# Patient Record
Sex: FEMALE | Race: WHITE | NOT HISPANIC OR LATINO | URBAN - METROPOLITAN AREA
[De-identification: names, ages, dates, MRNs, and addresses within clinical notes are randomized per-mention and may not be internally consistent; named-entity substitution may affect disease eponyms.]

---

## 2021-10-09 ENCOUNTER — INPATIENT (INPATIENT)
Facility: HOSPITAL | Age: 86
LOS: 11 days | Discharge: HOME | DRG: 871 | End: 2021-10-21
Attending: STUDENT IN AN ORGANIZED HEALTH CARE EDUCATION/TRAINING PROGRAM | Admitting: HOSPITALIST
Payer: MEDICARE

## 2021-10-09 VITALS
RESPIRATION RATE: 22 BRPM | HEART RATE: 140 BPM | TEMPERATURE: 97 F | WEIGHT: 169.98 LBS | HEIGHT: 64 IN | OXYGEN SATURATION: 80 % | SYSTOLIC BLOOD PRESSURE: 105 MMHG | DIASTOLIC BLOOD PRESSURE: 65 MMHG

## 2021-10-09 LAB
ALBUMIN SERPL ELPH-MCNC: 3.4 G/DL — SIGNIFICANT CHANGE UP (ref 3.3–5)
ALBUMIN SERPL ELPH-MCNC: 3.6 G/DL — SIGNIFICANT CHANGE UP (ref 3.3–5)
ALP SERPL-CCNC: 41 U/L — SIGNIFICANT CHANGE UP (ref 40–120)
ALP SERPL-CCNC: 42 U/L — SIGNIFICANT CHANGE UP (ref 40–120)
ALT FLD-CCNC: 12 U/L — SIGNIFICANT CHANGE UP (ref 10–45)
ALT FLD-CCNC: SIGNIFICANT CHANGE UP U/L (ref 10–45)
ANION GAP SERPL CALC-SCNC: 16 MMOL/L — SIGNIFICANT CHANGE UP (ref 5–17)
ANION GAP SERPL CALC-SCNC: 17 MMOL/L — SIGNIFICANT CHANGE UP (ref 5–17)
APTT BLD: 30.8 SEC — SIGNIFICANT CHANGE UP (ref 27.5–35.5)
AST SERPL-CCNC: 22 U/L — SIGNIFICANT CHANGE UP (ref 10–40)
AST SERPL-CCNC: SIGNIFICANT CHANGE UP U/L (ref 10–40)
BASOPHILS # BLD AUTO: 0.01 K/UL — SIGNIFICANT CHANGE UP (ref 0–0.2)
BASOPHILS NFR BLD AUTO: 0.1 % — SIGNIFICANT CHANGE UP (ref 0–2)
BILIRUB SERPL-MCNC: 0.2 MG/DL — SIGNIFICANT CHANGE UP (ref 0.2–1.2)
BILIRUB SERPL-MCNC: 0.2 MG/DL — SIGNIFICANT CHANGE UP (ref 0.2–1.2)
BUN SERPL-MCNC: 51 MG/DL — HIGH (ref 7–23)
BUN SERPL-MCNC: 52 MG/DL — HIGH (ref 7–23)
CALCIUM SERPL-MCNC: 8.2 MG/DL — LOW (ref 8.4–10.5)
CALCIUM SERPL-MCNC: 8.3 MG/DL — LOW (ref 8.4–10.5)
CHLORIDE SERPL-SCNC: 98 MMOL/L — SIGNIFICANT CHANGE UP (ref 96–108)
CHLORIDE SERPL-SCNC: 98 MMOL/L — SIGNIFICANT CHANGE UP (ref 96–108)
CO2 SERPL-SCNC: 18 MMOL/L — LOW (ref 22–31)
CO2 SERPL-SCNC: 20 MMOL/L — LOW (ref 22–31)
CREAT SERPL-MCNC: 4.58 MG/DL — HIGH (ref 0.5–1.3)
CREAT SERPL-MCNC: 4.63 MG/DL — HIGH (ref 0.5–1.3)
CRP SERPL-MCNC: 117.9 MG/L — HIGH (ref 0–4)
D DIMER BLD IA.RAPID-MCNC: 345 NG/ML DDU — HIGH
EOSINOPHIL # BLD AUTO: 0 K/UL — SIGNIFICANT CHANGE UP (ref 0–0.5)
EOSINOPHIL NFR BLD AUTO: 0 % — SIGNIFICANT CHANGE UP (ref 0–6)
FERRITIN SERPL-MCNC: 283 NG/ML — HIGH (ref 15–150)
GAS PNL BLDV: SIGNIFICANT CHANGE UP
GLUCOSE SERPL-MCNC: 194 MG/DL — HIGH (ref 70–99)
GLUCOSE SERPL-MCNC: 196 MG/DL — HIGH (ref 70–99)
HCT VFR BLD CALC: 42.3 % — SIGNIFICANT CHANGE UP (ref 34.5–45)
HGB BLD-MCNC: 14.1 G/DL — SIGNIFICANT CHANGE UP (ref 11.5–15.5)
IMM GRANULOCYTES NFR BLD AUTO: 0.6 % — SIGNIFICANT CHANGE UP (ref 0–1.5)
INR BLD: 1.03 — SIGNIFICANT CHANGE UP (ref 0.88–1.16)
LACTATE SERPL-SCNC: 1.8 MMOL/L — SIGNIFICANT CHANGE UP (ref 0.5–2)
LYMPHOCYTES # BLD AUTO: 1.26 K/UL — SIGNIFICANT CHANGE UP (ref 1–3.3)
LYMPHOCYTES # BLD AUTO: 11.7 % — LOW (ref 13–44)
MCHC RBC-ENTMCNC: 29.9 PG — SIGNIFICANT CHANGE UP (ref 27–34)
MCHC RBC-ENTMCNC: 33.3 GM/DL — SIGNIFICANT CHANGE UP (ref 32–36)
MCV RBC AUTO: 89.8 FL — SIGNIFICANT CHANGE UP (ref 80–100)
MONOCYTES # BLD AUTO: 0.89 K/UL — SIGNIFICANT CHANGE UP (ref 0–0.9)
MONOCYTES NFR BLD AUTO: 8.3 % — SIGNIFICANT CHANGE UP (ref 2–14)
NEUTROPHILS # BLD AUTO: 8.54 K/UL — HIGH (ref 1.8–7.4)
NEUTROPHILS NFR BLD AUTO: 79.3 % — HIGH (ref 43–77)
NRBC # BLD: 0 /100 WBCS — SIGNIFICANT CHANGE UP (ref 0–0)
NT-PROBNP SERPL-SCNC: 502 PG/ML — HIGH (ref 0–300)
PLATELET # BLD AUTO: 210 K/UL — SIGNIFICANT CHANGE UP (ref 150–400)
POTASSIUM SERPL-MCNC: 4.2 MMOL/L — SIGNIFICANT CHANGE UP (ref 3.5–5.3)
POTASSIUM SERPL-MCNC: SIGNIFICANT CHANGE UP MMOL/L (ref 3.5–5.3)
POTASSIUM SERPL-SCNC: 4.2 MMOL/L — SIGNIFICANT CHANGE UP (ref 3.5–5.3)
POTASSIUM SERPL-SCNC: SIGNIFICANT CHANGE UP MMOL/L (ref 3.5–5.3)
PROCALCITONIN SERPL-MCNC: 1.32 NG/ML — HIGH (ref 0.02–0.1)
PROT SERPL-MCNC: 7.2 G/DL — SIGNIFICANT CHANGE UP (ref 6–8.3)
PROT SERPL-MCNC: 7.8 G/DL — SIGNIFICANT CHANGE UP (ref 6–8.3)
PROTHROM AB SERPL-ACNC: 12.3 SEC — SIGNIFICANT CHANGE UP (ref 10.6–13.6)
RAPID RVP RESULT: DETECTED
RBC # BLD: 4.71 M/UL — SIGNIFICANT CHANGE UP (ref 3.8–5.2)
RBC # FLD: 13.7 % — SIGNIFICANT CHANGE UP (ref 10.3–14.5)
SARS-COV-2 RNA SPEC QL NAA+PROBE: DETECTED
SODIUM SERPL-SCNC: 133 MMOL/L — LOW (ref 135–145)
SODIUM SERPL-SCNC: 134 MMOL/L — LOW (ref 135–145)
TROPONIN T SERPL-MCNC: 0.02 NG/ML — HIGH (ref 0–0.01)
WBC # BLD: 10.77 K/UL — HIGH (ref 3.8–10.5)
WBC # FLD AUTO: 10.77 K/UL — HIGH (ref 3.8–10.5)

## 2021-10-09 PROCEDURE — 99291 CRITICAL CARE FIRST HOUR: CPT

## 2021-10-09 PROCEDURE — 99291 CRITICAL CARE FIRST HOUR: CPT | Mod: CS

## 2021-10-09 PROCEDURE — 93010 ELECTROCARDIOGRAM REPORT: CPT

## 2021-10-09 PROCEDURE — 71045 X-RAY EXAM CHEST 1 VIEW: CPT | Mod: 26

## 2021-10-09 RX ORDER — IPRATROPIUM/ALBUTEROL SULFATE 18-103MCG
3 AEROSOL WITH ADAPTER (GRAM) INHALATION ONCE
Refills: 0 | Status: COMPLETED | OUTPATIENT
Start: 2021-10-09 | End: 2021-10-09

## 2021-10-09 RX ORDER — SODIUM CHLORIDE 9 MG/ML
1000 INJECTION INTRAMUSCULAR; INTRAVENOUS; SUBCUTANEOUS ONCE
Refills: 0 | Status: COMPLETED | OUTPATIENT
Start: 2021-10-09 | End: 2021-10-09

## 2021-10-09 RX ORDER — ACETAMINOPHEN 500 MG
650 TABLET ORAL EVERY 6 HOURS
Refills: 0 | Status: DISCONTINUED | OUTPATIENT
Start: 2021-10-09 | End: 2021-10-21

## 2021-10-09 RX ORDER — DEXAMETHASONE 0.5 MG/5ML
6 ELIXIR ORAL ONCE
Refills: 0 | Status: COMPLETED | OUTPATIENT
Start: 2021-10-09 | End: 2021-10-09

## 2021-10-09 RX ORDER — REMDESIVIR 5 MG/ML
100 INJECTION INTRAVENOUS EVERY 24 HOURS
Refills: 0 | Status: DISCONTINUED | OUTPATIENT
Start: 2021-10-09 | End: 2021-10-09

## 2021-10-09 RX ORDER — ENOXAPARIN SODIUM 100 MG/ML
30 INJECTION SUBCUTANEOUS ONCE
Refills: 0 | Status: COMPLETED | OUTPATIENT
Start: 2021-10-09 | End: 2021-10-09

## 2021-10-09 RX ORDER — DEXAMETHASONE 0.5 MG/5ML
6 ELIXIR ORAL EVERY 12 HOURS
Refills: 0 | Status: DISCONTINUED | OUTPATIENT
Start: 2021-10-10 | End: 2021-10-11

## 2021-10-09 RX ADMIN — Medication 6 MILLIGRAM(S): at 17:39

## 2021-10-09 RX ADMIN — SODIUM CHLORIDE 1000 MILLILITER(S): 9 INJECTION INTRAMUSCULAR; INTRAVENOUS; SUBCUTANEOUS at 17:55

## 2021-10-09 RX ADMIN — Medication 3 MILLILITER(S): at 17:03

## 2021-10-09 RX ADMIN — Medication 3 MILLILITER(S): at 17:32

## 2021-10-09 RX ADMIN — ENOXAPARIN SODIUM 30 MILLIGRAM(S): 100 INJECTION SUBCUTANEOUS at 23:26

## 2021-10-09 NOTE — ED PROVIDER NOTE - OBJECTIVE STATEMENT
history of dm, htn, here with + covid test outpatient and generalized weakness/fatigue/sob/cough. Reports symptoms started after seeing son about a week ago who also tested positive. Denies pain, fever, chills. Saw her pmd Dr. Knight [phone] who reportedly told her to take mucinex and prescribed erythromycin for symptoms 3 days ago. Notified today that covid test came back positive, told to come to ED. Has been vaccinated with [employer] in February. Non smoker. history of dm, htn, here with + covid test outpatient and generalized weakness/fatigue/sob/cough. Reports symptoms started after seeing son about a week ago who also tested positive. Denies pain, fever, chills. Saw her pmd Dr. Knight [phone] who reportedly told her to take mucinex and prescribed erythromycin for symptoms 3 days ago. Notified today that covid test came back positive, told to come to ED. Has been vaccinated with [employer] in February. Non smoker.  Additional history above provided by daughter Ela Belle [phone]

## 2021-10-09 NOTE — ED PROVIDER NOTE - CARDIAC, MLM
Detail Level: Detailed Detail Level: Zone Normal rate, regular rhythm.  Heart sounds S1, S2.  No murmurs, rubs or gallops.

## 2021-10-09 NOTE — ED PROVIDER NOTE - CLINICAL SUMMARY MEDICAL DECISION MAKING FREE TEXT BOX
patient with reported + covid test outpatient, vaccinated, sick x 1 week with sob/ fatigue.  noted to be hypoxic on room air to 80%.  improved with supplemental oxygen to 91% on 6L.  confirmatory covid testing sent along with labs to eval other viral infections, acs, chf, pneumonia, inflammatory markers, sepsis.  cxr done showing bilateral patchy infiltrate.  started decadron.  tylenol prn for fever.  labs notable for elevated bun/ creatinine, unclear baseline. ivf bolus given as suspect component of dehydration. anticipate remdisivir by inpatient team. due to oxygen requirement, will admit for further management of acute hypoxic respiratory failure.

## 2021-10-09 NOTE — H&P ADULT - NSHPLABSRESULTS_GEN_ALL_CORE
14.1   10.77 )-----------( 210      ( 09 Oct 2021 16:52 )             42.3       10-09    134<L>  |  98  |  52<H>  ----------------------------<  194<H>  4.2   |  20<L>  |  4.63<H>    Ca    8.2<L>      09 Oct 2021 18:05    TPro  7.2  /  Alb  3.6  /  TBili  0.2  /  DBili  x   /  AST  22  /  ALT  12  /  AlkPhos  41  10-09                  PT/INR - ( 09 Oct 2021 18:05 )   PT: 12.3 sec;   INR: 1.03          PTT - ( 09 Oct 2021 18:05 )  PTT:30.8 sec    CARDIAC MARKERS ( 09 Oct 2021 16:52 )  x     / 0.02 ng/mL / x     / x     / x            CAPILLARY BLOOD GLUCOSE

## 2021-10-09 NOTE — H&P ADULT - ASSESSMENT
84 yo F with PMHx T2DM, HLD, HTN presented to [facility] ED on 10/9 with 1w Hx of generalized weakness, fatigue, SOB, and cough in the setting of positive COVID PCR as outpatient. Patient admitted to [facility] for tele monitoring i/s/o sepsis 2/2 COVID PNA.      NEURO  #    CARDIOVASCULAR  #HTN      #HLD      PULMONARY  #Sepsis 2/2 COVID PNA    ENDO  #T2DM    GI  #    ID  #    HEME/ONC  #      PROPHYLACTIC MEASURES  F:  E: Replete PRN  D:  DVT PPx:  GI PPx:  CODE:   Dispo: [facility]       86 yo F with PMHx T2DM, HLD, HTN presented to [facility] ED on 10/9 with 1w Hx of generalized weakness, fatigue, SOB, and cough in the setting of positive COVID PCR as outpatient. Patient admitted to [facility] for tele monitoring for AHRF i/s/o sepsis 2/2 COVID PNA.      NEURO  No active issues.     CARDIOVASCULAR  #HTN  Pt home meds include amlodipine 10 mg qd, metoprolol ER succinate 100mg qd and nifedipine 60mg ED qd.     #HLD  Pt home meds include simvastatin 20 mg qhs.    PULMONARY  #Acute hypoxemic respiratory failure i/s/o Sepsis 2/2 COVID PNA  - Place on HFNC to maintain spo2>90  - Remdesevir 200 mg On day 1 followed by 100 mg daily fro 4 days  - decadron 6 mg qd for 10 days      RENAL  #Acute kidney injury  BUN/Cr  51/4.58    ENDO  #T2DM  Pt home meds include glimepiride 2mg BID and metformin 1g qd.    GI  No active issues    ID  #Sepsis 2/2 COVID PNA    HEME/ONC  No active issues      PROPHYLACTIC MEASURES  F:  E: Replete PRN  D:  DVT PPx:  GI PPx:  CODE:   Dispo: [facility]       84 yo F with PMHx T2DM, HLD, HTN presented to [facility] ED on 10/9 with 1w Hx of generalized weakness, fatigue, SOB, and cough in the setting of positive COVID PCR as outpatient. Patient admitted to [facility] for tele monitoring for AHRF i/s/o sepsis 2/2 COVID PNA.      NEURO  No active issues.     CARDIOVASCULAR  #HTN  Pt home meds include amlodipine 10 mg qd, metoprolol ER succinate 100mg qd and nifedipine 60mg ED qd.   - Holding home meds for now    #HLD  Pt home meds include simvastatin 20 mg qhs.  - Holding home meds for now    #Troponemia  trop 0.02 on arrival, no past cardiac medical history except for HTN/HLD. EKG was NSR, 77 bpm, TWI in III.  -f/u trops AM 10/10  -Get collateral if pt has any cardiac medical history    PULMONARY  #Acute hypoxemic respiratory failure i/s/o Sepsis 2/2 COVID PNA  At ED, WBC and Hgb wnl, d-dimer 345, .9, ferritin 283, procal 1.32, lactate 1.8, trop 0.02, .   VBG: pH 7.27, pO2 41, pCO2 48, bicarb 22  CXR - No pneumothorax, bilateral patchy opacities, No free air. heart normal. bones normal.  Pt given duonebs x2, Dexamethasone 6mg IVP  - Place on HFNC to maintain spo2>90  - Remdesevir 200 mg On day 1 followed by 100 mg daily fro 4 days  - decadron 6 mg qd for 10 days      RENAL  #Acute kidney injury, possibly in the setting of decrease PO intake/dehydration vs COVID-associated JUDITH  BUN/Cr  51/4.58  - Send urine lytes and obtain renal sono  - Avoid nephrotoxic meds  - Trend BUN/Cr  - Get collateral from PMD regarding baseline    #Hyponatremia i/s/o decreased PO intake/dehydration  Na 133 on arrival  - c/w IV hydration with LR @ 75cc/hour  - f/u Na on 10/10 AM    ENDO  #T2DM  Pt home meds include glimepiride 2mg BID and metformin 1g qd.  - ISS  - Holding home meds for now    GI  No active issues    ID  #Sepsis 2/2 COVID PNA  Treatment as above (AHRF i/s/o sepsis 2/2 COVID PNA)    HEME/ONC  No active issues      PROPHYLACTIC MEASURES  F: IV hydration with NS @ 70cc/hour  E: Replete PRN  D: Regular diet  DVT PPx: Lovenox   GI PPx: protonix  CODE: FULL  Dispo: [facility]       86 yo F with PMHx T2DM, HLD, HTN presented to [facility] ED on 10/9 with 1w Hx of generalized weakness, fatigue, SOB, and cough in the setting of positive COVID PCR as outpatient. Patient admitted to [facility] for tele monitoring for AHRF i/s/o sepsis 2/2 COVID PNA.      NEURO  No active issues.     CARDIOVASCULAR  #HTN  Pt home meds include amlodipine 10 mg qd, metoprolol ER succinate 100mg qd and nifedipine 60mg ED qd.   - Holding home meds for now    #HLD  Pt home meds include simvastatin 20 mg qhs.  - Holding home meds for now    #Troponemia  trop 0.02 on arrival, no past cardiac medical history except for HTN/HLD. EKG was NSR, 77 bpm, TWI in III.  -f/u trops AM 10/10  -Get collateral if pt has any cardiac medical history    PULMONARY  #Acute hypoxemic respiratory failure i/s/o Sepsis 2/2 COVID PNA  At ED, WBC and Hgb wnl, d-dimer 345, .9, ferritin 283, procal 1.32, lactate 1.8, trop 0.02, .   VBG: pH 7.27, pO2 41, pCO2 48, bicarb 22  CXR - No pneumothorax, bilateral patchy opacities, No free air. heart normal. bones normal.  Pt given duonebs x2, Dexamethasone 6mg IVP  - Place on HFNC to maintain spo2>90  - Remdesevir 200 mg On day 1 followed by 100 mg daily fro 4 days  - decadron 6 mg qd for 10 days      RENAL  #Acute kidney injury, possibly in the setting of decrease PO intake/dehydration vs COVID-associated JUDITH  BUN/Cr  51/4.58  - Send urine lytes and obtain renal sono  - Avoid nephrotoxic meds  - Trend BUN/Cr  - Get collateral from PMD regarding baseline    #Hyponatremia i/s/o decreased PO intake/dehydration  Na 133 on arrival  - c/w IV hydration with LR @ 75cc/hour  - f/u Na on 10/10 AM    ENDO  #T2DM  Pt home meds include glimepiride 2mg BID and metformin 1g qd.  - ISS  - Holding home meds for now    GI  No active issues    ID  #Sepsis 2/2 COVID PNA  Treatment as above (AHRF i/s/o sepsis 2/2 COVID PNA)    HEME/ONC  No active issues      PROPHYLACTIC MEASURES  F: IV hydration with NS @ 70cc/hour  E: Replete PRN  D: DASH/TLC  DVT PPx: Lovenox   GI PPx: protonix  CODE: FULL  Dispo: [facility]       86 yo F with PMHx T2DM, HLD, HTN presented to [facility] ED on 10/9 with 1w Hx of generalized weakness, fatigue, SOB, and cough in the setting of positive COVID PCR as outpatient. Patient admitted to [facility] for tele monitoring for AHRF i/s/o sepsis 2/2 COVID PNA.      NEURO  No active issues.     CARDIOVASCULAR  #HTN  Pt home meds include amlodipine 10 mg qd, metoprolol ER succinate 100mg qd and nifedipine 60mg ED qd.   - Holding home meds for now    #HLD  Pt home meds include simvastatin 20 mg qhs.  - Holding home meds for now    #Troponemia  trop 0.02 on arrival, no past cardiac medical history except for HTN/HLD. EKG was NSR, 77 bpm, TWI in III.  -f/u trops AM 10/10  -Get collateral if pt has any cardiac medical history    PULMONARY  #Acute hypoxemic respiratory failure i/s/o Sepsis 2/2 COVID PNA  At ED, WBC and Hgb wnl, d-dimer 345, .9, ferritin 283, procal 1.32, lactate 1.8, trop 0.02, .   VBG: pH 7.27, pO2 41, pCO2 48, bicarb 22  CXR - No pneumothorax, bilateral patchy opacities, No free air. heart normal. bones normal.  Pt given duonebs x2, Dexamethasone 6mg IVP  - Place on HFNC to maintain spo2>90  - Remdesevir 200 mg On day 1 followed by 100 mg daily from day 4  - decadron 6 mg qd for 10 days      RENAL  #Acute kidney injury, possibly in the setting of decrease PO intake/dehydration vs COVID-associated JUDITH  BUN/Cr  51/4.58  - Send urine lytes and obtain renal sono  - Avoid nephrotoxic meds  - Trend BUN/Cr  - Get collateral from PMD regarding baseline    #Hyponatremia i/s/o decreased PO intake/dehydration  Na 133 on arrival  - c/w IV hydration with LR @ 75cc/hour  - f/u Na on 10/10 AM    ENDO  #T2DM  Pt home meds include glimepiride 2mg BID and metformin 1g qd.  - ISS  - Holding home meds for now    GI  No active issues    ID  #Sepsis 2/2 COVID PNA  Treatment as above (AHRF i/s/o sepsis 2/2 COVID PNA)    HEME/ONC  No active issues      PROPHYLACTIC MEASURES  F: IV hydration with NS @ 70cc/hour  E: Replete PRN  D: DASH/TLC  DVT PPx: Lovenox   GI PPx: protonix  CODE: FULL  Dispo: [facility]       86 yo F with PMHx T2DM, HLD, HTN presented to [facility] ED on 10/9 with 1w Hx of generalized weakness, fatigue, SOB, and cough in the setting of positive COVID PCR as outpatient. Patient admitted to [facility] for tele monitoring for AHRF i/s/o sepsis 2/2 COVID PNA.      NEURO  No active issues.     CARDIOVASCULAR  #HTN  Pt home meds include amlodipine 10 mg qd, metoprolol ER succinate 100mg qd and nifedipine 60mg ED qd.   - Holding home meds for now    #HLD  Pt home meds include simvastatin 20 mg qhs.  - Holding home meds for now    #Troponemia  trop 0.02 on arrival, no past cardiac medical history except for HTN/HLD. EKG was NSR, 77 bpm, TWI in III.  -f/u trops AM 10/10  -Get collateral if pt has any cardiac medical history    PULMONARY  #Acute hypoxemic respiratory failure i/s/o Sepsis 2/2 COVID PNA  At ED, WBC and Hgb wnl, d-dimer 345, .9, ferritin 283, procal 1.32, lactate 1.8, trop 0.02, .   VBG: pH 7.27, pO2 41, pCO2 48, bicarb 22  CXR - No pneumothorax, bilateral patchy opacities, No free air. heart normal. bones normal.  Pt given duonebs x2, Dexamethasone 6mg IVP  - Place on HFNC to maintain spo2>90  - Remdesevir 200 mg On day 1 followed by 100 mg daily from day 4  - decadron 6 mg qd for 10 days      RENAL  #Acute kidney injury, possibly in the setting of decrease PO intake/dehydration vs COVID-associated JUDITH  BUN/Cr  51/4.58  - Send urine lytes and obtain renal sono  - Avoid nephrotoxic meds  - Trend BUN/Cr  - Get collateral from PMD regarding baseline    #Hyponatremia i/s/o decreased PO intake/dehydration  Na 133 on arrival  - c/w IV hydration with LR @ 75cc/hour  - f/u Na on 10/10 AM    ENDO  #T2DM  Pt home meds include glimepiride 2mg BID and metformin 1g qd.  - ISS  - Holding home meds for now    GI  No active issues    ID  #Sepsis 2/2 COVID PNA  Treatment as above (AHRF i/s/o sepsis 2/2 COVID PNA)    HEME/ONC  No active issues      PROPHYLACTIC MEASURES  F: IV hydration with NS @ 75cc/hour  E: Replete PRN  D: DASH/TLC  DVT PPx: Lovenox   GI PPx: protonix  CODE: FULL  Dispo: [facility]       84 yo F with PMHx T2DM, HLD, HTN presented to [facility] ED on 10/9 with 1w Hx of generalized weakness, fatigue, SOB, and cough in the setting of positive COVID PCR as outpatient. Patient admitted to [facility] for tele monitoring for AHRF i/s/o sepsis 2/2 COVID PNA.      NEURO  No active issues.     CARDIOVASCULAR  #HTN  Pt home meds include amlodipine 10 mg qd, metoprolol ER succinate 100mg qd and nifedipine 60mg ED qd.   - Holding home meds for now    #HLD  Pt home meds include simvastatin 20 mg qhs.  - Holding home meds for now    #Troponemia  trop 0.02 on arrival, no past cardiac medical history except for HTN/HLD. EKG was NSR, 77 bpm, TWI in III.  -f/u trops AM 10/10  -Get collateral if pt has any cardiac medical history    PULMONARY  #Acute hypoxemic respiratory failure i/s/o Sepsis 2/2 COVID PNA  At ED, WBC and Hgb wnl, d-dimer 345, .9, ferritin 283, procal 1.32, lactate 1.8, trop 0.02, .   VBG: pH 7.27, pO2 41, pCO2 48, bicarb 22  CXR - No pneumothorax, bilateral patchy opacities, No free air. heart normal. bones normal.  Pt given duonebs x2, Dexamethasone 6mg IVP  - Place on HFNC to maintain spo2>90  - Initially planned to treat pt with remdesevir, but pt with GFR 8 (can't use Remdesevir if GFR <30 per pharm)  - Decadron 6 mg qd for 10 days      RENAL  #Acute kidney injury, possibly in the setting of decrease PO intake/dehydration vs COVID-associated JUDITH  BUN/Cr  51/4.58  - Send urine lytes and obtain renal sono  - Avoid nephrotoxic meds  - Trend BUN/Cr  - Get collateral from PMD regarding baseline    #Hyponatremia i/s/o decreased PO intake/dehydration  Na 133 on arrival  - c/w IV hydration with LR @ 75cc/hour  - f/u Na on 10/10 AM    ENDO  #T2DM  Pt home meds include glimepiride 2mg BID and metformin 1g qd.  - ISS  - Holding home meds for now    GI  No active issues    ID  #Sepsis 2/2 COVID PNA  Treatment as above (AHRF i/s/o sepsis 2/2 COVID PNA)    HEME/ONC  No active issues      PROPHYLACTIC MEASURES  F: IV hydration with NS @ 75cc/hour  E: Replete PRN  D: DASH/TLC  DVT PPx: Lovenox   GI PPx: protonix  CODE: FULL  Dispo: [facility]

## 2021-10-09 NOTE — ED ADULT NURSE REASSESSMENT NOTE - NS ED NURSE REASSESS COMMENT FT1
Patient a/oX3, sttes feeling better s/p neb TX, Dexamethasone 6mg IVP, NSS bolus ongoing.  NSR on cardiac monitor, vital signs stable.  Primafit in place for comfort.  Results and disposition pending.  Isolation protocol observed.

## 2021-10-09 NOTE — ED ADULT TRIAGE NOTE - TEMPERATURE IN FAHRENHEIT (DEGREES F)
Problem: Patient Care Overview  Goal: Plan of Care Review  Outcome: Ongoing (interventions implemented as appropriate)  POC reviewed with pt, understanding verbalized. AAOx4. VSS:afebrile. Spouse at bedside. Pt tolerating clear liquid diet. No complaints of N/V. Pain controlled with pca pump and po meds once for breakthrough. ANDRÉS's and SCD's on over night. Sharma intact, urine output adequate. Accordian drains x 2 with one at suction. Bed in lowest position. Call bell within reach. WCTM.        97.3

## 2021-10-09 NOTE — H&P ADULT - NSHPSOCIALHISTORY_GEN_ALL_CORE
Pt lives alone, does not smoke, denies alcohol or illicit drugs use. Pt received Pfizer doses in Jan/Feb. Pt received flu shot in Sep.

## 2021-10-09 NOTE — CONSULT NOTE ADULT - SUBJECTIVE AND OBJECTIVE BOX
Patient is a 85y old  Female who presents with a chief complaint of COVID PNA (09 Oct 2021 20:13)          HPI:  84 YO F with PMHx T2DM, HTN presented to [facility] ED on 10/9 with 1w Hx of generalized weakness, fatigue, SOB, and cough in the setting of positive COVID PCR as outpatient. Patient reports no recent travel however has been in contact with her son who recently tested COVID positive 1 week prior. Pt saw her PMD (Dr. Knight [phone]) who reportedly told her to take mucinex and prescribed erythromycin for symptoms 3d ago. Pt was notified today that COVID test came back positive, told to come to ED.   Of note, patient received both doses of the Pfizer COVID vaccine in Feb/March. Pt reports loss of taste and smell. Also, pt currently reports continued dyspnea on exertion, weakness and fatigue. Pt denies F/C, cough, CP, palpitations, abd pain, N/V/D, HA, lightheadedness/dizziness. Additional history above provided by daughter Ela Belle [phone].    ED COURSE  VS: T 97.3F (oral), , /65, RR 22, SpO2 80% (RA) --> 92% (6L NC)  Labs: WBC 10.77, Hgb 14.1, d-dimer 345, coags wnl, Na 133, bicarb 18, BUN/Cr 51/4.58, Glc 196, Ca 8.3, .9, ferritin 283, procal 1.32, lactate 1.8, trop 0.02, .   VBG: pH 7.27,   Urine:  EKG: NSR, 77 bpm, TWI in III  CXR - No pneumothorax, bilateral patchy opacities, No free air. heart normal. bones normal.  Interventions: Duonebs x2, Dexamethasone 6mg IVP, 1L NS bolus    Patient is being admitted to [facility] for further management of sepsis 2/2 COVID PNA.  (09 Oct 2021 20:13)      Allergies    Allergy Status Unknown    Intolerances      Home Medications:      SOCIAL HX:     Smoking          ETOH/Illicit drugs          Occupation    PAST MEDICAL & SURGICAL HISTORY:  Diabetes    HTN (hypertension)    No significant past surgical history        FAMILY HISTORY:  :    No known cardiovascular or pulmonary family history     ROS:  See HPI     PHYSICAL EXAM    ICU Vital Signs Last 24 Hrs  T(C): 37 (09 Oct 2021 21:26), Max: 37.1 (09 Oct 2021 18:33)  T(F): 98.6 (09 Oct 2021 21:26), Max: 98.8 (09 Oct 2021 18:33)  HR: 86 (09 Oct 2021 21:26) (78 - 140)  BP: 114/57 (09 Oct 2021 21:26) (105/65 - 115/69)  BP(mean): --  ABP: --  ABP(mean): --  RR: 20 (09 Oct 2021 21:26) (20 - 22)  SpO2: 94% (09 Oct 2021 21:26) (80% - 94%)      General: Not in distress  Lungs: B/L crackles, no wheezing   Cardiovascular: RRR no m/r/g    Gastrointestinal: Soft, Positive BS  Musculoskeletal: No clubbing.  Moves all extremities.    Skin: Warm.  Intact  Neurological: No motor or sensory deficit         LABS:                          14.1   10.77 )-----------( 210      ( 09 Oct 2021 16:52 )             42.3                                               10-09    134<L>  |  98  |  52<H>  ----------------------------<  194<H>  4.2   |  20<L>  |  4.63<H>    Ca    8.2<L>      09 Oct 2021 18:05    TPro  7.2  /  Alb  3.6  /  TBili  0.2  /  DBili  x   /  AST  22  /  ALT  12  /  AlkPhos  41  10-09      PT/INR - ( 09 Oct 2021 18:05 )   PT: 12.3 sec;   INR: 1.03          PTT - ( 09 Oct 2021 18:05 )  PTT:30.8 sec                                           CARDIAC MARKERS ( 09 Oct 2021 16:52 )  x     / 0.02 ng/mL / x     / x     / x                                                LIVER FUNCTIONS - ( 09 Oct 2021 18:05 )  Alb: 3.6 g/dL / Pro: 7.2 g/dL / ALK PHOS: 41 U/L / ALT: 12 U/L / AST: 22 U/L / GGT: x                                                                                                                                           CXR:    ECHO:    MEDICATIONS  (STANDING):  enoxaparin Injectable 30 milliGRAM(s) SubCutaneous once    MEDICATIONS  (PRN):  acetaminophen   Tablet .. 650 milliGRAM(s) Oral every 6 hours PRN Temp greater or equal to 38C (100.4F), Mild Pain (1 - 3)         Patient is a 85y old  Female who presents with a chief complaint of COVID PNA (09 Oct 2021 20:13)    Consult reason: AHRF 2/2 covid       HPI:  84 YO F with PMHx T2DM, HTN presented to [facility] ED on 10/9 with 1w Hx of generalized weakness, fatigue, SOB, and cough in the setting of positive COVID PCR as outpatient. Patient reports no recent travel however has been in contact with her son who recently tested COVID positive 1 week prior. Pt saw her PMD (Dr. Knight [phone]) who reportedly told her to take mucinex and prescribed erythromycin for symptoms 3d ago. Pt was notified today that COVID test came back positive, told to come to ED.   Of note, patient received both doses of the Pfizer COVID vaccine in Feb/March. Pt reports loss of taste and smell. Also, pt currently reports continued dyspnea on exertion, weakness and fatigue. Pt denies F/C, cough, CP, palpitations, abd pain, N/V/D, HA, lightheadedness/dizziness. Additional history above provided by daughter Ela Belle [phone].    ED COURSE  VS: T 97.3F (oral), , /65, RR 22, SpO2 80% (RA) --> 92% (6L NC)  Labs: WBC 10.77, Hgb 14.1, d-dimer 345, coags wnl, Na 133, bicarb 18, BUN/Cr 51/4.58, Glc 196, Ca 8.3, .9, ferritin 283, procal 1.32, lactate 1.8, trop 0.02, .   VBG: pH 7.27,   Urine:  EKG: NSR, 77 bpm, TWI in III  CXR - No pneumothorax, bilateral patchy opacities, No free air. heart normal. bones normal.  Interventions: Duonebs x2, Dexamethasone 6mg IVP, 1L NS bolus    Patient is being admitted to [facility] for further management of sepsis 2/2 COVID PNA.  (09 Oct 2021 20:13)      Allergies    Allergy Status Unknown    Intolerances      Home Medications:      SOCIAL HX:     Smoking          ETOH/Illicit drugs          Occupation    PAST MEDICAL & SURGICAL HISTORY:  Diabetes    HTN (hypertension)    No significant past surgical history        FAMILY HISTORY:  :    No known cardiovascular or pulmonary family history     ROS:  See HPI     PHYSICAL EXAM    ICU Vital Signs Last 24 Hrs  T(C): 37 (09 Oct 2021 21:26), Max: 37.1 (09 Oct 2021 18:33)  T(F): 98.6 (09 Oct 2021 21:26), Max: 98.8 (09 Oct 2021 18:33)  HR: 86 (09 Oct 2021 21:26) (78 - 140)  BP: 114/57 (09 Oct 2021 21:26) (105/65 - 115/69)  BP(mean): --  ABP: --  ABP(mean): --  RR: 20 (09 Oct 2021 21:26) (20 - 22)  SpO2: 94% (09 Oct 2021 21:26) (80% - 94%)      General: Not in distress. able to talk in full sentences.   Neck: No JVD   Lungs: B/L crackles, no wheezing   Cardiovascular: RRR no m/r/g    Gastrointestinal: Soft, Positive BS  Musculoskeletal: No clubbing.  Moves all extremities.  No peripheral edema  Skin: Warm.  Intact  Neurological: No motor or sensory deficit         LABS:                          14.1   10.77 )-----------( 210      ( 09 Oct 2021 16:52 )             42.3                                               10-09    134<L>  |  98  |  52<H>  ----------------------------<  194<H>  4.2   |  20<L>  |  4.63<H>    Ca    8.2<L>      09 Oct 2021 18:05    TPro  7.2  /  Alb  3.6  /  TBili  0.2  /  DBili  x   /  AST  22  /  ALT  12  /  AlkPhos  41  10-09      PT/INR - ( 09 Oct 2021 18:05 )   PT: 12.3 sec;   INR: 1.03          PTT - ( 09 Oct 2021 18:05 )  PTT:30.8 sec                                           CARDIAC MARKERS ( 09 Oct 2021 16:52 )  x     / 0.02 ng/mL / x     / x     / x                                                LIVER FUNCTIONS - ( 09 Oct 2021 18:05 )  Alb: 3.6 g/dL / Pro: 7.2 g/dL / ALK PHOS: 41 U/L / ALT: 12 U/L / AST: 22 U/L / GGT: x                                                                                                                                           CXR:    ECHO:    MEDICATIONS  (STANDING):  enoxaparin Injectable 30 milliGRAM(s) SubCutaneous once    MEDICATIONS  (PRN):  acetaminophen   Tablet .. 650 milliGRAM(s) Oral every 6 hours PRN Temp greater or equal to 38C (100.4F), Mild Pain (1 - 3)

## 2021-10-09 NOTE — ED PROVIDER NOTE - CARE PLAN
1 Principal Discharge DX:	Acute hypoxemic respiratory failure due to COVID-19  Secondary Diagnosis:	Acute hypoxemic respiratory failure due to COVID-19  Secondary Diagnosis:	JUDITH (acute kidney injury)

## 2021-10-09 NOTE — ED ADULT NURSE REASSESSMENT NOTE - NS ED NURSE REASSESS COMMENT FT1
Patient a/oX 3, anxious, c/o of SOB, weakness and fatigue, no chest pain or SOB, no fever or chills, denies cough, states has loss of taste and smell.  Wheezing noted on bilateral lung fields.  NSR on EKG and cardiac monitor, O2 sat improved 92% on O2 6 L NC, other vitals stable.  Right hand PIV #18 in place, all labs, blood cultures sent.  Xray done.  Duoneb neb TX ongoing.  Will continue to monitor.

## 2021-10-09 NOTE — CONSULT NOTE ADULT - ATTENDING COMMENTS
84 YO F with PMHx T2DM, HTN presented to [facility] ED on 10/9 with 1w Hx of generalized weakness, fatigue, SOB, and cough in the setting of positive COVID PCR as outpatient.  Patient is being admitted to step down for acute hypoxemic resp failure 2.2 covid pna.      # Acute hypoxemic res failure  # covid pna  # acute renal failure  # diabetes    Admit to step down  - Place on HFNC to maintain spo2>90  - Hold home BP meds  - Remdesevir 200 mg On day 1 followed by 100 mg daily fro 4 days  - decadron 6 mg qd for 10 days  - Hold home BP meds and diabetes meds  - Send urine lytes and obtain renal sono  - IV hydration with LR @ 75cc/hour  - ISS  - heparin for dvt ppx

## 2021-10-09 NOTE — H&P ADULT - HISTORY OF PRESENT ILLNESS
86 YO F with PMHx T2DM, HTN px to [facility] ED on 10/9 with hx of generalized weakness, fatigue, SOB, and cough x1wk in the setting of positive COVID PCR as outpatient. Patient reports no recent travel however has been in contact with her son who recently tested COVID positive 1 week prior. Of note, patient received both doses of the Pfizer COVID vaccine.     ED COURSE  VS: T 97.3F (oral), , /65, RR 22, SpO2 80% (RA) --> 92% (6L NC)  Labs:  Urine:  EKG:  CXR:  Orders:   Consults:    Patient is being admitted to [facility] for further management of sepsis 2/2 COVID PNA.  84 YO F with PMHx T2DM, HTN px to [facility] ED on 10/9 with hx of generalized weakness, fatigue, SOB, and cough x1wk in the setting of positive COVID PCR as outpatient. Patient reports no recent travel however has been in contact with her son who recently tested COVID positive 1 week prior. Of note, patient received both doses of the Pfizer COVID vaccine.     Patient     ED COURSE  VS: T 97.3F (oral), , /65, RR 22, SpO2 80% (RA) --> 92% (6L NC)  Labs:  Urine:  EKG:  CXR:  Orders:   Consults:    Patient is being admitted to [facility] for further management of sepsis 2/2 COVID PNA.  84 YO F with PMHx T2DM, HTN presented to [facility] ED on 10/9 with 1w Hx of generalized weakness, fatigue, SOB, and cough in the setting of positive COVID PCR as outpatient. Patient reports no recent travel however has been in contact with her son who recently tested COVID positive 1 week prior. Pt saw her PMD (Dr. Knight [phone]) who reportedly told her to take mucinex and prescribed erythromycin for symptoms 3d ago. Pt was notified today that COVID test came back positive, told to come to ED.   Of note, patient received both doses of the Pfizer COVID vaccine in Feb/March. Pt reports loss of taste and smell. Also, pt currently reports continued dyspnea on exertion, weakness and fatigue. Pt denies F/C, cough, CP, palpitations, abd pain, N/V/D, HA, lightheadedness/dizziness. Additional history above provided by daughter Ela Belle [phone].    ED COURSE  VS: T 97.3F (oral), , /65, RR 22, SpO2 80% (RA) --> 92% (6L NC)  Labs: WBC 10.77, Hgb 14.1, d-dimer 345, coags wnl, Na 133, bicarb 18, BUN/Cr 51/4.58, Glc 196, Ca 8.3, .9, ferritin 283, procal 1.32, lactate 1.8, trop 0.02, .   VBG: pH 7.27,   Urine:  EKG: NSR, 77 bpm, TWI in III  CXR - No pneumothorax, bilateral patchy opacities, No free air. heart normal. bones normal.  Interventions: Duonebs x2, Dexamethasone 6mg IVP, 1L NS bolus    Patient is being admitted to [facility] for further management of sepsis 2/2 COVID PNA.  86 YO F with PMHx T2DM, HLD, HTN presented to [facility] ED on 10/9 with 1w Hx of generalized weakness, fatigue, SOB, and cough in the setting of positive COVID PCR as outpatient. Patient reports no recent travel however has been in contact with her son who recently tested COVID positive 1 week prior. Pt saw her PMD (Dr. Knight [phone]) who reportedly told her to take mucinex and prescribed erythromycin for symptoms 3d ago. Pt was notified today that COVID test came back positive, told to come to ED.   Of note, patient received both doses of the Pfizer COVID vaccine in Feb/March. Pt reports loss of taste and smell. Also, pt currently reports continued dyspnea on exertion, weakness and fatigue. Pt denies F/C, cough, CP, palpitations, abd pain, N/V/D, HA, lightheadedness/dizziness. Additional history above provided by daughter Ela Belle [phone].    ED COURSE  VS: T 97.3F (oral), , /65, RR 22, SpO2 80% (RA) --> 92% (6L NC)  Labs: WBC 10.77, Hgb 14.1, d-dimer 345, coags wnl, Na 133, bicarb 18, BUN/Cr 51/4.58, Glc 196, Ca 8.3, .9, ferritin 283, procal 1.32, lactate 1.8, trop 0.02, .   VBG: pH 7.27, pO2 41, pCO2 48, bicarb 22  EKG: NSR, 77 bpm, TWI in III  CXR - No pneumothorax, bilateral patchy opacities, No free air. heart normal. bones normal.  Interventions: Duonebs x2, Dexamethasone 6mg IVP, 1L NS bolus    Patient was seen by critical team and subsequently admitted to [facility] for further management of sepsis 2/2 COVID PNA.  86 YO F with PMHx T2DM, HLD, HTN presented to [facility] ED on 10/9 with 1w Hx of generalized weakness, fatigue, SOB, and cough in the setting of positive COVID PCR as outpatient. Patient reports no recent travel however has been in contact with her son who recently tested COVID positive 1 week prior. Pt saw her PMD (Dr. Knight [phone]) who reportedly told her to take mucinex and prescribed erythromycin for symptoms 3d ago. Pt was notified today that COVID test came back positive, told to come to ED.   Of note, patient received both doses of the Pfizer COVID vaccine in Jan/Feb. Also, pt currently reports continued cough, weakness and fatigue. Pt denies F/C, SOB, CP, palpitations, abd pain, N/V/D, HA, lightheadedness/dizziness. Additional history above provided by daughter Ela Belle [phone] to ED team.     ED COURSE  VS: T 97.3F (oral), , /65, RR 22, SpO2 80% (RA) --> 92% (6L NC)  Labs: WBC 10.77, Hgb 14.1, d-dimer 345, coags wnl, Na 133, bicarb 18, BUN/Cr 51/4.58, Glc 196, Ca 8.3, .9, ferritin 283, procal 1.32, lactate 1.8, trop 0.02, .   VBG: pH 7.27, pO2 41, pCO2 48, bicarb 22  EKG: NSR, 77 bpm, TWI in III  CXR - No pneumothorax, bilateral patchy opacities, No free air. heart normal. bones normal.  Interventions: Duonebs x2, Dexamethasone 6mg IVP, 1L NS bolus    Patient was seen by critical team and subsequently admitted to [facility] for further management of sepsis 2/2 COVID PNA.  84 YO F with PMHx T2DM, HLD, HTN presented to [facility] ED on 10/9 with 1w Hx of generalized weakness, fatigue, SOB, decreased appetite, and cough in the setting of positive COVID PCR as outpatient. Patient reports no recent travel however has been in contact with her son who recently tested COVID positive 1 week prior. Pt saw her PMD (Dr. Knight [phone]) who reportedly told her to take mucinex and prescribed erythromycin for symptoms 3d ago. Pt was notified today that COVID test came back positive, told to come to ED.   Of note, patient received both doses of the Pfizer COVID vaccine in Jan/Feb. Pt received flu shot in September. Also, pt currently reports continued cough, decreased appetite, weakness and fatigue. Pt does not smoke. Pt denies F/C, SOB, CP, palpitations, abd pain, N/V/D, HA, lightheadedness/dizziness. Additional history above provided by daughter Ela Belle [phone] to ED team.     ED COURSE  VS: T 97.3F (oral), , /65, RR 22, SpO2 80% (RA) --> 92% (6L NC)  Labs: WBC 10.77, Hgb 14.1, d-dimer 345, coags wnl, Na 133, bicarb 18, BUN/Cr 51/4.58, Glc 196, Ca 8.3, .9, ferritin 283, procal 1.32, lactate 1.8, trop 0.02, .   VBG: pH 7.27, pO2 41, pCO2 48, bicarb 22  EKG: NSR, 77 bpm, TWI in III  CXR - No pneumothorax, bilateral patchy opacities, No free air. heart normal. bones normal.  Interventions: Duonebs x2, Dexamethasone 6mg IVP, 1L NS bolus    Patient was seen by critical team and subsequently admitted to [facility] for further management of sepsis 2/2 COVID PNA.

## 2021-10-09 NOTE — ED ADULT TRIAGE NOTE - CHIEF COMPLAINT QUOTE
Pt presents to the ED c/o SOB associated w/ generalized weakness and cough that began a few days ago. Pt tested positive for covid yesterday. Denies fever, chills, CP, palpitations, sore throat.

## 2021-10-09 NOTE — H&P ADULT - NSHPPHYSICALEXAM_GEN_ALL_CORE
VITAL SIGNS:  T(C): 37 (10-09-21 @ 21:26), Max: 37.1 (10-09-21 @ 18:33)  T(F): 98.6 (10-09-21 @ 21:26), Max: 98.8 (10-09-21 @ 18:33)  HR: 86 (10-09-21 @ 21:26) (78 - 140)  BP: 114/57 (10-09-21 @ 21:26) (105/65 - 115/69)  BP(mean): --  RR: 20 (10-09-21 @ 21:26) (20 - 22)  SpO2: 94% (10-09-21 @ 21:26) (80% - 94%)  Wt(kg): --    PHYSICAL EXAM:    Constitutional: Resting comfortably in bed; NAD  Head: NC/AT  Eyes: PERRL, EOMI, anicteric sclera  ENT: no nasal discharge; uvula midline, no oropharyngeal erythema or exudates; MMM  Neck: supple; no JVD or thyromegaly  Respiratory: Breath sounds equal bilaterally. decreased air entry with bilateral wheezing. scattered crackles  Cardiac: +S1/S2; RRR; no M/R/G; PMI non-displaced  Gastrointestinal: abdomen soft, NT/ND; no rebound or guarding; +BSx4  Back: spine midline, no bony tenderness or step-offs; no CVAT B/L  Extremities: WWP, no clubbing or cyanosis; no peripheral edema  Musculoskeletal: NROM x4; no joint swelling, tenderness or erythema  Vascular: 2+ radial, femoral, DP/PT pulses B/L  Dermatologic: skin warm, dry and intact; no rashes, wounds, or scars  Lymphatic: no submandibular or cervical LAD  Neurologic: AAOx3; CNII-XII grossly intact; no focal deficits  Psychiatric: affect and characteristics of appearance, verbalizations, behaviors are appropriate VITAL SIGNS:  T(C): 37 (10-09-21 @ 21:26), Max: 37.1 (10-09-21 @ 18:33)  T(F): 98.6 (10-09-21 @ 21:26), Max: 98.8 (10-09-21 @ 18:33)  HR: 86 (10-09-21 @ 21:26) (78 - 140)  BP: 114/57 (10-09-21 @ 21:26) (105/65 - 115/69)  BP(mean): --  RR: 20 (10-09-21 @ 21:26) (20 - 22)  SpO2: 94% (10-09-21 @ 21:26) (80% - 94%)  Wt(kg): --    PHYSICAL EXAM:    Constitutional: Resting comfortably in bed; NAD  Head: NC/AT  Eyes: PERRL, EOMI, anicteric sclera  ENT: no nasal discharge; uvula midline, no oropharyngeal erythema or exudates; MMM  Neck: supple; no JVD or thyromegaly  Respiratory: Breath sounds equal bilaterally. decreased air entry with scattered crackles, no wheezing.   Cardiac: +S1/S2; RRR; no M/R/G; PMI non-displaced  Gastrointestinal: abdomen soft, NT/ND; no rebound or guarding; +BSx4  Back: spine midline, no bony tenderness or step-offs; no CVAT B/L  Extremities: WWP, no clubbing or cyanosis; no peripheral edema  Musculoskeletal: NROM x4; no joint swelling, tenderness or erythema  Vascular: 2+ radial, femoral, DP/PT pulses B/L  Dermatologic: skin warm, dry and intact; no rashes, wounds, or scars  Lymphatic: no submandibular or cervical LAD  Neurologic: AAOx3; CNII-XII grossly intact; no focal deficits  Psychiatric: affect and characteristics of appearance, verbalizations, behaviors are appropriate

## 2021-10-09 NOTE — ED ADULT NURSE NOTE - NS ED NURSE REPORT GIVEN TO FT
received report from Greg DEL ROSARIO received report from Greg DEL ROSARIO/ report given to Berna DEL ROSARIO

## 2021-10-09 NOTE — ED ADULT NURSE NOTE - OBJECTIVE STATEMENT
Patient c/o of increasing SOB X 1 week w/ fatigue and weakness, denies chest pain, cough, fever or chills.  States received the Covid vaccine, was exposed to son who tested Covid + last week.  Patient states tested Covid + a few days ago.  No difficulty speaking in long sentences, O2 sat 80s on room air, denies lightheadedness/dizziness.  Immediate ugprade.  Isolation protocol observed.  PMHx DM, HTN.

## 2021-10-09 NOTE — ED ADULT NURSE NOTE - CHPI ED NUR TIMING2
Is This A New Presentation, Or A Follow-Up?: Skin Lesions Have Your Skin Lesions Been Treated?: not been treated worsening

## 2021-10-09 NOTE — ED PROVIDER NOTE - PROGRESS NOTE DETAILS
discussed with icu consulted. report if pt consistently above 88% on nasal cannula, ok to go to regional bed

## 2021-10-09 NOTE — CONSULT NOTE ADULT - ASSESSMENT
86 YO F with PMHx T2DM, HTN presented to [facility] ED on 10/9 with 1w Hx of generalized weakness, fatigue, SOB, and cough in the setting of positive COVID PCR as outpatient, stepped up to 7L for AHRF 2/2 COVID 86 YO F with PMHx T2DM, HTN presented to [facility] ED on 10/9 with 1w Hx of generalized weakness, fatigue, SOB, and cough in the setting of positive COVID PCR as outpatient, stepped up to 7L for AHRF 2/2 COVID    NEURO:  CORTES    PULM:  #AHRF 2/2 COVID PNA  B/L opacities on CXR. Pt with positive covid pcr. Vaccinated in early 2021. Desaturation to 85 on 6L NC, 93 on 15L NRB. VBG with pH 7.27 and CO2 48. Low s/f for PE given negative age adjusted d-dimer   - Change to HFNC 40/40%, spo2 goal >90   - No remdesivir given JUDITH  - Decadron 6mg QD    CV:  #Elevated troponin  .02 trop T on admission with no CP and no ischemic changes on EKG. Likely elevated given significant JUDITH  - Trend in AM   - AM EKG    GI:  #GI ppx  - PPI while on decadron     RENAL:  #JUDITH   Unk if underlying CKD, pt unaware of any previously know kidney dysfunction. Likely pre-renal given decreased PO intake x1 week. Pt endorses continued urine production.   - Ulytes  - Bladder scan to r/o obstructive  - 75cc/hr LR for maintenance fluids   - Trend   - Avoid nephrotoxic agents (pt taking irbesartan and metformin at home)    ENDO:  #T2DM  On metformin and glimepride at home. No insulin.  - mISS  - CC diet   - Monitor BG on steroids     Dispo 7L    Discussed with Dr. Flores          86 YO F with PMHx T2DM, HTN presented to [facility] ED on 10/9 with 1w Hx of generalized weakness, fatigue, SOB, and cough in the setting of positive COVID PCR as outpatient, stepped up to 7L for AHRF 2/2 COVID    NEURO:  CORTES    PULM:  #AHRF 2/2 COVID PNA  B/L opacities on CXR. Pt with positive covid pcr. Vaccinated in early 2021. Desaturation to 85 on 6L NC, 93 on 15L NRB. VBG with pH 7.27 and CO2 48. Low s/f for PE given negative age adjusted d-dimer   - Change to HFNC 40/40%, spo2 goal >90   - No remdesivir given JUDITH  - Decadron 6mg QD    CV:  #Elevated troponin  .02 trop T on admission with no CP and no ischemic changes on EKG. Likely elevated given significant JUDITH  - Trend in AM   - AM EKG    GI:  #GI ppx  - PPI while on decadron     RENAL:  #JUDITH   Unk if underlying CKD, pt unaware of any previously know kidney dysfunction. Likely pre-renal given decreased PO intake x1 week. Pt endorses continued urine production.   - Ulytes  - Bladder scan to r/o obstructive  - 75cc/hr LR for maintenance fluids   - Trend   - Avoid nephrotoxic agents (pt taking metformin at home)    ENDO:  #T2DM  On metformin and glimepride at home. No insulin.  - mISS  - CC diet   - Monitor BG on steroids     Dispo 7L    Discussed with Dr. Flores

## 2021-10-10 LAB
A1C WITH ESTIMATED AVERAGE GLUCOSE RESULT: 7.1 % — HIGH (ref 4–5.6)
ANION GAP SERPL CALC-SCNC: 16 MMOL/L — SIGNIFICANT CHANGE UP (ref 5–17)
BUN SERPL-MCNC: 56 MG/DL — HIGH (ref 7–23)
CALCIUM SERPL-MCNC: 7.8 MG/DL — LOW (ref 8.4–10.5)
CHLORIDE SERPL-SCNC: 101 MMOL/L — SIGNIFICANT CHANGE UP (ref 96–108)
CO2 SERPL-SCNC: 17 MMOL/L — LOW (ref 22–31)
COVID-19 SPIKE DOMAIN AB INTERP: POSITIVE
COVID-19 SPIKE DOMAIN ANTIBODY RESULT: >250 U/ML — HIGH
CREAT ?TM UR-MCNC: 104 MG/DL — SIGNIFICANT CHANGE UP
CREAT SERPL-MCNC: 4.5 MG/DL — HIGH (ref 0.5–1.3)
CRP SERPL-MCNC: 111.4 MG/L — HIGH (ref 0–4)
ESTIMATED AVERAGE GLUCOSE: 157 MG/DL — HIGH (ref 68–114)
GLUCOSE BLDC GLUCOMTR-MCNC: 163 MG/DL — HIGH (ref 70–99)
GLUCOSE BLDC GLUCOMTR-MCNC: 238 MG/DL — HIGH (ref 70–99)
GLUCOSE SERPL-MCNC: 192 MG/DL — HIGH (ref 70–99)
GRAM STN FLD: SIGNIFICANT CHANGE UP
HCT VFR BLD CALC: 40.6 % — SIGNIFICANT CHANGE UP (ref 34.5–45)
HGB BLD-MCNC: 13.4 G/DL — SIGNIFICANT CHANGE UP (ref 11.5–15.5)
MAGNESIUM SERPL-MCNC: 2.3 MG/DL — SIGNIFICANT CHANGE UP (ref 1.6–2.6)
MCHC RBC-ENTMCNC: 29.6 PG — SIGNIFICANT CHANGE UP (ref 27–34)
MCHC RBC-ENTMCNC: 33 GM/DL — SIGNIFICANT CHANGE UP (ref 32–36)
MCV RBC AUTO: 89.6 FL — SIGNIFICANT CHANGE UP (ref 80–100)
NRBC # BLD: 0 /100 WBCS — SIGNIFICANT CHANGE UP (ref 0–0)
OSMOLALITY UR: 355 MOSM/KG — SIGNIFICANT CHANGE UP (ref 300–900)
PHOSPHATE SERPL-MCNC: 5.2 MG/DL — HIGH (ref 2.5–4.5)
PLATELET # BLD AUTO: 220 K/UL — SIGNIFICANT CHANGE UP (ref 150–400)
POTASSIUM SERPL-MCNC: 4.2 MMOL/L — SIGNIFICANT CHANGE UP (ref 3.5–5.3)
POTASSIUM SERPL-SCNC: 4.2 MMOL/L — SIGNIFICANT CHANGE UP (ref 3.5–5.3)
RBC # BLD: 4.53 M/UL — SIGNIFICANT CHANGE UP (ref 3.8–5.2)
RBC # FLD: 13.6 % — SIGNIFICANT CHANGE UP (ref 10.3–14.5)
SARS-COV-2 IGG+IGM SERPL QL IA: >250 U/ML — HIGH
SARS-COV-2 IGG+IGM SERPL QL IA: POSITIVE
SODIUM SERPL-SCNC: 134 MMOL/L — LOW (ref 135–145)
SODIUM UR-SCNC: 36 MMOL/L — SIGNIFICANT CHANGE UP
TROPONIN T SERPL-MCNC: 0.01 NG/ML — SIGNIFICANT CHANGE UP (ref 0–0.01)
UUN UR-MCNC: 473 MG/DL — SIGNIFICANT CHANGE UP
WBC # BLD: 8.28 K/UL — SIGNIFICANT CHANGE UP (ref 3.8–10.5)
WBC # FLD AUTO: 8.28 K/UL — SIGNIFICANT CHANGE UP (ref 3.8–10.5)

## 2021-10-10 PROCEDURE — 99233 SBSQ HOSP IP/OBS HIGH 50: CPT | Mod: GC

## 2021-10-10 PROCEDURE — 76775 US EXAM ABDO BACK WALL LIM: CPT | Mod: 26

## 2021-10-10 RX ORDER — NIFEDIPINE 30 MG
1 TABLET, EXTENDED RELEASE 24 HR ORAL
Qty: 0 | Refills: 0 | DISCHARGE

## 2021-10-10 RX ORDER — INSULIN LISPRO 100/ML
VIAL (ML) SUBCUTANEOUS AT BEDTIME
Refills: 0 | Status: DISCONTINUED | OUTPATIENT
Start: 2021-10-10 | End: 2021-10-11

## 2021-10-10 RX ORDER — SODIUM CHLORIDE 9 MG/ML
1000 INJECTION, SOLUTION INTRAVENOUS
Refills: 0 | Status: DISCONTINUED | OUTPATIENT
Start: 2021-10-10 | End: 2021-10-21

## 2021-10-10 RX ORDER — SIMVASTATIN 20 MG/1
20 TABLET, FILM COATED ORAL AT BEDTIME
Refills: 0 | Status: DISCONTINUED | OUTPATIENT
Start: 2021-10-10 | End: 2021-10-21

## 2021-10-10 RX ORDER — DEXTROSE 50 % IN WATER 50 %
25 SYRINGE (ML) INTRAVENOUS ONCE
Refills: 0 | Status: DISCONTINUED | OUTPATIENT
Start: 2021-10-10 | End: 2021-10-21

## 2021-10-10 RX ORDER — DEXTROSE 50 % IN WATER 50 %
15 SYRINGE (ML) INTRAVENOUS ONCE
Refills: 0 | Status: DISCONTINUED | OUTPATIENT
Start: 2021-10-10 | End: 2021-10-21

## 2021-10-10 RX ORDER — GLUCAGON INJECTION, SOLUTION 0.5 MG/.1ML
1 INJECTION, SOLUTION SUBCUTANEOUS ONCE
Refills: 0 | Status: DISCONTINUED | OUTPATIENT
Start: 2021-10-10 | End: 2021-10-21

## 2021-10-10 RX ORDER — DEXTROSE 50 % IN WATER 50 %
12.5 SYRINGE (ML) INTRAVENOUS ONCE
Refills: 0 | Status: DISCONTINUED | OUTPATIENT
Start: 2021-10-10 | End: 2021-10-21

## 2021-10-10 RX ORDER — BENZOCAINE AND MENTHOL 5; 1 G/100ML; G/100ML
1 LIQUID ORAL EVERY 4 HOURS
Refills: 0 | Status: DISCONTINUED | OUTPATIENT
Start: 2021-10-10 | End: 2021-10-10

## 2021-10-10 RX ORDER — METOPROLOL TARTRATE 50 MG
1 TABLET ORAL
Qty: 0 | Refills: 0 | DISCHARGE

## 2021-10-10 RX ORDER — PANTOPRAZOLE SODIUM 20 MG/1
40 TABLET, DELAYED RELEASE ORAL
Refills: 0 | Status: COMPLETED | OUTPATIENT
Start: 2021-10-10 | End: 2021-10-19

## 2021-10-10 RX ORDER — ENOXAPARIN SODIUM 100 MG/ML
30 INJECTION SUBCUTANEOUS ONCE
Refills: 0 | Status: DISCONTINUED | OUTPATIENT
Start: 2021-10-10 | End: 2021-10-10

## 2021-10-10 RX ORDER — HEPARIN SODIUM 5000 [USP'U]/ML
5000 INJECTION INTRAVENOUS; SUBCUTANEOUS EVERY 8 HOURS
Refills: 0 | Status: DISCONTINUED | OUTPATIENT
Start: 2021-10-10 | End: 2021-10-21

## 2021-10-10 RX ORDER — BENZOCAINE AND MENTHOL 5; 1 G/100ML; G/100ML
1 LIQUID ORAL EVERY 4 HOURS
Refills: 0 | Status: DISCONTINUED | OUTPATIENT
Start: 2021-10-10 | End: 2021-10-21

## 2021-10-10 RX ORDER — GLIMEPIRIDE 1 MG
2 TABLET ORAL
Qty: 0 | Refills: 0 | DISCHARGE

## 2021-10-10 RX ORDER — IPRATROPIUM/ALBUTEROL SULFATE 18-103MCG
3 AEROSOL WITH ADAPTER (GRAM) INHALATION ONCE
Refills: 0 | Status: COMPLETED | OUTPATIENT
Start: 2021-10-10 | End: 2021-10-10

## 2021-10-10 RX ORDER — METFORMIN HYDROCHLORIDE 850 MG/1
1000 TABLET ORAL
Qty: 0 | Refills: 0 | DISCHARGE

## 2021-10-10 RX ORDER — AMLODIPINE BESYLATE 2.5 MG/1
1 TABLET ORAL
Qty: 0 | Refills: 0 | DISCHARGE

## 2021-10-10 RX ORDER — SODIUM CHLORIDE 9 MG/ML
1000 INJECTION INTRAMUSCULAR; INTRAVENOUS; SUBCUTANEOUS
Refills: 0 | Status: DISCONTINUED | OUTPATIENT
Start: 2021-10-10 | End: 2021-10-10

## 2021-10-10 RX ORDER — SIMVASTATIN 20 MG/1
1 TABLET, FILM COATED ORAL
Qty: 0 | Refills: 0 | DISCHARGE

## 2021-10-10 RX ADMIN — Medication 650 MILLIGRAM(S): at 14:28

## 2021-10-10 RX ADMIN — Medication 3 MILLILITER(S): at 23:32

## 2021-10-10 RX ADMIN — HEPARIN SODIUM 5000 UNIT(S): 5000 INJECTION INTRAVENOUS; SUBCUTANEOUS at 22:23

## 2021-10-10 RX ADMIN — HEPARIN SODIUM 5000 UNIT(S): 5000 INJECTION INTRAVENOUS; SUBCUTANEOUS at 06:40

## 2021-10-10 RX ADMIN — HEPARIN SODIUM 5000 UNIT(S): 5000 INJECTION INTRAVENOUS; SUBCUTANEOUS at 14:57

## 2021-10-10 RX ADMIN — SIMVASTATIN 20 MILLIGRAM(S): 20 TABLET, FILM COATED ORAL at 22:27

## 2021-10-10 RX ADMIN — Medication 6 MILLIGRAM(S): at 22:22

## 2021-10-10 RX ADMIN — SODIUM CHLORIDE 75 MILLILITER(S): 9 INJECTION INTRAMUSCULAR; INTRAVENOUS; SUBCUTANEOUS at 03:08

## 2021-10-10 RX ADMIN — PANTOPRAZOLE SODIUM 40 MILLIGRAM(S): 20 TABLET, DELAYED RELEASE ORAL at 06:41

## 2021-10-10 RX ADMIN — BENZOCAINE AND MENTHOL 1 LOZENGE: 5; 1 LIQUID ORAL at 22:22

## 2021-10-10 RX ADMIN — Medication 650 MILLIGRAM(S): at 15:28

## 2021-10-10 NOTE — PROGRESS NOTE ADULT - ASSESSMENT
84 yo F with PMHx T2DM, HLD, HTN presented to [facility] ED on 10/9 with 1w Hx of generalized weakness, fatigue, SOB, and cough in the setting of positive COVID PCR as outpatient. Patient admitted to [facility] for tele monitoring for AHRF i/s/o sepsis 2/2 COVID PNA.      NEURO  No active issues.     CARDIOVASCULAR  #HTN    Pt home meds include amlodipine 10 mg qd, metoprolol ER succinate 100mg qd and nifedipine 60mg ED qd.   - Holding home meds for now    #HLD  Pt home meds include simvastatin 20 mg qhs.  - Holding home meds for now, pt currently normotensive     #Troponemia  trop 0.02 on arrival, no past cardiac medical history except for HTN/HLD. EKG was NSR, 77 bpm, TWI in III.  -Trop 0.01 repeat labs (10/1)   -Get collateral if pt has any cardiac medical history    PULMONARY    #Acute hypoxemic respiratory failure i/s/o Sepsis 2/2 COVID PNA  - started Dexamethasone 6 mg 10 day course   - Remdesivir held due to impaired renal function   - Saturating 85-90% on HFNC 50/50     Plan:   - c/w Dexamethasone 6 mg 10 day course (10/10 - 10/19)  - wean O2 as tolerated       RENAL    #Acute kidney injury, possibly in the setting of decrease PO intake/dehydration  - BUN/Cr  51/4.58    Plan:   - f/u renal US   - f/u urine Na, Cr, urea   - q/4 bladder scan to monitor for urinary retention   - Avoid nephrotoxic meds, renally dose medications   - continue to trend BUN/Cr      #Hyponatremia i/s/o decreased PO intake/dehydration  - Na 133 on arrival > 134     Plan:   - encourage PO intake   - c/w NS 75ml/hr     ENDO  #T2DM  - Pt home meds include glimepiride 2mg BID and metformin 1g qd.  - A1c 7.1     Plan:  - ISS  - consistent carb diet     GI  No active issues    ID    #Sepsis 2/2 COVID PNA  - Treatment as above (AHRF i/s/o sepsis 2/2 COVID PNA)    HEME/ONC  No active issues      PROPHYLACTIC MEASURES  F: IV hydration with NS @ 75cc/hour  E: Replete PRN  D: DASH/TLC, Consistent Carb   DVT PPx: Heparin SubQ  GI PPx: protonix 40mg PO qd   CODE: FULL  Dispo: [facility]

## 2021-10-10 NOTE — PROGRESS NOTE ADULT - ATTENDING COMMENTS
Patient seen and examined with house-staff during bedside rounds.  Resident note read, including vitals, physical findings, laboratory data, and radiological reports.   Revisions included below.  Direct personal management at bed side and extensive interpretation of the data.  Plan was outlined and discussed in details with the housestaff.  Decision making of high complexity  Action taken for acute disease activity to reflect the level of care provided:  - medication reconciliation  - review laboratory data  Admitted with acute hypoxic patient was admitted with acute hypoxic respiratory failure secondary Covid pneumonia and end-stage renal on Decadron.  No evidence of start weaning off the high flow nasal cannula.

## 2021-10-10 NOTE — PROGRESS NOTE ADULT - SUBJECTIVE AND OBJECTIVE BOX
*INCOMPLETE* INTERVAL HPI/OVERNIGHT EVENTS:    OVERNIGHT: No overnight events. Tolerated HFNC. Pt remained afebrile and hemodynamically stable.     SUBJECTIVE: Patient seen and examined at bedside. Endorses improvement in appetite and PO intake. Continues to endorse cough.         ROS: 12pt ROS otherwise negative unless stated above.     OBJECTIVE:    VITAL SIGNS:  ICU Vital Signs Last 24 Hrs  T(C): 37.7 (10 Oct 2021 09:44), Max: 37.7 (10 Oct 2021 09:44)  T(F): 99.8 (10 Oct 2021 09:44), Max: 99.8 (10 Oct 2021 09:44)  HR: 74 (10 Oct 2021 09:44) (62 - 140)  BP: 130/67 (10 Oct 2021 05:38) (105/65 - 135/60)  BP(mean): 92 (10 Oct 2021 05:38) (92 - 92)  ABP: --  ABP(mean): --  RR: 22 (10 Oct 2021 05:52) (20 - 22)  SpO2: 90% (10 Oct 2021 09:44) (80% - 96%)        10-09 @ 07:01  -  10-10 @ 07:00  --------------------------------------------------------  IN: 225 mL / OUT: 450 mL / NET: -225 mL      CAPILLARY BLOOD GLUCOSE          PHYSICAL EXAM:  General: NAD, comfortable  HEENT: PERRL, clear conjunctiva, no scleral icterus,MMM  Neck: supple, no JVD  Respiratory: Bilateral inspiratory crackles, mild wheezing   Cardiovascular: RRR, normal S1S2, no M/R/G  Vascular: 2+ radial and DP pulses  Abdomen: soft, NT/ND, bowel sounds in all four quadrants, no palpable masses  Extremities: WWP, no clubbing, cyanosis, or edema  Skin: Dry skin in bilateral lower extremities   Neuro: A&O x 3, no focal deficits     MEDICATIONS:  MEDICATIONS  (STANDING):  dexAMETHasone     Tablet 6 milliGRAM(s) Oral every 12 hours  dextrose 40% Gel 15 Gram(s) Oral once  dextrose 5%. 1000 milliLiter(s) (50 mL/Hr) IV Continuous <Continuous>  dextrose 5%. 1000 milliLiter(s) (100 mL/Hr) IV Continuous <Continuous>  dextrose 50% Injectable 25 Gram(s) IV Push once  dextrose 50% Injectable 12.5 Gram(s) IV Push once  dextrose 50% Injectable 25 Gram(s) IV Push once  glucagon  Injectable 1 milliGRAM(s) IntraMuscular once  heparin   Injectable 5000 Unit(s) SubCutaneous every 8 hours  insulin lispro (ADMELOG) corrective regimen sliding scale   SubCutaneous at bedtime  pantoprazole    Tablet 40 milliGRAM(s) Oral before breakfast  simvastatin 20 milliGRAM(s) Oral at bedtime  sodium chloride 0.9%. 1000 milliLiter(s) (75 mL/Hr) IV Continuous <Continuous>    MEDICATIONS  (PRN):  acetaminophen   Tablet .. 650 milliGRAM(s) Oral every 6 hours PRN Temp greater or equal to 38C (100.4F), Mild Pain (1 - 3)      ALLERGIES:  Allergies    Allergy Status Unknown    Intolerances        LABS:                        13.4   8.28  )-----------( 220      ( 10 Oct 2021 07:40 )             40.6     10-10    134<L>  |  101  |  56<H>  ----------------------------<  192<H>  4.2   |  17<L>  |  4.50<H>    Ca    7.8<L>      10 Oct 2021 07:39  Phos  5.2     10-10  Mg     2.3     10-10    TPro  7.2  /  Alb  3.6  /  TBili  0.2  /  DBili  x   /  AST  22  /  ALT  12  /  AlkPhos  41  10-09    PT/INR - ( 09 Oct 2021 18:05 )   PT: 12.3 sec;   INR: 1.03          PTT - ( 09 Oct 2021 18:05 )  PTT:30.8 sec      RADIOLOGY & ADDITIONAL TESTS: Reviewed.

## 2021-10-11 LAB
-  COAGULASE NEGATIVE STAPHYLOCOCCUS: SIGNIFICANT CHANGE UP
ANION GAP SERPL CALC-SCNC: 15 MMOL/L — SIGNIFICANT CHANGE UP (ref 5–17)
BASOPHILS # BLD AUTO: 0.01 K/UL — SIGNIFICANT CHANGE UP (ref 0–0.2)
BASOPHILS NFR BLD AUTO: 0.1 % — SIGNIFICANT CHANGE UP (ref 0–2)
BUN SERPL-MCNC: 52 MG/DL — HIGH (ref 7–23)
CALCIUM SERPL-MCNC: 8.3 MG/DL — LOW (ref 8.4–10.5)
CHLORIDE SERPL-SCNC: 98 MMOL/L — SIGNIFICANT CHANGE UP (ref 96–108)
CO2 SERPL-SCNC: 20 MMOL/L — LOW (ref 22–31)
CREAT SERPL-MCNC: 3.59 MG/DL — HIGH (ref 0.5–1.3)
CRP SERPL-MCNC: 73 MG/L — HIGH (ref 0–4)
D DIMER BLD IA.RAPID-MCNC: 348 NG/ML DDU — HIGH
EOSINOPHIL # BLD AUTO: 0 K/UL — SIGNIFICANT CHANGE UP (ref 0–0.5)
EOSINOPHIL NFR BLD AUTO: 0 % — SIGNIFICANT CHANGE UP (ref 0–6)
ERYTHROCYTE [SEDIMENTATION RATE] IN BLOOD: 65 MM/HR — HIGH
FERRITIN SERPL-MCNC: 332 NG/ML — HIGH (ref 15–150)
GLUCOSE BLDC GLUCOMTR-MCNC: 206 MG/DL — HIGH (ref 70–99)
GLUCOSE BLDC GLUCOMTR-MCNC: 273 MG/DL — HIGH (ref 70–99)
GLUCOSE SERPL-MCNC: 283 MG/DL — HIGH (ref 70–99)
HCT VFR BLD CALC: 43.5 % — SIGNIFICANT CHANGE UP (ref 34.5–45)
HGB BLD-MCNC: 14.1 G/DL — SIGNIFICANT CHANGE UP (ref 11.5–15.5)
IMM GRANULOCYTES NFR BLD AUTO: 1.4 % — SIGNIFICANT CHANGE UP (ref 0–1.5)
LYMPHOCYTES # BLD AUTO: 0.99 K/UL — LOW (ref 1–3.3)
LYMPHOCYTES # BLD AUTO: 11.3 % — LOW (ref 13–44)
MAGNESIUM SERPL-MCNC: 2.3 MG/DL — SIGNIFICANT CHANGE UP (ref 1.6–2.6)
MCHC RBC-ENTMCNC: 28.5 PG — SIGNIFICANT CHANGE UP (ref 27–34)
MCHC RBC-ENTMCNC: 32.4 GM/DL — SIGNIFICANT CHANGE UP (ref 32–36)
MCV RBC AUTO: 88.1 FL — SIGNIFICANT CHANGE UP (ref 80–100)
METHOD TYPE: SIGNIFICANT CHANGE UP
MONOCYTES # BLD AUTO: 0.39 K/UL — SIGNIFICANT CHANGE UP (ref 0–0.9)
MONOCYTES NFR BLD AUTO: 4.4 % — SIGNIFICANT CHANGE UP (ref 2–14)
NEUTROPHILS # BLD AUTO: 7.27 K/UL — SIGNIFICANT CHANGE UP (ref 1.8–7.4)
NEUTROPHILS NFR BLD AUTO: 82.8 % — HIGH (ref 43–77)
NRBC # BLD: 0 /100 WBCS — SIGNIFICANT CHANGE UP (ref 0–0)
PHOSPHATE SERPL-MCNC: 3.8 MG/DL — SIGNIFICANT CHANGE UP (ref 2.5–4.5)
PLATELET # BLD AUTO: 243 K/UL — SIGNIFICANT CHANGE UP (ref 150–400)
POTASSIUM SERPL-MCNC: 4.3 MMOL/L — SIGNIFICANT CHANGE UP (ref 3.5–5.3)
POTASSIUM SERPL-SCNC: 4.3 MMOL/L — SIGNIFICANT CHANGE UP (ref 3.5–5.3)
RBC # BLD: 4.94 M/UL — SIGNIFICANT CHANGE UP (ref 3.8–5.2)
RBC # FLD: 13.9 % — SIGNIFICANT CHANGE UP (ref 10.3–14.5)
SODIUM SERPL-SCNC: 133 MMOL/L — LOW (ref 135–145)
WBC # BLD: 8.78 K/UL — SIGNIFICANT CHANGE UP (ref 3.8–10.5)
WBC # FLD AUTO: 8.78 K/UL — SIGNIFICANT CHANGE UP (ref 3.8–10.5)

## 2021-10-11 PROCEDURE — 99233 SBSQ HOSP IP/OBS HIGH 50: CPT | Mod: GC

## 2021-10-11 RX ORDER — DEXAMETHASONE 0.5 MG/5ML
6 ELIXIR ORAL DAILY
Refills: 0 | Status: DISCONTINUED | OUTPATIENT
Start: 2021-10-11 | End: 2021-10-11

## 2021-10-11 RX ORDER — DEXAMETHASONE 0.5 MG/5ML
6 ELIXIR ORAL EVERY 24 HOURS
Refills: 0 | Status: DISCONTINUED | OUTPATIENT
Start: 2021-10-11 | End: 2021-10-11

## 2021-10-11 RX ORDER — INSULIN LISPRO 100/ML
VIAL (ML) SUBCUTANEOUS
Refills: 0 | Status: DISCONTINUED | OUTPATIENT
Start: 2021-10-11 | End: 2021-10-14

## 2021-10-11 RX ORDER — DEXAMETHASONE 0.5 MG/5ML
6 ELIXIR ORAL EVERY 24 HOURS
Refills: 0 | Status: COMPLETED | OUTPATIENT
Start: 2021-10-11 | End: 2021-10-18

## 2021-10-11 RX ADMIN — BENZOCAINE AND MENTHOL 1 LOZENGE: 5; 1 LIQUID ORAL at 03:45

## 2021-10-11 RX ADMIN — Medication 3: at 21:34

## 2021-10-11 RX ADMIN — HEPARIN SODIUM 5000 UNIT(S): 5000 INJECTION INTRAVENOUS; SUBCUTANEOUS at 14:28

## 2021-10-11 RX ADMIN — HEPARIN SODIUM 5000 UNIT(S): 5000 INJECTION INTRAVENOUS; SUBCUTANEOUS at 22:00

## 2021-10-11 RX ADMIN — BENZOCAINE AND MENTHOL 1 LOZENGE: 5; 1 LIQUID ORAL at 18:46

## 2021-10-11 RX ADMIN — PANTOPRAZOLE SODIUM 40 MILLIGRAM(S): 20 TABLET, DELAYED RELEASE ORAL at 05:20

## 2021-10-11 RX ADMIN — Medication 2: at 17:23

## 2021-10-11 RX ADMIN — BENZOCAINE AND MENTHOL 1 LOZENGE: 5; 1 LIQUID ORAL at 07:24

## 2021-10-11 RX ADMIN — SIMVASTATIN 20 MILLIGRAM(S): 20 TABLET, FILM COATED ORAL at 21:36

## 2021-10-11 RX ADMIN — Medication 6 MILLIGRAM(S): at 17:51

## 2021-10-11 RX ADMIN — Medication 6 MILLIGRAM(S): at 21:35

## 2021-10-11 RX ADMIN — HEPARIN SODIUM 5000 UNIT(S): 5000 INJECTION INTRAVENOUS; SUBCUTANEOUS at 05:20

## 2021-10-11 NOTE — PROGRESS NOTE ADULT - SUBJECTIVE AND OBJECTIVE BOX
**INCOMPLETE NOTE    INTERVAL HPI/OVERNIGHT EVENTS:    OVERNIGHT: No overnight events.  SUBJECTIVE: Patient seen and examined at bedside.     ROS:  CV: Denies chest pain  Resp: Denies SOB  GI: Denies abdominal pain, constipation, diarrhea, nausea, vomiting  : Denies dysuria  ID: Denies fevers, chills  MSK: Denies joint pain     OBJECTIVE:    VITAL SIGNS:  ICU Vital Signs Last 24 Hrs  T(C): 36.7 (10 Oct 2021 22:03), Max: 37.7 (10 Oct 2021 09:44)  T(F): 98 (10 Oct 2021 22:03), Max: 99.8 (10 Oct 2021 09:44)  HR: 93 (11 Oct 2021 00:14) (66 - 93)  BP: 137/65 (11 Oct 2021 00:00) (1/- - 155/70)  BP(mean): 94 (11 Oct 2021 00:00) (84 - 103)  ABP: --  ABP(mean): --  RR: 24 (11 Oct 2021 00:14) (18 - 25)  SpO2: 90% (11 Oct 2021 00:14) (89% - 93%)        10-09 @ 07:01  -  10-10 @ 07:00  --------------------------------------------------------  IN: 225 mL / OUT: 450 mL / NET: -225 mL    10-10 @ 07:01  -  10-11 @ 05:12  --------------------------------------------------------  IN: 657 mL / OUT: 1900 mL / NET: -1243 mL      CAPILLARY BLOOD GLUCOSE      POCT Blood Glucose.: 163 mg/dL (10 Oct 2021 21:38)      PHYSICAL EXAM:  General: NAD, comfortable  HEENT: NCAT, PERRL, clear conjunctiva, no scleral icterus  Neck: supple, no JVD  Respiratory: CTA b/l, no wheezing, rhonchi, rales  Cardiovascular: RRR, normal S1S2, no M/R/G  Vascular: 2+ radial and DP pulses  Abdomen: soft, NT/ND, bowel sounds in all four quadrants, no palpable masses  Extremities: WWP, no clubbing, cyanosis, or edema  Skin: No rashes present  Neuro:     MEDICATIONS:  MEDICATIONS  (STANDING):  dexAMETHasone     Tablet 6 milliGRAM(s) Oral every 12 hours  dextrose 40% Gel 15 Gram(s) Oral once  dextrose 5%. 1000 milliLiter(s) (50 mL/Hr) IV Continuous <Continuous>  dextrose 5%. 1000 milliLiter(s) (100 mL/Hr) IV Continuous <Continuous>  dextrose 50% Injectable 25 Gram(s) IV Push once  dextrose 50% Injectable 12.5 Gram(s) IV Push once  dextrose 50% Injectable 25 Gram(s) IV Push once  glucagon  Injectable 1 milliGRAM(s) IntraMuscular once  heparin   Injectable 5000 Unit(s) SubCutaneous every 8 hours  insulin lispro (ADMELOG) corrective regimen sliding scale   SubCutaneous at bedtime  pantoprazole    Tablet 40 milliGRAM(s) Oral before breakfast  simvastatin 20 milliGRAM(s) Oral at bedtime    MEDICATIONS  (PRN):  acetaminophen   Tablet .. 650 milliGRAM(s) Oral every 6 hours PRN Temp greater or equal to 38C (100.4F), Mild Pain (1 - 3)  benzocaine 15 mG/menthol 3.6 mG Lozenge 1 Lozenge Oral every 4 hours PRN Sore Throat      ALLERGIES:  Allergies    Allergy Status Unknown    Intolerances        LABS:                        13.4   8.28  )-----------( 220      ( 10 Oct 2021 07:40 )             40.6     10-10    134<L>  |  101  |  56<H>  ----------------------------<  192<H>  4.2   |  17<L>  |  4.50<H>    Ca    7.8<L>      10 Oct 2021 07:39  Phos  5.2     10-10  Mg     2.3     10-10    TPro  7.2  /  Alb  3.6  /  TBili  0.2  /  DBili  x   /  AST  22  /  ALT  12  /  AlkPhos  41  10-09    PT/INR - ( 09 Oct 2021 18:05 )   PT: 12.3 sec;   INR: 1.03          PTT - ( 09 Oct 2021 18:05 )  PTT:30.8 sec      RADIOLOGY & ADDITIONAL TESTS: Reviewed.   INTERVAL HPI/OVERNIGHT EVENTS:     OVERNIGHT: Temporarily placed on HFNC overnight w/ improvement O2 saturation. Given benzocaine lozenges for persistent cough.       SUBJECTIVE: Patient seen and examined at bedside. States that cough has improved w/ lozenges. Continues to have SOB.     ROS: 12 pt ROS otherwise negative unless stated above.     OBJECTIVE:    VITAL SIGNS:  ICU Vital Signs Last 24 Hrs  T(C): 36.7 (10 Oct 2021 22:03), Max: 37.7 (10 Oct 2021 09:44)  T(F): 98 (10 Oct 2021 22:03), Max: 99.8 (10 Oct 2021 09:44)  HR: 93 (11 Oct 2021 00:14) (66 - 93)  BP: 137/65 (11 Oct 2021 00:00) (1/- - 155/70)  BP(mean): 94 (11 Oct 2021 00:00) (84 - 103)  ABP: --  ABP(mean): --  RR: 24 (11 Oct 2021 00:14) (18 - 25)  SpO2: 90% (11 Oct 2021 00:14) (89% - 93%)        10-09 @ 07:01  -  10-10 @ 07:00  --------------------------------------------------------  IN: 225 mL / OUT: 450 mL / NET: -225 mL    10-10 @ 07:01  -  10-11 @ 05:12  --------------------------------------------------------  IN: 657 mL / OUT: 1900 mL / NET: -1243 mL      CAPILLARY BLOOD GLUCOSE      POCT Blood Glucose.: 163 mg/dL (10 Oct 2021 21:38)      PHYSICAL EXAM:  General: NAD, comfortable  HEENT: NCAT, PERRL, clear conjunctiva, no scleral icterus  Neck: supple, no JVD  Respiratory: Inspiratory crackles in bilateral lung fields, more predominant on right, NC in place   Cardiovascular: RRR, normal S1S2, no M/R/G  Vascular: 2+ radial and DP pulses  Abdomen: soft, NT/ND, bowel sounds in all four quadrants, no palpable masses  Extremities: WWP, no clubbing, cyanosis, or edema  Skin: No rashes present  Neuro: A&Ox3, no focal deficits     MEDICATIONS:  MEDICATIONS  (STANDING):  dexAMETHasone     Tablet 6 milliGRAM(s) Oral every 12 hours  dextrose 40% Gel 15 Gram(s) Oral once  dextrose 5%. 1000 milliLiter(s) (50 mL/Hr) IV Continuous <Continuous>  dextrose 5%. 1000 milliLiter(s) (100 mL/Hr) IV Continuous <Continuous>  dextrose 50% Injectable 25 Gram(s) IV Push once  dextrose 50% Injectable 12.5 Gram(s) IV Push once  dextrose 50% Injectable 25 Gram(s) IV Push once  glucagon  Injectable 1 milliGRAM(s) IntraMuscular once  heparin   Injectable 5000 Unit(s) SubCutaneous every 8 hours  insulin lispro (ADMELOG) corrective regimen sliding scale   SubCutaneous at bedtime  pantoprazole    Tablet 40 milliGRAM(s) Oral before breakfast  simvastatin 20 milliGRAM(s) Oral at bedtime    MEDICATIONS  (PRN):  acetaminophen   Tablet .. 650 milliGRAM(s) Oral every 6 hours PRN Temp greater or equal to 38C (100.4F), Mild Pain (1 - 3)  benzocaine 15 mG/menthol 3.6 mG Lozenge 1 Lozenge Oral every 4 hours PRN Sore Throat      ALLERGIES:  Allergies    Allergy Status Unknown    Intolerances        LABS:                        13.4   8.28  )-----------( 220      ( 10 Oct 2021 07:40 )             40.6     10-10    134<L>  |  101  |  56<H>  ----------------------------<  192<H>  4.2   |  17<L>  |  4.50<H>    Ca    7.8<L>      10 Oct 2021 07:39  Phos  5.2     10-10  Mg     2.3     10-10    TPro  7.2  /  Alb  3.6  /  TBili  0.2  /  DBili  x   /  AST  22  /  ALT  12  /  AlkPhos  41  10-09    PT/INR - ( 09 Oct 2021 18:05 )   PT: 12.3 sec;   INR: 1.03          PTT - ( 09 Oct 2021 18:05 )  PTT:30.8 sec      RADIOLOGY & ADDITIONAL TESTS: Reviewed.

## 2021-10-11 NOTE — PROGRESS NOTE ADULT - ATTENDING COMMENTS
Acute hypoxic respiratory failure with COVID pneumonia with CKD. HFNC was d/c today. On nasal cannula. OOB to chair, incentive spirometry.  Continue dexamethasone. Rest as above.

## 2021-10-11 NOTE — PROGRESS NOTE ADULT - ASSESSMENT
86 yo F with PMHx T2DM, HLD, HTN presented to [facility] ED on 10/9 with 1w Hx of generalized weakness, fatigue, SOB, and cough in the setting of positive COVID PCR as outpatient. Patient admitted to [facility] for tele monitoring for AHRF i/s/o sepsis 2/2 COVID PNA.      NEURO  No active issues.     CARDIOVASCULAR  #HTN    Pt home meds include amlodipine 10 mg qd, metoprolol ER succinate 100mg qd and nifedipine 60mg ED qd.   - Holding home meds for now    #HLD  Pt home meds include simvastatin 20 mg qhs.  - Holding home meds for now, pt currently normotensive     #Troponemia  trop 0.02 on arrival, no past cardiac medical history except for HTN/HLD. EKG was NSR, 77 bpm, TWI in III.  -Trop 0.01 repeat labs (10/1)   -Get collateral if pt has any cardiac medical history    PULMONARY    #Acute hypoxemic respiratory failure i/s/o Sepsis 2/2 COVID PNA  - started Dexamethasone 6 mg 10 day course   - Remdesivir held due to impaired renal function   - Saturating 85-90% on HFNC 50/50     Plan:   - c/w Dexamethasone 6 mg 10 day course (10/10 - 10/19)  - wean O2 as tolerated       RENAL    #Acute kidney injury, possibly in the setting of decrease PO intake/dehydration  - BUN/Cr  51/4.58    Plan:   - f/u renal US   - f/u urine Na, Cr, urea   - q/4 bladder scan to monitor for urinary retention   - Avoid nephrotoxic meds, renally dose medications   - continue to trend BUN/Cr      #Hyponatremia i/s/o decreased PO intake/dehydration  - Na 133 on arrival > 134     Plan:   - encourage PO intake   - c/w NS 75ml/hr     ENDO  #T2DM  - Pt home meds include glimepiride 2mg BID and metformin 1g qd.  - A1c 7.1     Plan:  - ISS  - consistent carb diet     GI  No active issues    ID    #Sepsis 2/2 COVID PNA  - Treatment as above (AHRF i/s/o sepsis 2/2 COVID PNA)    HEME/ONC  No active issues      PROPHYLACTIC MEASURES  F: IV hydration with NS @ 75cc/hour  E: Replete PRN  D: DASH/TLC, Consistent Carb   DVT PPx: Heparin SubQ  GI PPx: protonix 40mg PO qd   CODE: FULL  Dispo: [facility]       86 yo F with PMHx T2DM, HLD, HTN presented to [facility] ED on 10/9 with 1w Hx of generalized weakness, fatigue, SOB, and cough in the setting of positive COVID PCR as outpatient. Patient admitted to [facility] for tele monitoring for AHRF i/s/o sepsis 2/2 COVID PNA.      NEURO  No active issues.     CARDIOVASCULAR  #HTN    Pt home meds include amlodipine 10 mg qd, metoprolol ER succinate 100mg qd and nifedipine 60mg ED qd.   - Holding home meds for now    #HLD  Pt home meds include simvastatin 20 mg qhs.  - Holding home meds for now, pt currently normotensive     #Troponemia    RESOLVED  trop 0.02 on arrival, no past cardiac medical history except for HTN/HLD. EKG was NSR, 77 bpm, TWI in III.  -Trop 0.01 repeat labs (10/1)       PULMONARY    #Acute hypoxemic respiratory failure i/s/o Sepsis 2/2 COVID PNA  - started Dexamethasone 6 mg 10 day course   - Remdesivir held due to impaired renal function   - Saturating 90%-95% on 6L NC     Plan:   - c/w Dexamethasone 6 mg 10 day course (10/10 - 10/19)  - wean O2 as tolerated       RENAL    #Acute kidney injury, possibly in the setting of decrease PO intake/dehydration  - BUN/Cr  51/4.58    Plan:   - FeNa 1.2 consistent w/ intrarenal etiology, likely 2/2 ATN   - q/4 bladder scan to monitor for urinary retention   - Avoid nephrotoxic meds, renally dose medications   - continue to trend BUN/Cr  - Urine output improving approx 60cc/hr     Plan:   - ATN improving, Cr downtrending  - Continue to monitor    #Hyponatremia i/s/o decreased PO intake/dehydration  - Na 133 on arrival > 134     Plan:   - encourage PO intake   - c/w NS 75ml/hr     ENDO  #T2DM  - Pt home meds include glimepiride 2mg BID and metformin 1g qd.  - A1c 7.1     Plan:  - ISS  - consistent carb diet     GI  No active issues    ID    #Sepsis 2/2 COVID PNA  - Treatment as above (AHRF i/s/o sepsis 2/2 COVID PNA)    HEME/ONC  No active issues      PROPHYLACTIC MEASURES  F: IV hydration with NS @ 75cc/hour  E: Replete PRN  D: DASH/TLC, Consistent Carb   DVT PPx: Heparin SubQ  GI PPx: protonix 40mg PO qd   CODE: FULL  Dispo: [facility]       86 yo F with PMHx T2DM, HLD, HTN presented to [facility] ED on 10/9 with 1w Hx of generalized weakness, fatigue, SOB, and cough in the setting of positive COVID PCR as outpatient. Patient admitted to [facility] for tele monitoring for AHRF i/s/o sepsis 2/2 COVID PNA.      NEURO  No active issues.     CARDIOVASCULAR  #HTN    Pt home meds include amlodipine 10 mg qd, metoprolol ER succinate 100mg qd and nifedipine 60mg ED qd.   - Holding home meds for now    #HLD  Pt home meds include simvastatin 20 mg qhs.  - Holding home meds for now, pt currently normotensive     #Troponemia    RESOLVED  trop 0.02 on arrival, no past cardiac medical history except for HTN/HLD. EKG was NSR, 77 bpm, TWI in III.  -Trop 0.01 repeat labs (10/1)       PULMONARY    #Acute hypoxemic respiratory failure i/s/o Sepsis 2/2 COVID PNA  - started Dexamethasone 6 mg 10 day course   - Remdesivir held due to impaired renal function   - Saturating 90%-95% on 6L NC     Plan:   - c/w Dexamethasone 6 mg 10 day course (10/10 - 10/19)  - wean O2 as tolerated       RENAL    #Acute kidney injury, possibly in the setting of decrease PO intake/dehydration  - BUN/Cr  51/4.58    Plan:   - FeNa 1.2 consistent w/ intrarenal etiology, likely 2/2 ATN   - q/4 bladder scan to monitor for urinary retention   - Avoid nephrotoxic meds, renally dose medications   - continue to trend BUN/Cr  - Urine output improving approx 60cc/hr     Plan:   - ATN improving, Cr downtrending  - Continue to monitor    #Hyponatremia i/s/o decreased PO intake/dehydration  - Na stable at approx. 130    Plan:   - encourage PO intake       ENDO  #T2DM  - Pt home meds include glimepiride 2mg BID and metformin 1g qd.  - A1c 7.1     Plan:  - ISS  - consistent carb diet     GI  No active issues    ID    #Sepsis 2/2 COVID PNA  - Treatment as above (AHRF i/s/o sepsis 2/2 COVID PNA)    HEME/ONC  No active issues      PROPHYLACTIC MEASURES  F: None  E: Replete PRN  D: DASH/TLC, Consistent Carb   DVT PPx: Heparin SubQ  GI PPx: protonix 40mg PO qd   CODE: FULL  Dispo: [facility]

## 2021-10-12 DIAGNOSIS — R63.8 OTHER SYMPTOMS AND SIGNS CONCERNING FOOD AND FLUID INTAKE: ICD-10-CM

## 2021-10-12 DIAGNOSIS — R77.8 OTHER SPECIFIED ABNORMALITIES OF PLASMA PROTEINS: ICD-10-CM

## 2021-10-12 DIAGNOSIS — E78.5 HYPERLIPIDEMIA, UNSPECIFIED: ICD-10-CM

## 2021-10-12 DIAGNOSIS — E11.9 TYPE 2 DIABETES MELLITUS WITHOUT COMPLICATIONS: ICD-10-CM

## 2021-10-12 DIAGNOSIS — E87.1 HYPO-OSMOLALITY AND HYPONATREMIA: ICD-10-CM

## 2021-10-12 DIAGNOSIS — I10 ESSENTIAL (PRIMARY) HYPERTENSION: ICD-10-CM

## 2021-10-12 DIAGNOSIS — U07.1 COVID-19: ICD-10-CM

## 2021-10-12 DIAGNOSIS — R78.81 BACTEREMIA: ICD-10-CM

## 2021-10-12 DIAGNOSIS — N17.9 ACUTE KIDNEY FAILURE, UNSPECIFIED: ICD-10-CM

## 2021-10-12 LAB
ALBUMIN SERPL ELPH-MCNC: 3.5 G/DL — SIGNIFICANT CHANGE UP (ref 3.3–5)
ALP SERPL-CCNC: 45 U/L — SIGNIFICANT CHANGE UP (ref 40–120)
ALT FLD-CCNC: 12 U/L — SIGNIFICANT CHANGE UP (ref 10–45)
ANION GAP SERPL CALC-SCNC: 13 MMOL/L — SIGNIFICANT CHANGE UP (ref 5–17)
ANION GAP SERPL CALC-SCNC: 17 MMOL/L — SIGNIFICANT CHANGE UP (ref 5–17)
APPEARANCE UR: CLEAR — SIGNIFICANT CHANGE UP
AST SERPL-CCNC: 21 U/L — SIGNIFICANT CHANGE UP (ref 10–40)
BACTERIA # UR AUTO: ABNORMAL /HPF
BASOPHILS # BLD AUTO: 0 K/UL — SIGNIFICANT CHANGE UP (ref 0–0.2)
BASOPHILS NFR BLD AUTO: 0 % — SIGNIFICANT CHANGE UP (ref 0–2)
BILIRUB SERPL-MCNC: 0.3 MG/DL — SIGNIFICANT CHANGE UP (ref 0.2–1.2)
BILIRUB UR-MCNC: NEGATIVE — SIGNIFICANT CHANGE UP
BUN SERPL-MCNC: 48 MG/DL — HIGH (ref 7–23)
BUN SERPL-MCNC: 49 MG/DL — HIGH (ref 7–23)
BURR CELLS BLD QL SMEAR: PRESENT — SIGNIFICANT CHANGE UP
CALCIUM SERPL-MCNC: 8.7 MG/DL — SIGNIFICANT CHANGE UP (ref 8.4–10.5)
CALCIUM SERPL-MCNC: 8.9 MG/DL — SIGNIFICANT CHANGE UP (ref 8.4–10.5)
CHLORIDE SERPL-SCNC: 102 MMOL/L — SIGNIFICANT CHANGE UP (ref 96–108)
CHLORIDE SERPL-SCNC: 104 MMOL/L — SIGNIFICANT CHANGE UP (ref 96–108)
CO2 SERPL-SCNC: 14 MMOL/L — LOW (ref 22–31)
CO2 SERPL-SCNC: 17 MMOL/L — LOW (ref 22–31)
COLOR SPEC: YELLOW — SIGNIFICANT CHANGE UP
CREAT SERPL-MCNC: 2.72 MG/DL — HIGH (ref 0.5–1.3)
CREAT SERPL-MCNC: 2.85 MG/DL — HIGH (ref 0.5–1.3)
DIFF PNL FLD: ABNORMAL
EOSINOPHIL # BLD AUTO: 0 K/UL — SIGNIFICANT CHANGE UP (ref 0–0.5)
EOSINOPHIL NFR BLD AUTO: 0 % — SIGNIFICANT CHANGE UP (ref 0–6)
EPI CELLS # UR: SIGNIFICANT CHANGE UP /HPF (ref 0–5)
GLUCOSE BLDC GLUCOMTR-MCNC: 112 MG/DL — HIGH (ref 70–99)
GLUCOSE BLDC GLUCOMTR-MCNC: 274 MG/DL — HIGH (ref 70–99)
GLUCOSE BLDC GLUCOMTR-MCNC: 324 MG/DL — HIGH (ref 70–99)
GLUCOSE SERPL-MCNC: 261 MG/DL — HIGH (ref 70–99)
GLUCOSE SERPL-MCNC: 264 MG/DL — HIGH (ref 70–99)
GLUCOSE UR QL: 500
HCT VFR BLD CALC: 43.6 % — SIGNIFICANT CHANGE UP (ref 34.5–45)
HGB BLD-MCNC: 13.5 G/DL — SIGNIFICANT CHANGE UP (ref 11.5–15.5)
KETONES UR-MCNC: NEGATIVE — SIGNIFICANT CHANGE UP
LEUKOCYTE ESTERASE UR-ACNC: ABNORMAL
LYMPHOCYTES # BLD AUTO: 0.81 K/UL — LOW (ref 1–3.3)
LYMPHOCYTES # BLD AUTO: 11.4 % — LOW (ref 13–44)
MAGNESIUM SERPL-MCNC: 2.3 MG/DL — SIGNIFICANT CHANGE UP (ref 1.6–2.6)
MAGNESIUM SERPL-MCNC: 2.3 MG/DL — SIGNIFICANT CHANGE UP (ref 1.6–2.6)
MANUAL SMEAR VERIFICATION: SIGNIFICANT CHANGE UP
MCHC RBC-ENTMCNC: 28.4 PG — SIGNIFICANT CHANGE UP (ref 27–34)
MCHC RBC-ENTMCNC: 31 GM/DL — LOW (ref 32–36)
MCV RBC AUTO: 91.6 FL — SIGNIFICANT CHANGE UP (ref 80–100)
MONOCYTES # BLD AUTO: 0.68 K/UL — SIGNIFICANT CHANGE UP (ref 0–0.9)
MONOCYTES NFR BLD AUTO: 9.6 % — SIGNIFICANT CHANGE UP (ref 2–14)
MYELOCYTES NFR BLD: 3.5 % — HIGH (ref 0–0)
NEUTROPHILS # BLD AUTO: 5.3 K/UL — SIGNIFICANT CHANGE UP (ref 1.8–7.4)
NEUTROPHILS NFR BLD AUTO: 69.3 % — SIGNIFICANT CHANGE UP (ref 43–77)
NEUTS BAND # BLD: 5.3 % — SIGNIFICANT CHANGE UP (ref 0–8)
NITRITE UR-MCNC: NEGATIVE — SIGNIFICANT CHANGE UP
PH UR: 6 — SIGNIFICANT CHANGE UP (ref 5–8)
PHOSPHATE SERPL-MCNC: 2 MG/DL — LOW (ref 2.5–4.5)
PHOSPHATE SERPL-MCNC: 2.9 MG/DL — SIGNIFICANT CHANGE UP (ref 2.5–4.5)
PLAT MORPH BLD: NORMAL — SIGNIFICANT CHANGE UP
PLATELET # BLD AUTO: 261 K/UL — SIGNIFICANT CHANGE UP (ref 150–400)
POIKILOCYTOSIS BLD QL AUTO: SLIGHT — SIGNIFICANT CHANGE UP
POLYCHROMASIA BLD QL SMEAR: SLIGHT — SIGNIFICANT CHANGE UP
POTASSIUM SERPL-MCNC: 5.2 MMOL/L — SIGNIFICANT CHANGE UP (ref 3.5–5.3)
POTASSIUM SERPL-MCNC: SIGNIFICANT CHANGE UP MMOL/L (ref 3.5–5.3)
POTASSIUM SERPL-SCNC: 5.2 MMOL/L — SIGNIFICANT CHANGE UP (ref 3.5–5.3)
POTASSIUM SERPL-SCNC: SIGNIFICANT CHANGE UP MMOL/L (ref 3.5–5.3)
PROT SERPL-MCNC: 7.4 G/DL — SIGNIFICANT CHANGE UP (ref 6–8.3)
PROT UR-MCNC: 100 MG/DL
RBC # BLD: 4.76 M/UL — SIGNIFICANT CHANGE UP (ref 3.8–5.2)
RBC # FLD: 13.9 % — SIGNIFICANT CHANGE UP (ref 10.3–14.5)
RBC BLD AUTO: ABNORMAL
RBC CASTS # UR COMP ASSIST: < 5 /HPF — SIGNIFICANT CHANGE UP
SODIUM SERPL-SCNC: 131 MMOL/L — LOW (ref 135–145)
SODIUM SERPL-SCNC: 136 MMOL/L — SIGNIFICANT CHANGE UP (ref 135–145)
SP GR SPEC: 1.02 — SIGNIFICANT CHANGE UP (ref 1–1.03)
UROBILINOGEN FLD QL: 0.2 E.U./DL — SIGNIFICANT CHANGE UP
VARIANT LYMPHS # BLD: 0.9 % — SIGNIFICANT CHANGE UP (ref 0–6)
WBC # BLD: 7.1 K/UL — SIGNIFICANT CHANGE UP (ref 3.8–10.5)
WBC # FLD AUTO: 7.1 K/UL — SIGNIFICANT CHANGE UP (ref 3.8–10.5)
WBC UR QL: > 10 /HPF

## 2021-10-12 PROCEDURE — 99232 SBSQ HOSP IP/OBS MODERATE 35: CPT | Mod: GC

## 2021-10-12 PROCEDURE — 71045 X-RAY EXAM CHEST 1 VIEW: CPT | Mod: 26

## 2021-10-12 PROCEDURE — 99223 1ST HOSP IP/OBS HIGH 75: CPT | Mod: GC

## 2021-10-12 RX ORDER — INSULIN LISPRO 100/ML
3 VIAL (ML) SUBCUTANEOUS
Refills: 0 | Status: DISCONTINUED | OUTPATIENT
Start: 2021-10-12 | End: 2021-10-12

## 2021-10-12 RX ORDER — AMLODIPINE BESYLATE 2.5 MG/1
5 TABLET ORAL ONCE
Refills: 0 | Status: COMPLETED | OUTPATIENT
Start: 2021-10-12 | End: 2021-10-12

## 2021-10-12 RX ORDER — METOPROLOL TARTRATE 50 MG
100 TABLET ORAL EVERY 24 HOURS
Refills: 0 | Status: DISCONTINUED | OUTPATIENT
Start: 2021-10-12 | End: 2021-10-21

## 2021-10-12 RX ORDER — INSULIN GLARGINE 100 [IU]/ML
6 INJECTION, SOLUTION SUBCUTANEOUS AT BEDTIME
Refills: 0 | Status: DISCONTINUED | OUTPATIENT
Start: 2021-10-12 | End: 2021-10-12

## 2021-10-12 RX ORDER — INSULIN GLARGINE 100 [IU]/ML
12 INJECTION, SOLUTION SUBCUTANEOUS AT BEDTIME
Refills: 0 | Status: DISCONTINUED | OUTPATIENT
Start: 2021-10-12 | End: 2021-10-16

## 2021-10-12 RX ORDER — AMLODIPINE BESYLATE 2.5 MG/1
10 TABLET ORAL EVERY 24 HOURS
Refills: 0 | Status: DISCONTINUED | OUTPATIENT
Start: 2021-10-13 | End: 2021-10-21

## 2021-10-12 RX ORDER — SODIUM CHLORIDE 9 MG/ML
1 INJECTION INTRAMUSCULAR; INTRAVENOUS; SUBCUTANEOUS DAILY
Refills: 0 | Status: DISCONTINUED | OUTPATIENT
Start: 2021-10-12 | End: 2021-10-12

## 2021-10-12 RX ORDER — LABETALOL HCL 100 MG
10 TABLET ORAL ONCE
Refills: 0 | Status: DISCONTINUED | OUTPATIENT
Start: 2021-10-12 | End: 2021-10-12

## 2021-10-12 RX ORDER — SODIUM CHLORIDE 9 MG/ML
1 INJECTION INTRAMUSCULAR; INTRAVENOUS; SUBCUTANEOUS THREE TIMES A DAY
Refills: 0 | Status: DISCONTINUED | OUTPATIENT
Start: 2021-10-12 | End: 2021-10-12

## 2021-10-12 RX ORDER — INSULIN LISPRO 100/ML
5 VIAL (ML) SUBCUTANEOUS
Refills: 0 | Status: DISCONTINUED | OUTPATIENT
Start: 2021-10-12 | End: 2021-10-15

## 2021-10-12 RX ADMIN — Medication 100 MILLIGRAM(S): at 20:50

## 2021-10-12 RX ADMIN — Medication 2: at 17:21

## 2021-10-12 RX ADMIN — Medication 200 MILLIGRAM(S): at 17:14

## 2021-10-12 RX ADMIN — HEPARIN SODIUM 5000 UNIT(S): 5000 INJECTION INTRAVENOUS; SUBCUTANEOUS at 20:53

## 2021-10-12 RX ADMIN — Medication 3 UNIT(S): at 17:21

## 2021-10-12 RX ADMIN — Medication 3 UNIT(S): at 12:05

## 2021-10-12 RX ADMIN — SODIUM CHLORIDE 1 GRAM(S): 9 INJECTION INTRAMUSCULAR; INTRAVENOUS; SUBCUTANEOUS at 17:14

## 2021-10-12 RX ADMIN — Medication 6 MILLIGRAM(S): at 22:23

## 2021-10-12 RX ADMIN — HEPARIN SODIUM 5000 UNIT(S): 5000 INJECTION INTRAVENOUS; SUBCUTANEOUS at 13:56

## 2021-10-12 RX ADMIN — HEPARIN SODIUM 5000 UNIT(S): 5000 INJECTION INTRAVENOUS; SUBCUTANEOUS at 04:45

## 2021-10-12 RX ADMIN — SIMVASTATIN 20 MILLIGRAM(S): 20 TABLET, FILM COATED ORAL at 20:53

## 2021-10-12 RX ADMIN — Medication 3: at 12:05

## 2021-10-12 RX ADMIN — PANTOPRAZOLE SODIUM 40 MILLIGRAM(S): 20 TABLET, DELAYED RELEASE ORAL at 04:46

## 2021-10-12 RX ADMIN — AMLODIPINE BESYLATE 5 MILLIGRAM(S): 2.5 TABLET ORAL at 01:27

## 2021-10-12 RX ADMIN — INSULIN GLARGINE 12 UNIT(S): 100 INJECTION, SOLUTION SUBCUTANEOUS at 22:23

## 2021-10-12 RX ADMIN — Medication 4: at 07:04

## 2021-10-12 RX ADMIN — AMLODIPINE BESYLATE 5 MILLIGRAM(S): 2.5 TABLET ORAL at 10:08

## 2021-10-12 RX ADMIN — Medication 200 MILLIGRAM(S): at 04:45

## 2021-10-12 RX ADMIN — Medication 200 MILLIGRAM(S): at 12:15

## 2021-10-12 NOTE — PROGRESS NOTE ADULT - PROBLEM SELECTOR PLAN 4
Likely 2/2 decreased PO intake/dehydration  - Na stable at approx 130   - encourage PO intake  - monitor BMP Cr 4.58 at presentation, pt reports that she has one enlarged kidney (L>R on renal US) but is not aware of any kidney disease diagnosis. Also voided >1L on 10/10 and found to be retaining >300ccs requiring straight cath. Cr downtrending, improved to 2.85 today (10/12). Renal US on 10/10 c/w renal disease  - BUN/Cr 51/4.58  - FeNa 1.2 consistent w/ intrarenal etiology, likely 2/2 ATN but also possible component of obstruction given urinary retention  - q4h bladder scan to monitor for urinary retention   - Avoid nephrotoxic meds, renally dose medications   - continue to trend BUN/Cr  - Urine output improving approx 60cc/hr, monitor for post-ATN diuresis  - strict I/Os  - obtain collateral from PCP (Dr. Knight [phone]) re: renal disease Cr 4.58 at presentation, pt reports that she has one enlarged kidney (L>R on renal US) but is not aware of any kidney disease diagnosis. Also voided >1L on 10/10 and found to be retaining >300ccs requiring straight cath. Cr downtrending, improved to 2.85 today (10/12). Renal US on 10/10 c/w renal disease  - BUN/Cr 51/4.58  - FeNa 1.2 consistent w/ intrarenal etiology, likely 2/2 ATN but also possible component of obstruction given urinary retention  - Avoid nephrotoxic meds, renally dose medications   - continue to trend BUN/Cr  - strict I/Os (has primafit), monitor for post-ATN diuresis   - obtain collateral from PCP (Dr. Knight [phone]) re: renal disease Cr 4.58 at presentation, pt reports that she has one enlarged kidney (L>R on renal US) but is not aware of any kidney disease diagnosis. Also voided >1L on 10/10 and found to be retaining >300ccs requiring straight cath. Cr downtrending, improved to 2.85 today (10/12). Renal US from 10/10 c/w renal disease  - BUN/Cr 51/4.58  - FeNa 1.2 consistent w/ intrarenal etiology, likely 2/2 ATN but also possible component of obstruction given urinary retention  - Avoid nephrotoxic meds, renally dose medications   - continue to trend BUN/Cr  - strict I/Os (has primafit), monitor for post-ATN diuresis   - f/u bladder scan at bedtime  - obtain collateral from PCP (Dr. Knight [phone]) re: renal disease

## 2021-10-12 NOTE — PROGRESS NOTE ADULT - SUBJECTIVE AND OBJECTIVE BOX
**INCOMPLETE NOTE    INTERVAL HPI/OVERNIGHT EVENTS:    OVERNIGHT: No overnight events.  SUBJECTIVE: Patient seen and examined at bedside.     ROS:  CV: Denies chest pain  Resp: Denies SOB  GI: Denies abdominal pain, constipation, diarrhea, nausea, vomiting  : Denies dysuria  ID: Denies fevers, chills  MSK: Denies joint pain     OBJECTIVE:    VITAL SIGNS:  ICU Vital Signs Last 24 Hrs  T(C): 36.6 (12 Oct 2021 05:31), Max: 36.9 (11 Oct 2021 18:10)  T(F): 97.9 (12 Oct 2021 05:31), Max: 98.4 (11 Oct 2021 18:10)  HR: 72 (12 Oct 2021 04:46) (72 - 109)  BP: 130/88 (12 Oct 2021 04:46) (130/88 - 171/82)  BP(mean): 103 (12 Oct 2021 04:46) (95 - 118)  ABP: --  ABP(mean): --  RR: 20 (12 Oct 2021 04:46) (18 - 22)  SpO2: 92% (12 Oct 2021 04:46) (84% - 92%)        10-10 @ 07:01  -  10-11 @ 07:00  --------------------------------------------------------  IN: 657 mL / OUT: 1900 mL / NET: -1243 mL    10-11 @ 07:01  -  10-12 @ 06:49  --------------------------------------------------------  IN: 472 mL / OUT: 1500 mL / NET: -1028 mL      CAPILLARY BLOOD GLUCOSE      POCT Blood Glucose.: 273 mg/dL (11 Oct 2021 21:23)      PHYSICAL EXAM:  General: NAD, comfortable  HEENT: NCAT, PERRL, clear conjunctiva, no scleral icterus  Neck: supple, no JVD  Respiratory: CTA b/l, no wheezing, rhonchi, rales  Cardiovascular: RRR, normal S1S2, no M/R/G  Vascular: 2+ radial and DP pulses  Abdomen: soft, NT/ND, bowel sounds in all four quadrants, no palpable masses  Extremities: WWP, no clubbing, cyanosis, or edema  Skin: No rashes present  Neuro:     MEDICATIONS:  MEDICATIONS  (STANDING):  dexAMETHasone  Injectable 6 milliGRAM(s) IV Push every 24 hours  dextrose 40% Gel 15 Gram(s) Oral once  dextrose 5%. 1000 milliLiter(s) (50 mL/Hr) IV Continuous <Continuous>  dextrose 5%. 1000 milliLiter(s) (100 mL/Hr) IV Continuous <Continuous>  dextrose 50% Injectable 25 Gram(s) IV Push once  dextrose 50% Injectable 12.5 Gram(s) IV Push once  dextrose 50% Injectable 25 Gram(s) IV Push once  glucagon  Injectable 1 milliGRAM(s) IntraMuscular once  guaiFENesin Oral Liquid (Sugar-Free) 200 milliGRAM(s) Oral every 6 hours  heparin   Injectable 5000 Unit(s) SubCutaneous every 8 hours  insulin lispro (ADMELOG) corrective regimen sliding scale   SubCutaneous Before meals and at bedtime  pantoprazole    Tablet 40 milliGRAM(s) Oral before breakfast  simvastatin 20 milliGRAM(s) Oral at bedtime    MEDICATIONS  (PRN):  acetaminophen   Tablet .. 650 milliGRAM(s) Oral every 6 hours PRN Temp greater or equal to 38C (100.4F), Mild Pain (1 - 3)  benzocaine 15 mG/menthol 3.6 mG Lozenge 1 Lozenge Oral every 4 hours PRN Sore Throat      ALLERGIES:  Allergies    Allergy Status Unknown    Intolerances        LABS:                        14.1   8.78  )-----------( 243      ( 11 Oct 2021 08:28 )             43.5     10-11    133<L>  |  98  |  52<H>  ----------------------------<  283<H>  4.3   |  20<L>  |  3.59<H>    Ca    8.3<L>      11 Oct 2021 08:28  Phos  3.8     10-11  Mg     2.3     10-11            RADIOLOGY & ADDITIONAL TESTS: Reviewed. Hospital Course     86 YO F with PMHx T2DM, HLD, HTN presented to [facility] ED on 10/9 with 1w Hx of generalized weakness, fatigue, SOB, decreased appetite, and cough in the setting of positive COVID PCR as outpatient. Patient received both doses of the Pfizer COVID vaccine in Jan/Feb. Also found to have JUDITH 2/2 ATN in the setting of poor PO intake. Started on 10 day course of dexamethasone. Kidney function continues to improve w/ PO intake.     INTERVAL HPI/OVERNIGHT EVENTS:    OVERNIGHT: No overnight events.  SUBJECTIVE: Patient seen and examined at bedside.     ROS:  CV: Denies chest pain  Resp: Denies SOB  GI: Denies abdominal pain, constipation, diarrhea, nausea, vomiting  : Denies dysuria  ID: Denies fevers, chills  MSK: Denies joint pain     OBJECTIVE:    VITAL SIGNS:  ICU Vital Signs Last 24 Hrs  T(C): 36.6 (12 Oct 2021 05:31), Max: 36.9 (11 Oct 2021 18:10)  T(F): 97.9 (12 Oct 2021 05:31), Max: 98.4 (11 Oct 2021 18:10)  HR: 72 (12 Oct 2021 04:46) (72 - 109)  BP: 130/88 (12 Oct 2021 04:46) (130/88 - 171/82)  BP(mean): 103 (12 Oct 2021 04:46) (95 - 118)  ABP: --  ABP(mean): --  RR: 20 (12 Oct 2021 04:46) (18 - 22)  SpO2: 92% (12 Oct 2021 04:46) (84% - 92%)        10-10 @ 07:01  -  10-11 @ 07:00  --------------------------------------------------------  IN: 657 mL / OUT: 1900 mL / NET: -1243 mL    10-11 @ 07:01  -  10-12 @ 06:49  --------------------------------------------------------  IN: 472 mL / OUT: 1500 mL / NET: -1028 mL      CAPILLARY BLOOD GLUCOSE      POCT Blood Glucose.: 273 mg/dL (11 Oct 2021 21:23)      PHYSICAL EXAM:  General: NAD, comfortable  HEENT: NCAT, PERRL, clear conjunctiva, no scleral icterus  Neck: supple, no JVD  Respiratory: CTA b/l, no wheezing, rhonchi, rales  Cardiovascular: RRR, normal S1S2, no M/R/G  Vascular: 2+ radial and DP pulses  Abdomen: soft, NT/ND, bowel sounds in all four quadrants, no palpable masses  Extremities: WWP, no clubbing, cyanosis, or edema  Skin: No rashes present  Neuro:     MEDICATIONS:  MEDICATIONS  (STANDING):  dexAMETHasone  Injectable 6 milliGRAM(s) IV Push every 24 hours  dextrose 40% Gel 15 Gram(s) Oral once  dextrose 5%. 1000 milliLiter(s) (50 mL/Hr) IV Continuous <Continuous>  dextrose 5%. 1000 milliLiter(s) (100 mL/Hr) IV Continuous <Continuous>  dextrose 50% Injectable 25 Gram(s) IV Push once  dextrose 50% Injectable 12.5 Gram(s) IV Push once  dextrose 50% Injectable 25 Gram(s) IV Push once  glucagon  Injectable 1 milliGRAM(s) IntraMuscular once  guaiFENesin Oral Liquid (Sugar-Free) 200 milliGRAM(s) Oral every 6 hours  heparin   Injectable 5000 Unit(s) SubCutaneous every 8 hours  insulin lispro (ADMELOG) corrective regimen sliding scale   SubCutaneous Before meals and at bedtime  pantoprazole    Tablet 40 milliGRAM(s) Oral before breakfast  simvastatin 20 milliGRAM(s) Oral at bedtime    MEDICATIONS  (PRN):  acetaminophen   Tablet .. 650 milliGRAM(s) Oral every 6 hours PRN Temp greater or equal to 38C (100.4F), Mild Pain (1 - 3)  benzocaine 15 mG/menthol 3.6 mG Lozenge 1 Lozenge Oral every 4 hours PRN Sore Throat      ALLERGIES:  Allergies    Allergy Status Unknown    Intolerances        LABS:                        14.1   8.78  )-----------( 243      ( 11 Oct 2021 08:28 )             43.5     10-11    133<L>  |  98  |  52<H>  ----------------------------<  283<H>  4.3   |  20<L>  |  3.59<H>    Ca    8.3<L>      11 Oct 2021 08:28  Phos  3.8     10-11  Mg     2.3     10-11            RADIOLOGY & ADDITIONAL TESTS: Reviewed.

## 2021-10-12 NOTE — PROGRESS NOTE ADULT - ASSESSMENT
86 yo F with PMHx T2DM, HLD, HTN presented to [facility] ED on 10/9 with 1w Hx of generalized weakness, fatigue, SOB, and cough in the setting of positive COVID PCR as outpatient. Patient admitted to [facility] for tele monitoring for AHRF i/s/o sepsis 2/2 COVID PNA.      NEURO  No active issues.     CARDIOVASCULAR  #HTN    Pt home meds include amlodipine 10 mg qd, metoprolol ER succinate 100mg qd and nifedipine 60mg ED qd.   - Holding home meds for now    #HLD  Pt home meds include simvastatin 20 mg qhs.  - Holding home meds for now, pt currently normotensive     #Troponemia    RESOLVED  trop 0.02 on arrival, no past cardiac medical history except for HTN/HLD. EKG was NSR, 77 bpm, TWI in III.  -Trop 0.01 repeat labs (10/1)       PULMONARY    #Acute hypoxemic respiratory failure i/s/o Sepsis 2/2 COVID PNA  - started Dexamethasone 6 mg 10 day course   - Remdesivir held due to impaired renal function   - Saturating 90%-95% on 6L NC     Plan:   - c/w Dexamethasone 6 mg 10 day course (10/10 - 10/19)  - wean O2 as tolerated       RENAL    #Acute kidney injury, possibly in the setting of decrease PO intake/dehydration  - BUN/Cr  51/4.58    Plan:   - FeNa 1.2 consistent w/ intrarenal etiology, likely 2/2 ATN   - q/4 bladder scan to monitor for urinary retention   - Avoid nephrotoxic meds, renally dose medications   - continue to trend BUN/Cr  - Urine output improving approx 60cc/hr     Plan:   - ATN improving, Cr downtrending  - Continue to monitor    #Hyponatremia i/s/o decreased PO intake/dehydration  - Na stable at approx. 130    Plan:   - encourage PO intake       ENDO  #T2DM  - Pt home meds include glimepiride 2mg BID and metformin 1g qd.  - A1c 7.1     Plan:  - ISS  - consistent carb diet     GI  No active issues    ID    #Sepsis 2/2 COVID PNA  - Treatment as above (AHRF i/s/o sepsis 2/2 COVID PNA)    HEME/ONC  No active issues      PROPHYLACTIC MEASURES  F: None  E: Replete PRN  D: DASH/TLC, Consistent Carb   DVT PPx: Heparin SubQ  GI PPx: protonix 40mg PO qd   CODE: FULL  Dispo: [facility]       86 yo F with PMHx T2DM, HLD, HTN presented to [facility] ED on 10/9 with 1w Hx of generalized weakness, fatigue, SOB, and cough in the setting of positive COVID PCR as outpatient. Patient admitted to [facility] for tele monitoring for AHRF i/s/o sepsis 2/2 COVID PNA.      NEURO  No active issues.     CARDIOVASCULAR  #HTN    Pt home meds include amlodipine 10 mg qd, metoprolol ER succinate 100mg qd and nifedipine 60mg ED qd.   - Holding home meds for now    #HLD  Pt home meds include simvastatin 20 mg qhs.  - Resume home amlodipine and Toprol     #Troponemia    RESOLVED  trop 0.02 on arrival, no past cardiac medical history except for HTN/HLD. EKG was NSR, 77 bpm, TWI in III.  -Trop 0.01 repeat labs (10/1)       PULMONARY    #Acute hypoxemic respiratory failure i/s/o Sepsis 2/2 COVID PNA  - started Dexamethasone 6 mg 10 day course   - Remdesivir held due to impaired renal function   - Saturating 90%-95% on 6L NC     Plan:   - c/w Dexamethasone 6 mg 10 day course (10/10 - 10/19)  - wean O2 as tolerated       RENAL    #Acute kidney injury, possibly in the setting of decrease PO intake/dehydration  - BUN/Cr  51/4.58  - FeNa 1.2 consistent w/ intrarenal etiology, likely 2/2 ATN   - Urine output approx 60cc/hr, consistent w/ recovery phase     Plan:      - Avoid nephrotoxic meds, renally dose medications   - continue to trend BUN/Cr      #Hyponatremia i/s/o decreased PO intake/dehydration  - Na stable at approx. 130  - Likely in the setting of increased urine output     Plan:   - encourage PO intake   - Salt tabs 1g TID       ENDO  #T2DM  - Pt home meds include glimepiride 2mg BID and metformin 1g qd.  - A1c 7.1   - hyperglycemia in the setting of steroid use    Plan:  - Insulin adjusted: 12 Lantus, 5 premeal  - consistent carb diet     GI  No active issues    ID    #Sepsis 2/2 COVID PNA  - Treatment as above (AHRF i/s/o sepsis 2/2 COVID PNA)    HEME/ONC  No active issues      PROPHYLACTIC MEASURES  F: None  E: Replete PRN  D: DASH/TLC, Consistent Carb   DVT PPx: Heparin SubQ  GI PPx: protonix 40mg PO qd   CODE: FULL  Dispo: [facility]

## 2021-10-12 NOTE — PROGRESS NOTE ADULT - PROBLEM SELECTOR PROBLEM 5
Diabetes Hyponatremia Melolabial Interpolation Flap Text: A decision was made to reconstruct the defect utilizing an interpolation axial flap and a staged reconstruction.  A telfa template was made of the defect.  This telfa template was then used to outline the melolabial interpolation flap.  The donor area for the pedicle flap was then injected with anesthesia.  The flap was excised through the skin and subcutaneous tissue down to the layer of the underlying musculature.  The pedicle flap was carefully excised within this deep plane to maintain its blood supply.  The edges of the donor site were undermined.   The donor site was closed in a primary fashion.  The pedicle was then rotated into position and sutured.  Once the tube was sutured into place, adequate blood supply was confirmed with blanching and refill.  The pedicle was then wrapped with xeroform gauze and dressed appropriately with a telfa and gauze bandage to ensure continued blood supply and protect the attached pedicle.

## 2021-10-12 NOTE — PROGRESS NOTE ADULT - PROBLEM SELECTOR PLAN 8
RESOLVED  trop 0.02 on admission, no past cardiac medical history except for HTN/HLD. EKG was NSR, 77 bpm, TWI in III.  - Trop 0.01 repeat labs (10/1) Pt home meds include simvastatin 20 mg PO at bedtime   - c/w home simvastatin

## 2021-10-12 NOTE — PROGRESS NOTE ADULT - PROBLEM SELECTOR PLAN 7
Pt home meds include simvastatin 20 mg PO at bedtime   - c/w home simvastatin Pt home meds include amlodipine 10mg PO daily, metoprolol ER succinate 100mg PO daily and nifedipine 60mg ED daily  - c/w home amlodipine and metoprolol ER  - holding home nifedipine, as pt already on CCB, clarify indication Pt home meds include amlodipine 10mg PO daily, metoprolol ER succinate 100mg PO daily and nifedipine 60mg ED daily  - c/w home amlodipine and metoprolol ER  - holding home nifedipine, as pt already on CCB, clarify indication w/ PCP

## 2021-10-12 NOTE — PROGRESS NOTE ADULT - ATTENDING COMMENTS
Patient seen and examined at bedside. Agree with exam, a/p as above with the following addendum/edits:     85 year old COVID vaccinated F w/ PMH of HTN, HLD, DM p/w acute hypoxic respiratory failure 2/2 COVID PNA and ATN, initially admitted to 7L, now stepped down once weaned to 6L NC. After transfer to [facility] was having some desaturation to 86% on 6L so was placed back on NRB. During interview, she was speaking in full sentences, not using accessory muscles, and well appearing. Encouraged IS use, possible proning, and sitting up in bed. Will re-assess need for NRB and transition to 6L NC. CRP is downtrending. FS elevated so will increase insulin regimen. Unclear etiology for ATN, will need to obtain collateral about baseline Cr, can c/w bicarb tabs in the interim. PT.

## 2021-10-12 NOTE — PROGRESS NOTE ADULT - PROBLEM SELECTOR PLAN 3
Cr 4.58 at presentation, pt reports that she has one enlarged kidney but is not aware of any kidney disease diagnosis. Cr downtrending, improved to 2.85 today (10/12)   - BUN/Cr  51/4.58  - FeNa 1.2 consistent w/ intrarenal etiology, likely 2/2 ATN   - q4h bladder scan to monitor for urinary retention   - Avoid nephrotoxic meds, renally dose medications   - continue to trend BUN/Cr  - Urine output improving approx 60cc/hr, monitor for post-ATN diuresis  - strict I/Os Admission BCx (10/9) growing Staph hominis (1/4 bottles, aerobic), pending susceptibility testing. Likely skin contaminant, as patient afebrile and clinically improving.   - f/u surveillance BCx (10/11): NGTD x1d  - f/u BCx (10/9)  - continue to monitor s/s infection

## 2021-10-12 NOTE — PROGRESS NOTE ADULT - ASSESSMENT
85F PMHx T2DM (last A1c 7.1 10/10), HLD, HTN presented to [facility] ED with 1wk h/o generalized weakness, fatigue, SOB, and cough in the setting of positive COVID PCR as outpatient (10/7), found to have JUDITH and troponinemia, initially admitted to [facility] for tele monitoring for AHRF and sepsis 2/2 COVID PNA. Also found with BCx positive for Staph hominis from admission and urinary retention, now resolved. O2 requirements weaned from HFNC to 6LNC w/ SpO2 resting >87%, JUDITH improving, troponinemia resolved, and pt stepped down to [facility] on 10/12.

## 2021-10-12 NOTE — PROGRESS NOTE ADULT - PROBLEM SELECTOR PLAN 6
Pt home meds include amlodipine 10mg PO daily, metoprolol ER succinate 100mg PO daily and nifedipine 60mg ED daily  - c/w home amlodipine and metoprolol ER  - holding home nifedipine, as pt already on CCB, clarify indication Pt home meds include glimepiride 2mg BID and metformin 1g daily.  - A1c 7.1 (10/10)   - c/w lantus 6U, lispro 3U pre-meal   - ISS  - consistent carb diet Pt home meds include glimepiride 2mg BID and metformin 1g daily.  - A1c 7.1 (10/10)   - increase lantus 12U, lispro 5U pre-meal TID  - ISS  - consistent carb diet

## 2021-10-12 NOTE — PROGRESS NOTE ADULT - PROBLEM SELECTOR PLAN 9
F: None, pt eating and drinking  E: Replete PRN, maintain K>4, Mg>2  N: DASH/TLC, Consistent Carb     VTE PPx: Heparin SubQ  GI PPx: protonix 40mg PO qd     CODE: FULL CODE    Dispo: [facility]->F RESOLVED  trop 0.02 on admission, no past cardiac medical history except for HTN/HLD. EKG was NSR, 77 bpm, TWI in III.  - Trop 0.01 repeat labs (10/10)

## 2021-10-12 NOTE — PROGRESS NOTE ADULT - PROBLEM SELECTOR PLAN 10
F: None, pt eating and drinking  E: Replete PRN, maintain K>4, Mg>2  N: DASH/TLC, Consistent Carb     VTE PPx: Heparin SubQ  GI PPx: protonix 40mg PO qd     CODE: FULL CODE    Dispo: [facility]F F: None, pt eating and drinking  E: Replete PRN, maintain K>4, Mg>2, caution repletion i/s/o JUDITH  N: DASH/TLC, Consistent Carb     VTE PPx: Heparin SubQ  GI PPx: protonix 40mg PO daily    CODE: FULL CODE    Dispo: [facility]->F

## 2021-10-12 NOTE — PROGRESS NOTE ADULT - PROBLEM SELECTOR PLAN 5
Pt home meds include glimepiride 2mg BID and metformin 1g daily.  - A1c 7.1 (10/10)   - c/w lantus 6U, lispro 3U pre-meal   - ISS  - consistent carb diet RESOLVING     Likely 2/2 decreased PO intake/dehydration  - Na stable at approx 130 while on [facility], 136 morning of transfer to [facility] (10/12)   - encourage PO intake  - monitor BMP RESOLVING     Likely 2/2 decreased PO intake/dehydration  - Na stable at approx 130 while on [facility], 136 morning of transfer to [facility] (10/12) s/p salt tabs TID   - encourage PO intake  - monitor BMP off salt tabs  - consider sodium bicarb 650 mg TID given hypocarbia on labs (likely 2/2 renal dysfunction)

## 2021-10-12 NOTE — PROGRESS NOTE ADULT - SUBJECTIVE AND OBJECTIVE BOX
INTERVAL HPI/OVERNIGHT EVENTS: Patient transferred from [facility]->[facility]    84 YO F with PMHx T2DM, HLD, HTN presented to [facility] ED on 10/9 with 1w Hx of generalized weakness, fatigue, SOB, decreased appetite, and cough in the setting of positive COVID PCR as outpatient, found to be tachycardic to 140->77 and hypoxic to 80% on RA w/ improvement to 82% on 6LNC. Admission labs also notable for BUN/Cr 51/4.58 (pt without known kidney disease), procal 1.32, and trop 0.02. Pt was initially admitted to [facility] for tele monitoring for AHRF and sepsis 2/2 COVID PNA. Per covid protocol, pt was started on decadron (10/10-), remdesivir held given JUDITH. Pt     UPON TRANSFER:   Patient was seen and examined at bedside.   No o/n events, patient sitting up comfortably in bed. Pt is feeling better, breathing well on 6LNC. Still with cough at night. Wants to remain in private room and hopes to be in new room by 8PM so she can watch [employer]. Pt confirms that she first became symptomatic 7-8 days ago, was seen by PCP on Thurs 10/7 when she was started on a Z-pack, then notified of positive covid test on 10/8. Son was also symptomatic day before she became symptomatic, was coughing much more and had GI symptoms. Pt thinks covid exposure was at [facility] place near son's home in [place]. Pt is fully vaccinated with Pfizer, dose 2 in Feb, got flu shot recently as well. Main complaints are tiredness and decreased appetite compared to baseline. Trying to eat and drink as much as usual. Was last on HFNC yesterday breathing is improving.  Patient denies: fever, chills, dizziness, HA, changes in vision, CP, palpitations, N/V/D/C, dysuria, changes in bowel movements, LE edema. ROS otherwise negative.        VITAL SIGNS:  T(F): 98.2 (10-12-21 @ 12:40)  HR: 93 (10-12-21 @ 12:19)  BP: 164/79 (10-12-21 @ 12:19)  RR: 18 (10-12-21 @ 12:19)  SpO2: 94% (10-12-21 @ 12:19)  Wt(kg): --    PHYSICAL EXAM:    Constitutional: pleasant obese elderly female sitting up in a chair in NAD, NC in place, speaking in full sentences, talkative  HEENT: PERRL, EOMI, sclera anicteric, neck supple, no masses, no JVD, MMM, good dentition  Respiratory: on 6LNC, SpO2 87-88% when talking, mild crackles at bases, no wheezing, no rhonchi, no rales; without accessory muscle use, no intercostal retractions  Cardiovascular: RRR, normal S1, S2; no M/R/G  Gastrointestinal: obese, exam limited by obesity; soft, NT; no masses palpable; BS normoactive x4 quadrants  : primafit draining pale yellow urine  Extremities: Warm and well perfused; 2+ pulses equal bilateral upper and lower extremities; no edema, cyanosis, or clubbing  Neurological: AOx3, CN II-XII grossly intact, no focal deficits   Skin: dark orange hue on b/l forarms, chest, upper back, lower legs, and outer face->pt clarified this is from artifical mirlande    MEDICATIONS  (STANDING):  dexAMETHasone  Injectable 6 milliGRAM(s) IV Push every 24 hours  dextrose 40% Gel 15 Gram(s) Oral once  dextrose 5%. 1000 milliLiter(s) (50 mL/Hr) IV Continuous <Continuous>  dextrose 5%. 1000 milliLiter(s) (100 mL/Hr) IV Continuous <Continuous>  dextrose 50% Injectable 25 Gram(s) IV Push once  dextrose 50% Injectable 12.5 Gram(s) IV Push once  dextrose 50% Injectable 25 Gram(s) IV Push once  glucagon  Injectable 1 milliGRAM(s) IntraMuscular once  guaiFENesin Oral Liquid (Sugar-Free) 200 milliGRAM(s) Oral every 6 hours  heparin   Injectable 5000 Unit(s) SubCutaneous every 8 hours  insulin glargine Injectable (LANTUS) 6 Unit(s) SubCutaneous at bedtime  insulin lispro (ADMELOG) corrective regimen sliding scale   SubCutaneous Before meals and at bedtime  insulin lispro Injectable (ADMELOG) 3 Unit(s) SubCutaneous three times a day before meals  metoprolol succinate  milliGRAM(s) Oral every 24 hours  pantoprazole    Tablet 40 milliGRAM(s) Oral before breakfast  simvastatin 20 milliGRAM(s) Oral at bedtime  sodium chloride 1 Gram(s) Oral three times a day    MEDICATIONS  (PRN):  acetaminophen   Tablet .. 650 milliGRAM(s) Oral every 6 hours PRN Temp greater or equal to 38C (100.4F), Mild Pain (1 - 3)  benzocaine 15 mG/menthol 3.6 mG Lozenge 1 Lozenge Oral every 4 hours PRN Sore Throat      Allergies    Allergy Status Unknown    Intolerances        LABS:                        13.5   7.10  )-----------( 261      ( 12 Oct 2021 15:46 )             43.6     10-12    131<L>  |  104  |  48<H>  ----------------------------<  264<H>  See note   |  14<L>  |  2.72<H>    Ca    8.7      12 Oct 2021 08:40  Phos  2.9     10-12  Mg     2.3     10-12        Urinalysis Basic - ( 12 Oct 2021 14:18 )    Color: Yellow / Appearance: Clear / S.020 / pH: x  Gluc: x / Ketone: NEGATIVE  / Bili: Negative / Urobili: 0.2 E.U./dL   Blood: x / Protein: 100 mg/dL / Nitrite: NEGATIVE   Leuk Esterase: Small / RBC: < 5 /HPF / WBC > 10 /HPF   Sq Epi: x / Non Sq Epi: 0-5 /HPF / Bacteria: Many /HPF      CAPILLARY BLOOD GLUCOSE      POCT Blood Glucose.: 274 mg/dL (12 Oct 2021 11:51)  POCT Blood Glucose.: 324 mg/dL (12 Oct 2021 06:58)  POCT Blood Glucose.: 273 mg/dL (11 Oct 2021 21:23)  POCT Blood Glucose.: 206 mg/dL (11 Oct 2021 17:14)    MICROBIOLOGY    Culture - Blood (collected 11 Oct 2021 08:33)  Source: .Blood Blood  Preliminary Report (12 Oct 2021 09:01):    No growth at 1 day.    Culture - Blood (collected 09 Oct 2021 20:55)  Source: .Blood Blood-Peripheral  Gram Stain (10 Oct 2021 22:24):    Aerobic Bottle: Gram Positive Cocci in Clusters    Result called to and read back by_ EL Schulte RN ([id number])  10/10/2021    22:24:07    ***Blood Panel PCR results on this specimen are available    approximately 3 hours after the Gram stain result.***    Gram stain, PCR, and/or culture results may not always    correspond due to difference in methodologies.    ************************************************************    This PCR assay was performed using [employer].    The following targets are tested for:Enterococcus,    vancomycin resistant enterococci, Listeria monocytogenes,    coagulase negative staphylococci, S. aureus,    methicillin resistant S. aureus, Streptococcus agalactiae    (Group B), S. pneumoniae, S. pyogenes (Group A),    Acinetobacter baumannii, Enterobacter cloacae, E. coli,    Klebsiella oxytoca, K. pneumoniae, Proteus sp.,    Serratia marcescens, Haemophilus influenzae,    Neisseria meningitidis, Pseudomonas aeruginosa, Candida    albicans, C. glabrata, C krusei, C parapsilosis,    C. tropicalisand the KPC resistance gene.  Preliminary Report (12 Oct 2021 13:50):    Growth in aerobic bottle: Staphylococcus hominis    Susceptibility to follow.    Anaerobic Bottle: No growth to date.    Change in culture status will be immediately reported.  Organism: Blood Culture PCR (11 Oct 2021 00:01)  Organism: Blood Culture PCR (11 Oct 2021 00:01)    Culture - Blood (collected 09 Oct 2021 20:55)  Source: .Blood Blood-Peripheral  Preliminary Report (11 Oct 2021 21:00):    No growth at 2 days.    RADIOLOGY:    XAM:  US RETROPERITONEAL LIMITED                        PROCEDURE DATE:  10/10/2021    INTERPRETATION:  CLINICAL HISTORY: 85-year-old with acute renal failure.  FINDINGS: Limited ultrasound of the retroperitoneum was performed.    The right kidney measures 9.1 cm and is of appropriate echogenicity with a few cysts measuring up to 10 mm. Negative for hydronephrosis, mass or calculus.    The left kidney measures 10.7 cm and is of appropriate echogenicity without hydronephrosis, mass or calculus.    IMPRESSION:    Findings of medical renal disease.         HOSPITAL COURSE  85F PMHx T2DM, HLD, HTN presented to [facility] ED on 10/9 with 1wk h/o generalized weakness, fatigue, SOB, decreased appetite, and cough in the setting of positive COVID PCR as outpatient (10/7), found to be tachycardic to 140->77 and hypoxic to 80% on RA w/ improvement to 82% on 6LNC. Admission labs also notable for BUN/Cr 51/4.58 (pt without known kidney disease), procal 1.32, and trop 0.02. Pt was initially admitted to [facility] for tele monitoring for AHRF and sepsis 2/2 COVID PNA. Per covid protocol, pt was started on decadron (10/10-), remdesivir held given JUDITH. Pt was persistently hypoxic to 85-86 on 6LNC, so pt was placed on HFNC 50/50 w/ improvement to of SpO2 to 89-90%. Troponinemia resolved on recheck (10/10), likely 2/2 demand ischemia i/s/o AHRF and sepsis 2/2 covid. Admission BCx positive for 1/4 Staph hominis, surveillance BCx (10/11) NGTD. Pt initially found w/ urinary retention on bladder scans requiring straight cath on 10/10, no longer retaining >300 ccs on subsequent bladder scans, and JUDITH improved w/ Cr stable ~2.8. On 10/11, pt was weaned off HFNC and satting >87% on 6LNC. On 10/12, home BP meds were restarted as pt persistently hypertensive, and pt deemed stable for stepdown to [facility] on 10/1 for further management of COVID PNA.    INTERVAL HPI/OVERNIGHT EVENTS: Patient transferred from [facility]    UPON TRANSFER:   Patient was seen and examined at bedside.   No o/n events, patient sitting up comfortably in bed. Pt is feeling better, breathing well on 6LNC. Still with cough at night. Wants to remain in private room and hopes to be in new room by 8PM so she can watch [employer]. Pt confirms that she first became symptomatic 7-8 days ago, was seen by PCP on Thurs 10/7 when she was started on a Z-pack, then notified of positive covid test on 10/8. Son was also symptomatic day before she became symptomatic, was coughing much more and had GI symptoms. Pt thinks covid exposure was at [place] place near son's home in [place]. Pt is fully vaccinated with Pfizer, dose 2 in Feb, got flu shot recently as well. Main complaints are tiredness and decreased appetite compared to baseline. Trying to eat and drink as much as usual. Was last on HFNC yesterday breathing is improving.  Patient denies: fever, chills, dizziness, HA, changes in vision, CP, palpitations, N/V/D/C, dysuria, changes in bowel movements, LE edema. ROS otherwise negative.        VITAL SIGNS:  T(F): 98.2 (10-12-21 @ 12:40)  HR: 93 (10-12-21 @ 12:19)  BP: 164/79 (10-12-21 @ 12:19)  RR: 18 (10-12-21 @ 12:19)  SpO2: 94% (10-12-21 @ 12:19)  Wt(kg): --    PHYSICAL EXAM:    Constitutional: pleasant obese elderly female sitting up in a chair in NAD, NC in place, speaking in full sentences, talkative  HEENT: PERRL, EOMI, sclera anicteric, neck supple, no masses, no JVD, MMM, good dentition  Respiratory: on 6LNC, SpO2 87-88% when talking, mild crackles at bases, no wheezing, no rhonchi, no rales; without accessory muscle use, no intercostal retractions  Cardiovascular: RRR, normal S1, S2; no M/R/G  Gastrointestinal: obese, exam limited by obesity; soft, NT; no masses palpable; BS normoactive x4 quadrants  : primafit draining pale yellow urine  Extremities: Warm and well perfused; 2+ pulses equal bilateral upper and lower extremities; no edema, cyanosis, or clubbing  Neurological: AOx3, CN II-XII grossly intact, no focal deficits   Skin: dark orange hue on b/l forarms, chest, upper back, lower legs, and outer face->pt clarified this is from artifical mirlande    MEDICATIONS  (STANDING):  dexAMETHasone  Injectable 6 milliGRAM(s) IV Push every 24 hours  dextrose 40% Gel 15 Gram(s) Oral once  dextrose 5%. 1000 milliLiter(s) (50 mL/Hr) IV Continuous <Continuous>  dextrose 5%. 1000 milliLiter(s) (100 mL/Hr) IV Continuous <Continuous>  dextrose 50% Injectable 25 Gram(s) IV Push once  dextrose 50% Injectable 12.5 Gram(s) IV Push once  dextrose 50% Injectable 25 Gram(s) IV Push once  glucagon  Injectable 1 milliGRAM(s) IntraMuscular once  guaiFENesin Oral Liquid (Sugar-Free) 200 milliGRAM(s) Oral every 6 hours  heparin   Injectable 5000 Unit(s) SubCutaneous every 8 hours  insulin glargine Injectable (LANTUS) 6 Unit(s) SubCutaneous at bedtime  insulin lispro (ADMELOG) corrective regimen sliding scale   SubCutaneous Before meals and at bedtime  insulin lispro Injectable (ADMELOG) 3 Unit(s) SubCutaneous three times a day before meals  metoprolol succinate  milliGRAM(s) Oral every 24 hours  pantoprazole    Tablet 40 milliGRAM(s) Oral before breakfast  simvastatin 20 milliGRAM(s) Oral at bedtime  sodium chloride 1 Gram(s) Oral three times a day    MEDICATIONS  (PRN):  acetaminophen   Tablet .. 650 milliGRAM(s) Oral every 6 hours PRN Temp greater or equal to 38C (100.4F), Mild Pain (1 - 3)  benzocaine 15 mG/menthol 3.6 mG Lozenge 1 Lozenge Oral every 4 hours PRN Sore Throat      Allergies    Allergy Status Unknown    Intolerances        LABS:                        13.5   7.10  )-----------( 261      ( 12 Oct 2021 15:46 )             43.6     10-12    131<L>  |  104  |  48<H>  ----------------------------<  264<H>  See note   |  14<L>  |  2.72<H>    Ca    8.7      12 Oct 2021 08:40  Phos  2.9     10-12  Mg     2.3     10-12        Urinalysis Basic - ( 12 Oct 2021 14:18 )    Color: Yellow / Appearance: Clear / S.020 / pH: x  Gluc: x / Ketone: NEGATIVE  / Bili: Negative / Urobili: 0.2 E.U./dL   Blood: x / Protein: 100 mg/dL / Nitrite: NEGATIVE   Leuk Esterase: Small / RBC: < 5 /HPF / WBC > 10 /HPF   Sq Epi: x / Non Sq Epi: 0-5 /HPF / Bacteria: Many /HPF      CAPILLARY BLOOD GLUCOSE      POCT Blood Glucose.: 274 mg/dL (12 Oct 2021 11:51)  POCT Blood Glucose.: 324 mg/dL (12 Oct 2021 06:58)  POCT Blood Glucose.: 273 mg/dL (11 Oct 2021 21:23)  POCT Blood Glucose.: 206 mg/dL (11 Oct 2021 17:14)    MICROBIOLOGY    Culture - Blood (collected 11 Oct 2021 08:33)  Source: .Blood Blood  Preliminary Report (12 Oct 2021 09:01):    No growth at 1 day.    Culture - Blood (collected 09 Oct 2021 20:55)  Source: .Blood Blood-Peripheral  Gram Stain (10 Oct 2021 22:24):    Aerobic Bottle: Gram Positive Cocci in Clusters    Result called to and read back byEL Josue RN ([id number])  10/10/2021    22:24:07    ***Blood Panel PCR results on this specimen are available    approximately 3 hours after the Gram stain result.***    Gram stain, PCR, and/or culture results may not always    correspond due to difference in methodologies.    ************************************************************    This PCR assay was performed using [employer].    The following targets are tested for:Enterococcus,    vancomycin resistant enterococci, Listeria monocytogenes,    coagulase negative staphylococci, S. aureus,    methicillin resistant S. aureus, Streptococcus agalactiae    (Group B), S. pneumoniae, S. pyogenes (Group A),    Acinetobacter baumannii, Enterobacter cloacae, E. coli,    Klebsiella oxytoca, K. pneumoniae, Proteus sp.,    Serratia marcescens, Haemophilus influenzae,    Neisseria meningitidis, Pseudomonas aeruginosa, Candida    albicans, C. glabrata, C krusei, C parapsilosis,    C. tropicalisand the KPC resistance gene.  Preliminary Report (12 Oct 2021 13:50):    Growth in aerobic bottle: Staphylococcus hominis    Susceptibility to follow.    Anaerobic Bottle: No growth to date.    Change in culture status will be immediately reported.  Organism: Blood Culture PCR (11 Oct 2021 00:01)  Organism: Blood Culture PCR (11 Oct 2021 00:01)    Culture - Blood (collected 09 Oct 2021 20:55)  Source: .Blood Blood-Peripheral  Preliminary Report (11 Oct 2021 21:00):    No growth at 2 days.    RADIOLOGY:    XAM:  US RETROPERITONEAL LIMITED                        PROCEDURE DATE:  10/10/2021    INTERPRETATION:  CLINICAL HISTORY: 85-year-old with acute renal failure.  FINDINGS: Limited ultrasound of the retroperitoneum was performed.    The right kidney measures 9.1 cm and is of appropriate echogenicity with a few cysts measuring up to 10 mm. Negative for hydronephrosis, mass or calculus.    The left kidney measures 10.7 cm and is of appropriate echogenicity without hydronephrosis, mass or calculus.    IMPRESSION:    Findings of medical renal disease.         ******TRANSFER ACCEPTANCE NOTE FROM [facility] TO [facility]******    HOSPITAL COURSE:  85F PMHx T2DM, HLD, HTN presented to [facility] ED on 10/9 with 1wk h/o generalized weakness, fatigue, SOB, decreased appetite, and cough in the setting of positive COVID PCR as outpatient (10/7), found to be tachycardic to 140->77 and hypoxic to 80% on RA w/ improvement to 92% on 6LNC. Admission labs also notable for BUN/Cr 51/4.58 (pt without known kidney disease), procal 1.32, and trop 0.02. Pt was initially admitted to [facility] for tele monitoring for AHRF and sepsis 2/2 COVID PNA. Per covid protocol, pt was started on decadron (10/10-), remdesivir held given JUDITH. Pt was persistently hypoxic to 85-86 on 6LNC, so pt was placed on HFNC 50/50 w/ improvement to of SpO2 to 89-90%. Troponinemia resolved on recheck (10/10), likely 2/2 demand ischemia i/s/o AHRF and sepsis 2/2 covid. Admission BCx positive for 1/4 Staph hominis, surveillance BCx (10/11) NGTD. Pt initially found w/ urinary retention on bladder scans requiring straight cath on 10/10, no longer retaining >300 ccs on subsequent bladder scans, and JUDITH improved w/ Cr stable ~2.8. On 10/11, pt was weaned off HFNC and satting >87% on 6LNC. On 10/12, home BP meds were restarted as pt persistently hypertensive, and pt deemed stable for stepdown to [facility] on 10/1 for further management of COVID PNA.    INTERVAL HPI/OVERNIGHT EVENTS: Patient transferred from [facility]->[facility]    UPON TRANSFER:   Patient was seen and examined at bedside.   No o/n events, patient sitting up comfortably in bed. Pt is feeling better, breathing well on 6LNC. Still with cough at night. Wants to remain in private room and hopes to be in new room by 8PM so she can watch [employer]. Pt confirms that she first became symptomatic 7-8 days ago, was seen by PCP on Thurs 10/7 when she was started on a Z-pack, then notified of positive covid test on 10/8. Son was also symptomatic day before she became symptomatic, was coughing much more and had GI symptoms. Pt thinks covid exposure was at pizza place near son's home in [place]. Pt is fully vaccinated with Pfizer, dose 2 in Feb, got flu shot recently as well. Main complaints are tiredness and decreased appetite compared to baseline. Trying to eat and drink as much as usual. Was last on HFNC yesterday breathing is improving.  Patient denies: fever, chills, dizziness, HA, changes in vision, CP, palpitations, N/V/D/C, dysuria, changes in bowel movements, LE edema. ROS otherwise negative.        VITAL SIGNS:  T(F): 98.2 (10-12-21 @ 12:40)  HR: 93 (10-12-21 @ 12:19)  BP: 164/79 (10-12-21 @ 12:19)  RR: 18 (10-12-21 @ 12:19)  SpO2: 94% (10-12-21 @ :19)  Wt(kg): --    PHYSICAL EXAM:    Constitutional: pleasant obese elderly female sitting up in a chair in [facility], [place] in place, speaking in full sentences, talkative  HEENT: PERRL, EOMI, sclera anicteric, neck supple, no masses, no JVD, MMM, good dentition  Respiratory: on 6LNC, SpO2 87-88% when talking, mild crackles at bases, no wheezing, no rhonchi, no rales; without accessory muscle use, no intercostal retractions  Cardiovascular: RRR, normal S1, S2; no M/R/G  Gastrointestinal: obese, exam limited by obesity; soft, NT; no masses palpable; BS normoactive x4 quadrants  : primafit draining pale yellow urine  Extremities: Warm and well perfused; 2+ pulses equal bilateral upper and lower extremities; no edema, cyanosis, or clubbing  Neurological: AOx3, CN II-XII grossly intact, no focal deficits   Skin: dark orange hue on b/l forarms, chest, upper back, lower legs, and outer face->pt clarified this is from artifical mirlande    MEDICATIONS  (STANDING):  dexAMETHasone  Injectable 6 milliGRAM(s) IV Push every 24 hours  dextrose 40% Gel 15 Gram(s) Oral once  dextrose 5%. 1000 milliLiter(s) (50 mL/Hr) IV Continuous <Continuous>  dextrose 5%. 1000 milliLiter(s) (100 mL/Hr) IV Continuous <Continuous>  dextrose 50% Injectable 25 Gram(s) IV Push once  dextrose 50% Injectable 12.5 Gram(s) IV Push once  dextrose 50% Injectable 25 Gram(s) IV Push once  glucagon  Injectable 1 milliGRAM(s) IntraMuscular once  guaiFENesin Oral Liquid (Sugar-Free) 200 milliGRAM(s) Oral every 6 hours  heparin   Injectable 5000 Unit(s) SubCutaneous every 8 hours  insulin glargine Injectable (LANTUS) 6 Unit(s) SubCutaneous at bedtime  insulin lispro (ADMELOG) corrective regimen sliding scale   SubCutaneous Before meals and at bedtime  insulin lispro Injectable (ADMELOG) 3 Unit(s) SubCutaneous three times a day before meals  metoprolol succinate  milliGRAM(s) Oral every 24 hours  pantoprazole    Tablet 40 milliGRAM(s) Oral before breakfast  simvastatin 20 milliGRAM(s) Oral at bedtime  sodium chloride 1 Gram(s) Oral three times a day    MEDICATIONS  (PRN):  acetaminophen   Tablet .. 650 milliGRAM(s) Oral every 6 hours PRN Temp greater or equal to 38C (100.4F), Mild Pain (1 - 3)  benzocaine 15 mG/menthol 3.6 mG Lozenge 1 Lozenge Oral every 4 hours PRN Sore Throat      Allergies    Allergy Status Unknown    Intolerances        LABS:                        13.5   7.10  )-----------( 261      ( 12 Oct 2021 15:46 )             43.6     10-12    131<L>  |  104  |  48<H>  ----------------------------<  264<H>  See note   |  14<L>  |  2.72<H>    Ca    8.7      12 Oct 2021 08:40  Phos  2.9     10-12  Mg     2.3     10-12        Urinalysis Basic - ( 12 Oct 2021 14:18 )    Color: Yellow / Appearance: Clear / S.020 / pH: x  Gluc: x / Ketone: NEGATIVE  / Bili: Negative / Urobili: 0.2 E.U./dL   Blood: x / Protein: 100 mg/dL / Nitrite: NEGATIVE   Leuk Esterase: Small / RBC: < 5 /HPF / WBC > 10 /HPF   Sq Epi: x / Non Sq Epi: 0-5 /HPF / Bacteria: Many /HPF      CAPILLARY BLOOD GLUCOSE      POCT Blood Glucose.: 274 mg/dL (12 Oct 2021 11:51)  POCT Blood Glucose.: 324 mg/dL (12 Oct 2021 06:58)  POCT Blood Glucose.: 273 mg/dL (11 Oct 2021 21:23)  POCT Blood Glucose.: 206 mg/dL (11 Oct 2021 17:14)    MICROBIOLOGY    Culture - Blood (collected 11 Oct 2021 08:33)  Source: .Blood Blood  Preliminary Report (12 Oct 2021 09:01):    No growth at 1 day.    Culture - Blood (collected 09 Oct 2021 20:55)  Source: .Blood Blood-Peripheral  Gram Stain (10 Oct 2021 22:24):    Aerobic Bottle: Gram Positive Cocci in Clusters    Result called to and read back byEL Josue RN ([id number])  10/10/2021    22:24:07    ***Blood Panel PCR results on this specimen are available    approximately 3 hours after the Gram stain result.***    Gram stain, PCR, and/or culture results may not always    correspond due to difference in methodologies.    ************************************************************    This PCR assay was performed using [employer].    The following targets are tested for:Enterococcus,    vancomycin resistant enterococci, Listeria monocytogenes,    coagulase negative staphylococci, S. aureus,    methicillin resistant S. aureus, Streptococcus agalactiae    (Group B), S. pneumoniae, S. pyogenes (Group A),    Acinetobacter baumannii, Enterobacter cloacae, E. coli,    Klebsiella oxytoca, K. pneumoniae, Proteus sp.,    Serratia marcescens, Haemophilus influenzae,    Neisseria meningitidis, Pseudomonas aeruginosa, Candida    albicans, C. glabrata, C krusei, C parapsilosis,    C. tropicalisand the KPC resistance gene.  Preliminary Report (12 Oct 2021 13:50):    Growth in aerobic bottle: Staphylococcus hominis    Susceptibility to follow.    Anaerobic Bottle: No growth to date.    Change in culture status will be immediately reported.  Organism: Blood Culture PCR (11 Oct 2021 00:01)  Organism: Blood Culture PCR (11 Oct 2021 00:01)    Culture - Blood (collected 09 Oct 2021 20:55)  Source: .Blood Blood-Peripheral  Preliminary Report (11 Oct 2021 21:00):    No growth at 2 days.    RADIOLOGY:    XAM:  US RETROPERITONEAL LIMITED                        PROCEDURE DATE:  10/10/2021    INTERPRETATION:  CLINICAL HISTORY: 85-year-old with acute renal failure.  FINDINGS: Limited ultrasound of the retroperitoneum was performed.    The right kidney measures 9.1 cm and is of appropriate echogenicity with a few cysts measuring up to 10 mm. Negative for hydronephrosis, mass or calculus.    The left kidney measures 10.7 cm and is of appropriate echogenicity without hydronephrosis, mass or calculus.    IMPRESSION:    Findings of medical renal disease.

## 2021-10-12 NOTE — PROGRESS NOTE ADULT - ATTENDING COMMENTS
Comfortable on 6 L Nc for 24 hours. Resolving ATn and replace HcO3- with NaHCO3- tabs. Add insulin as outlined.

## 2021-10-12 NOTE — PROGRESS NOTE ADULT - PROBLEM SELECTOR PLAN 1
Symptoms started approx ~10/5, went to PCP on 10/7 and given zithromax and tested for covid, notified of positive test by PCP on 10/8. Developed SOB and became altered, admitted 10/9.  - c/w Dexamethasone 6 mg PO daily 10 day course (10/10 - 10/19)  - Remdesivir held due to impaired renal function   - Saturating 90%-95% on 6LNC, wean O2 as tolerated  - D-Dimer 348 (10/11)<-345 (10/9)   - Ferritin 332 (10/11)<- 283 (10/9)   - CRP 73 (10/11)<-111.4 (10/10)<-117.9 (10/9)  - ESR 65 (10/11)  - trend covid inflammatory markers q2d (last 10/11, next due 10/13) Symptoms started approx ~10/5, went to PCP on 10/7 and given zithromax and tested for covid, notified of positive test by PCP on 10/8. Developed SOB and became altered, admitted 10/9.  - c/w Dexamethasone 6 mg PO daily 10 day course (10/10 - 10/19)  - Remdesivir held due to impaired renal function   - Saturating 90%-95% on 6LNC, wean O2 as tolerated  - D-Dimer 348 (10/11)<-345 (10/9)   - Ferritin 332 (10/11)<-283 (10/9)   - CRP 73 (10/11)<-111.4 (10/10)<-117.9 (10/9)  - ESR 65 (10/11)  - trend covid inflammatory markers q2d (last 10/11, next due 10/13) Symptoms started approx ~10/5, went to PCP on 10/7 and given zithromax and tested for covid, notified of positive test by PCP on 10/8. Developed SOB and became altered, admitted 10/9.  - c/w Dexamethasone 6 mg PO daily 10 day course (10/10-10/19)  - Remdesivir held due to impaired renal function   - Saturating 90%-95% on 6LNC, wean O2 as tolerated  - encourage proning  - D-Dimer 348 (10/11)<-345 (10/9)   - Ferritin 332 (10/11)<-283 (10/9)   - CRP 73 (10/11)<-111.4 (10/10)<-117.9 (10/9)  - ESR 65 (10/11)  - trend covid inflammatory markers q2d (last 10/11, next due 10/13)

## 2021-10-13 LAB
-  CEFAZOLIN: SIGNIFICANT CHANGE UP
-  CLINDAMYCIN: SIGNIFICANT CHANGE UP
-  ERYTHROMYCIN: SIGNIFICANT CHANGE UP
-  LINEZOLID: SIGNIFICANT CHANGE UP
-  OXACILLIN: SIGNIFICANT CHANGE UP
-  RIFAMPIN: SIGNIFICANT CHANGE UP
-  TRIMETHOPRIM/SULFAMETHOXAZOLE: SIGNIFICANT CHANGE UP
-  VANCOMYCIN: SIGNIFICANT CHANGE UP
ALBUMIN SERPL ELPH-MCNC: 3.4 G/DL — SIGNIFICANT CHANGE UP (ref 3.3–5)
ALP SERPL-CCNC: 49 U/L — SIGNIFICANT CHANGE UP (ref 40–120)
ALT FLD-CCNC: 12 U/L — SIGNIFICANT CHANGE UP (ref 10–45)
ANION GAP SERPL CALC-SCNC: 13 MMOL/L — SIGNIFICANT CHANGE UP (ref 5–17)
AST SERPL-CCNC: 25 U/L — SIGNIFICANT CHANGE UP (ref 10–40)
BASE EXCESS BLDA CALC-SCNC: -2.8 MMOL/L — LOW (ref -2–3)
BASOPHILS # BLD AUTO: 0.04 K/UL — SIGNIFICANT CHANGE UP (ref 0–0.2)
BASOPHILS NFR BLD AUTO: 0.5 % — SIGNIFICANT CHANGE UP (ref 0–2)
BILIRUB SERPL-MCNC: 0.3 MG/DL — SIGNIFICANT CHANGE UP (ref 0.2–1.2)
BUN SERPL-MCNC: 48 MG/DL — HIGH (ref 7–23)
CALCIUM SERPL-MCNC: 8.5 MG/DL — SIGNIFICANT CHANGE UP (ref 8.4–10.5)
CHLORIDE SERPL-SCNC: 101 MMOL/L — SIGNIFICANT CHANGE UP (ref 96–108)
CO2 BLDA-SCNC: 23 MMOL/L — SIGNIFICANT CHANGE UP (ref 19–24)
CO2 SERPL-SCNC: 20 MMOL/L — LOW (ref 22–31)
CREAT SERPL-MCNC: 2.77 MG/DL — HIGH (ref 0.5–1.3)
CRP SERPL-MCNC: 15.7 MG/L — HIGH (ref 0–4)
EOSINOPHIL # BLD AUTO: 0 K/UL — SIGNIFICANT CHANGE UP (ref 0–0.5)
EOSINOPHIL NFR BLD AUTO: 0 % — SIGNIFICANT CHANGE UP (ref 0–6)
GLUCOSE BLDC GLUCOMTR-MCNC: 125 MG/DL — HIGH (ref 70–99)
GLUCOSE BLDC GLUCOMTR-MCNC: 136 MG/DL — HIGH (ref 70–99)
GLUCOSE BLDC GLUCOMTR-MCNC: 150 MG/DL — HIGH (ref 70–99)
GLUCOSE BLDC GLUCOMTR-MCNC: 230 MG/DL — HIGH (ref 70–99)
GLUCOSE SERPL-MCNC: 343 MG/DL — HIGH (ref 70–99)
HCO3 BLDA-SCNC: 22 MMOL/L — SIGNIFICANT CHANGE UP (ref 21–28)
HCT VFR BLD CALC: 40.3 % — SIGNIFICANT CHANGE UP (ref 34.5–45)
HGB BLD-MCNC: 13.1 G/DL — SIGNIFICANT CHANGE UP (ref 11.5–15.5)
IMM GRANULOCYTES NFR BLD AUTO: 4.5 % — HIGH (ref 0–1.5)
LYMPHOCYTES # BLD AUTO: 1.32 K/UL — SIGNIFICANT CHANGE UP (ref 1–3.3)
LYMPHOCYTES # BLD AUTO: 15.6 % — SIGNIFICANT CHANGE UP (ref 13–44)
MAGNESIUM SERPL-MCNC: 2.3 MG/DL — SIGNIFICANT CHANGE UP (ref 1.6–2.6)
MCHC RBC-ENTMCNC: 28.8 PG — SIGNIFICANT CHANGE UP (ref 27–34)
MCHC RBC-ENTMCNC: 32.5 GM/DL — SIGNIFICANT CHANGE UP (ref 32–36)
MCV RBC AUTO: 88.6 FL — SIGNIFICANT CHANGE UP (ref 80–100)
METHOD TYPE: SIGNIFICANT CHANGE UP
MONOCYTES # BLD AUTO: 0.71 K/UL — SIGNIFICANT CHANGE UP (ref 0–0.9)
MONOCYTES NFR BLD AUTO: 8.4 % — SIGNIFICANT CHANGE UP (ref 2–14)
NEUTROPHILS # BLD AUTO: 6.02 K/UL — SIGNIFICANT CHANGE UP (ref 1.8–7.4)
NEUTROPHILS NFR BLD AUTO: 71 % — SIGNIFICANT CHANGE UP (ref 43–77)
NRBC # BLD: 0 /100 WBCS — SIGNIFICANT CHANGE UP (ref 0–0)
NT-PROBNP SERPL-SCNC: 1100 PG/ML — HIGH (ref 0–300)
PCO2 BLDA: 37 MMHG — HIGH (ref 32–35)
PH BLDA: 7.38 — SIGNIFICANT CHANGE UP (ref 7.35–7.45)
PHOSPHATE SERPL-MCNC: 2.7 MG/DL — SIGNIFICANT CHANGE UP (ref 2.5–4.5)
PLATELET # BLD AUTO: 289 K/UL — SIGNIFICANT CHANGE UP (ref 150–400)
PO2 BLDA: 60 MMHG — LOW (ref 83–108)
POTASSIUM SERPL-MCNC: 5.1 MMOL/L — SIGNIFICANT CHANGE UP (ref 3.5–5.3)
POTASSIUM SERPL-SCNC: 5.1 MMOL/L — SIGNIFICANT CHANGE UP (ref 3.5–5.3)
PROCALCITONIN SERPL-MCNC: 0.42 NG/ML — HIGH (ref 0.02–0.1)
PROT SERPL-MCNC: 7 G/DL — SIGNIFICANT CHANGE UP (ref 6–8.3)
RBC # BLD: 4.55 M/UL — SIGNIFICANT CHANGE UP (ref 3.8–5.2)
RBC # FLD: 13.8 % — SIGNIFICANT CHANGE UP (ref 10.3–14.5)
SAO2 % BLDA: 92.3 % — LOW (ref 94–98)
SODIUM SERPL-SCNC: 134 MMOL/L — LOW (ref 135–145)
WBC # BLD: 8.47 K/UL — SIGNIFICANT CHANGE UP (ref 3.8–10.5)
WBC # FLD AUTO: 8.47 K/UL — SIGNIFICANT CHANGE UP (ref 3.8–10.5)

## 2021-10-13 PROCEDURE — 99233 SBSQ HOSP IP/OBS HIGH 50: CPT

## 2021-10-13 PROCEDURE — 71045 X-RAY EXAM CHEST 1 VIEW: CPT | Mod: 26

## 2021-10-13 PROCEDURE — 99291 CRITICAL CARE FIRST HOUR: CPT

## 2021-10-13 PROCEDURE — 36000 PLACE NEEDLE IN VEIN: CPT

## 2021-10-13 PROCEDURE — 76937 US GUIDE VASCULAR ACCESS: CPT | Mod: 26

## 2021-10-13 RX ORDER — VANCOMYCIN HCL 1 G
1250 VIAL (EA) INTRAVENOUS ONCE
Refills: 0 | Status: COMPLETED | OUTPATIENT
Start: 2021-10-13 | End: 2021-10-13

## 2021-10-13 RX ORDER — PIPERACILLIN AND TAZOBACTAM 4; .5 G/20ML; G/20ML
2.25 INJECTION, POWDER, LYOPHILIZED, FOR SOLUTION INTRAVENOUS EVERY 6 HOURS
Refills: 0 | Status: DISCONTINUED | OUTPATIENT
Start: 2021-10-13 | End: 2021-10-15

## 2021-10-13 RX ORDER — TOCILIZUMAB 20 MG/ML
600 INJECTION, SOLUTION, CONCENTRATE INTRAVENOUS ONCE
Refills: 0 | Status: DISCONTINUED | OUTPATIENT
Start: 2021-10-13 | End: 2021-10-13

## 2021-10-13 RX ORDER — CHLORHEXIDINE GLUCONATE 213 G/1000ML
1 SOLUTION TOPICAL
Refills: 0 | Status: DISCONTINUED | OUTPATIENT
Start: 2021-10-13 | End: 2021-10-21

## 2021-10-13 RX ADMIN — PIPERACILLIN AND TAZOBACTAM 200 GRAM(S): 4; .5 INJECTION, POWDER, LYOPHILIZED, FOR SOLUTION INTRAVENOUS at 13:52

## 2021-10-13 RX ADMIN — Medication 2: at 08:50

## 2021-10-13 RX ADMIN — Medication 5 UNIT(S): at 08:52

## 2021-10-13 RX ADMIN — HEPARIN SODIUM 5000 UNIT(S): 5000 INJECTION INTRAVENOUS; SUBCUTANEOUS at 22:13

## 2021-10-13 RX ADMIN — SIMVASTATIN 20 MILLIGRAM(S): 20 TABLET, FILM COATED ORAL at 22:14

## 2021-10-13 RX ADMIN — PIPERACILLIN AND TAZOBACTAM 200 GRAM(S): 4; .5 INJECTION, POWDER, LYOPHILIZED, FOR SOLUTION INTRAVENOUS at 19:08

## 2021-10-13 RX ADMIN — Medication 200 MILLIGRAM(S): at 11:50

## 2021-10-13 RX ADMIN — BENZOCAINE AND MENTHOL 1 LOZENGE: 5; 1 LIQUID ORAL at 09:25

## 2021-10-13 RX ADMIN — HEPARIN SODIUM 5000 UNIT(S): 5000 INJECTION INTRAVENOUS; SUBCUTANEOUS at 05:24

## 2021-10-13 RX ADMIN — PANTOPRAZOLE SODIUM 40 MILLIGRAM(S): 20 TABLET, DELAYED RELEASE ORAL at 05:24

## 2021-10-13 RX ADMIN — Medication 166.67 MILLIGRAM(S): at 12:57

## 2021-10-13 RX ADMIN — Medication 200 MILLIGRAM(S): at 05:25

## 2021-10-13 RX ADMIN — HEPARIN SODIUM 5000 UNIT(S): 5000 INJECTION INTRAVENOUS; SUBCUTANEOUS at 13:52

## 2021-10-13 RX ADMIN — INSULIN GLARGINE 12 UNIT(S): 100 INJECTION, SOLUTION SUBCUTANEOUS at 22:14

## 2021-10-13 RX ADMIN — AMLODIPINE BESYLATE 10 MILLIGRAM(S): 2.5 TABLET ORAL at 09:26

## 2021-10-13 NOTE — PROGRESS NOTE ADULT - ASSESSMENT
85F PMHx T2DM (last A1c 7.1 10/10), HLD, HTN presented to [facility] ED with 1wk h/o generalized weakness, fatigue, SOB, and cough in the setting of positive COVID PCR as outpatient (10/7), found to have JUDITH and troponinemia, initially admitted to [facility] for tele monitoring for AHRF and sepsis 2/2 COVID PNA. Also found with BCx positive for Staph hominis from admission and urinary retention, now resolved. O2 requirements weaned from HFNC to 6LNC w/ SpO2 resting >87%, JUDITH improving, troponinemia resolved, and pt stepped down to RMF on 10/12.     85F PMHx T2DM (last A1c 7.1 10/10), HLD, HTN presented to [facility] ED with 1wk h/o generalized weakness, fatigue, SOB, and cough in the setting of positive COVID PCR as outpatient (10/7), found to have JUDITH and troponinemia, initially admitted to [facility] for tele monitoring for AHRF and sepsis 2/2 COVID PNA. Also found with BCx positive for Staph hominis from admission and urinary retention, now resolved. O2 requirements weaned from HFNC to 6LNC w/ SpO2 resting >87%, JUDITH improving, troponinemia resolved, and pt stepped down to RMF on 10/12, course then complicated by aspiration, pt noted w/ worsening hypoxia and increased O2 requirements and WOB, ABG w/ pO2 60 and CXR showing worsening multilobar infiltrates c/w aspiration PNA+covid PNA, so decision was made to step back up to [facility] for further management of AHRF 2/2 COVID PNA+aspiration PNA. 85F PMHx T2DM (last A1c 7.1 10/10), HLD, HTN presented to [facility] ED with 1wk h/o generalized weakness, fatigue, SOB, and cough in the setting of positive COVID PCR as outpatient (10/7), found to have JUDITH and troponinemia, initially admitted to [facility] for tele monitoring for AHRF and sepsis 2/2 COVID PNA. Also found with BCx positive for Staph hominis from admission and urinary retention, now resolved. O2 requirements weaned from HFNC to 6LNC w/ SpO2 resting >87%, JUDITH improving, troponinemia resolved, and pt stepped down to RMF on 10/12, course then complicated by aspiration, pt noted w/ worsening hypoxia and increased O2 requirements and WOB, ABG w/ pO2 60 and CXR showing worsening multilobar infiltrates c/w aspiration PNA+covid PNA, so decision was made to step back up to [facility]MICU for further management of AHRF 2/2 COVID PNA+aspiration PNA.

## 2021-10-13 NOTE — PROGRESS NOTE ADULT - PROBLEM SELECTOR PLAN 1
Symptoms started approx ~10/5, went to PCP on 10/7 and given zithromax and tested for covid, notified of positive test by PCP on 10/8. Developed SOB and became altered, admitted 10/9.  - c/w Dexamethasone 6 mg PO daily 10 day course (10/10-10/19)  - c/w pantoprazole 40 mg PO daily while on steroids  - Remdesivir held due to impaired renal function   - Satting 90-95% on 6LNC yesterday (10/12), now satting 86% on HFNC 60/60, ICU consulted, f/u  - encourage proning  - D-Dimer 348 (10/11)<-345 (10/9)   - Ferritin 332 (10/11)<-283 (10/9)   - CRP 73 (10/11)<-111.4 (10/10)<-117.9 (10/9)  - ESR 65 (10/11)  - trend covid inflammatory markers q2d (last 10/11, next due 10/13)

## 2021-10-13 NOTE — PROGRESS NOTE ADULT - PROBLEM SELECTOR PLAN 9
RESOLVED  trop 0.02 on admission, no past cardiac medical history except for HTN/HLD. EKG was NSR, 77 bpm, TWI in III.  - Trop 0.01 repeat labs (10/10)

## 2021-10-13 NOTE — CONSULT NOTE ADULT - SUBJECTIVE AND OBJECTIVE BOX
Patient is a 85y old  Female who presents with a chief complaint of COVID PNA (13 Oct 2021 06:53)      Consult reason: Worsening hypoxemia   RMF vitals:   T Afeb  HR 50-60s  RR 26-30  /90  SpO2 86 HFNC 60/60  Labs: ABG with PaO2 60  CXR: Worsening B/L infiltrates with new infiltrate in RML compared to 10/12      HPI:  84 YO F with PMHx T2DM, HLD, HTN presented to [facility] ED on 10/9 with 1w Hx of generalized weakness, fatigue, SOB, decreased appetite, and cough in the setting of positive COVID PCR as outpatient. Patient reports no recent travel however has been in contact with her son who recently tested COVID positive 1 week prior. Pt saw her PMD (Dr. Knight [phone]) who reportedly told her to take mucinex and prescribed erythromycin for symptoms 3d ago. Pt was notified today that COVID test came back positive, told to come to ED.   Of note, patient received both doses of the Pfizer COVID vaccine in /Feb. Pt received flu shot in September. Also, pt currently reports continued cough, decreased appetite, weakness and fatigue. Pt does not smoke. Pt denies F/C, SOB, CP, palpitations, abd pain, N/V/D, HA, lightheadedness/dizziness. Additional history above provided by daughter Ela Belle [phone] to ED team.     ED COURSE  VS: T 97.3F (oral), , /65, RR 22, SpO2 80% (RA) --> 92% (6L NC)  Labs: WBC 10.77, Hgb 14.1, d-dimer 345, coags wnl, Na 133, bicarb 18, BUN/Cr 51/4.58, Glc 196, Ca 8.3, .9, ferritin 283, procal 1.32, lactate 1.8, trop 0.02, .   VBG: pH 7.27, pO2 41, pCO2 48, bicarb 22  EKG: NSR, 77 bpm, TWI in III  CXR - No pneumothorax, bilateral patchy opacities, No free air. heart normal. bones normal.  Interventions: Duonebs x2, Dexamethasone 6mg IVP, 1L NS bolus    Patient was seen by critical team and subsequently admitted to [facility] for further management of sepsis 2/2 COVID PNA.  (09 Oct 2021 20:13)    INTERVAL HPI: Pt was stepped down from 7L on 10/12 on 6L NC saturating in the low 90s. Late 10/12 the patient had an aspiration event when she vomited. At that time she desatted to 88% and was palced on NRB then escalated to HFNC 60/60 with saturations in the low 90s. On the AM of 10/13 the patient started desaturating to the low to mid 80s finally settling to 86% after self-proning for an hour. The patient denies any subjective worsening sob or worsening cough and overall says she feels better than when she was first admitted. The patient denies any fevers, chills, D/C, abd pain, dysuria, decreased urinary frequency, lightheadedness, CP or palpitations. Denies any productive cough, still just having a dry cough.           PAST MEDICAL & SURGICAL HISTORY:  Diabetes    HTN (hypertension)    No significant past surgical history        Home Medications:  amLODIPine 10 mg oral tablet: 1 tab(s) orally once a day (10 Oct 2021 01:47)  glimepiride 2 mg oral tablet: 2 milligram(s) orally 2 times a day (10 Oct 2021 01:47)  metFORMIN 1000 mg oral tablet: 1000 milligram(s) orally once a day (10 Oct 2021 01:47)  Metoprolol Succinate  mg oral tablet, extended release: 1 tab(s) orally once a day (10 Oct 2021 01:47)  NIFEdipine 60 mg oral tablet, extended release: 1 tab(s) orally once a day (10 Oct 2021 01:47)  simvastatin 20 mg oral tablet: 1 tab(s) orally once a day (at bedtime) (10 Oct 2021 01:47)    MEDICATIONS  (STANDING):  amLODIPine   Tablet 10 milliGRAM(s) Oral every 24 hours  dexAMETHasone  Injectable 6 milliGRAM(s) IV Push every 24 hours  dextrose 40% Gel 15 Gram(s) Oral once  dextrose 5%. 1000 milliLiter(s) (50 mL/Hr) IV Continuous <Continuous>  dextrose 5%. 1000 milliLiter(s) (100 mL/Hr) IV Continuous <Continuous>  dextrose 50% Injectable 25 Gram(s) IV Push once  dextrose 50% Injectable 12.5 Gram(s) IV Push once  dextrose 50% Injectable 25 Gram(s) IV Push once  glucagon  Injectable 1 milliGRAM(s) IntraMuscular once  guaiFENesin Oral Liquid (Sugar-Free) 200 milliGRAM(s) Oral every 6 hours  heparin   Injectable 5000 Unit(s) SubCutaneous every 8 hours  insulin glargine Injectable (LANTUS) 12 Unit(s) SubCutaneous at bedtime  insulin lispro (ADMELOG) corrective regimen sliding scale   SubCutaneous Before meals and at bedtime  insulin lispro Injectable (ADMELOG) 5 Unit(s) SubCutaneous three times a day before meals  metoprolol succinate  milliGRAM(s) Oral every 24 hours  pantoprazole    Tablet 40 milliGRAM(s) Oral before breakfast  piperacillin/tazobactam IVPB.. 2.25 Gram(s) IV Intermittent every 6 hours  simvastatin 20 milliGRAM(s) Oral at bedtime  tocilizumab (EUA) IVPB 600 milliGRAM(s) IV Intermittent once  vancomycin  IVPB 1250 milliGRAM(s) IV Intermittent once    MEDICATIONS  (PRN):  acetaminophen   Tablet .. 650 milliGRAM(s) Oral every 6 hours PRN Temp greater or equal to 38C (100.4F), Mild Pain (1 - 3)  benzocaine 15 mG/menthol 3.6 mG Lozenge 1 Lozenge Oral every 4 hours PRN Sore Throat      Allergies    Allergy Status Unknown    Intolerances        SOCIAL HX:       FAMILY HISTORY:  FAMILY HISTORY:  :      PHYSICAL EXAM:    ICU Vital Signs Last 24 Hrs  T(C): 36.8 (13 Oct 2021 12:40), Max: 36.8 (13 Oct 2021 12:40)  T(F): 98.2 (13 Oct 2021 12:40), Max: 98.2 (13 Oct 2021 12:40)  HR: 63 (13 Oct 2021 12:40) (50 - 115)  BP: 165/87 (13 Oct 2021 12:40) (134/69 - 181/94)  BP(mean): 95 (12 Oct 2021 18:00) (95 - 95)  ABP: --  ABP(mean): --  RR: 20 (13 Oct 2021 12:40) (20 - 36)  SpO2: 87% (13 Oct 2021 12:40) (81% - 97%)      General: NC/AT, lying in bed on HFNC speaking in full sentences with some belly breathing   HEENT: Dry MM  Neck: No JVD      Lungs: Crackles on the R mid lung zones, with B/L bronchial lung sounds.   Cardiovascular: RRR, nl s1, s2, no m/r/g appreciated  Gastrointestinal: abdomen soft, NTND, bowel sounds present  Musculoskeletal: No clubbing.  Moves all extremities.    Extremities: No LE swelling B/L   Skin: Warm, dry, intact. No rashes noted.  Neurological: A&Ox3      10-12-21 @ 07:01  -  10-13-21 @ 07:00  --------------------------------------------------------  IN:    Oral Fluid: 236 mL  Total IN: 236 mL    OUT:    Voided (mL): 400 mL  Total OUT: 400 mL    Total NET: -164 mL          LABS:                          13.1   8.47  )-----------( 289      ( 13 Oct 2021 06:45 )             40.3                                               10-13    134<L>  |  101  |  48<H>  ----------------------------<  343<H>  5.1   |  20<L>  |  2.77<H>    Ca    8.5      13 Oct 2021 06:45  Phos  2.7     10-13  Mg     2.3     10-13    TPro  7.0  /  Alb  3.4  /  TBili  0.3  /  DBili  x   /  AST  25  /  ALT  12  /  AlkPhos  49  10-13                                             Urinalysis Basic - ( 12 Oct 2021 14:18 )    Color: Yellow / Appearance: Clear / S.020 / pH: x  Gluc: x / Ketone: NEGATIVE  / Bili: Negative / Urobili: 0.2 E.U./dL   Blood: x / Protein: 100 mg/dL / Nitrite: NEGATIVE   Leuk Esterase: Small / RBC: < 5 /HPF / WBC > 10 /HPF   Sq Epi: x / Non Sq Epi: 0-5 /HPF / Bacteria: Many /HPF                                                  LIVER FUNCTIONS - ( 13 Oct 2021 06:45 )  Alb: 3.4 g/dL / Pro: 7.0 g/dL / ALK PHOS: 49 U/L / ALT: 12 U/L / AST: 25 U/L / GGT: x                                                  Culture - Blood (collected 11 Oct 2021 08:33)  Source: .Blood Blood  Preliminary Report (13 Oct 2021 09:01):    No growth at 2 days.                                                                                       ABG - ( 13 Oct 2021 12:26 )  pH, Arterial: 7.38  pH, Blood: x     /  pCO2: 37    /  pO2: 60    / HCO3: 22    / Base Excess: -2.8  /  SaO2: 92.3

## 2021-10-13 NOTE — PROGRESS NOTE ADULT - ATTENDING COMMENTS
Pt with AHRF due to covid and staph bacteremia is in resp distress with o2 through high flow. Pt was Rx with remdesevir and dexamethasone in icu and downgraded to floor but was unstable on coming to floor but later settled down  ICU consulted again    Assesment   AHRF due to covid Pt with AHRF due to covid and staph bacteremia is in resp distress with o2 through high flow. Pt was Rx with remdesevir and dexamethasone in icu and downgraded to floor but was unstable on coming to floor but later settled down  ICU consulted again    Assesment   AHRF due to covid  Bacteremia due to MSSA  JUDITH  HYponatremia    Plan   Pt trasferred to icu for further management

## 2021-10-13 NOTE — PROVIDER CONTACT NOTE (OTHER) - SITUATION
report received from cristal in micro that aeorobic culture bottel was positive for cochi in clusters
pt RR 24, spo2 86%. patient put on NRB spo2 93%
, O2 89% on non rebreather, /90
Pts BP of 181/94

## 2021-10-13 NOTE — PROGRESS NOTE ADULT - SUBJECTIVE AND OBJECTIVE BOX
*** TRANSFER FROM [facility] TO MICU ***    Hospital Course:  84 YO F with PMHx T2DM, HLD, HTN presented to [facility] ED on 10/9 with 1w Hx of generalized weakness, fatigue, SOB, decreased appetite, and cough in the setting of positive COVID PCR as outpatient. Patient reports no recent travel however has been in contact with her son who recently tested COVID positive 1 week prior. Pt saw her PMD (Dr. Knight [phone]) who reportedly told her to take mucinex and prescribed erythromycin for symptoms 3d ago. Pt was notified today that COVID test came back positive, told to come to ED. Of note, patient received both doses of the Pfizer COVID vaccine in /Feb. Pt received flu shot in September. Patient was stepped down from 7L on 10/12 on 6L NC saturating in the low 90s. Late 10/12 the patient had an aspiration event when she vomited. At that time she desatted to 88% and was placed on NRB then escalated to HFNC 60/60 with saturations in the low 90s. On the AM of 10/13 the patient started desaturating to the low to mid 80s finally settling to 86% after self-proning for an hour. On 10/13 pt noted w/ increased WOB and persistent hypoxia <88% on HFNC 60/60 despite proning. ICU was consulted, ABG showed pO2 60, CXR showed significant worsening multilobar infiltrates c/w aspiration PNA superimposed on covid PNA, so decision was made to step up to a/MICU for further management of AHRF 2/2 COVID PNA and now aspiration PNA.    SUBJECTIVE:   Patient seen and examined at bedside.. Patient states her breathing is comfortable at the moment.  Patient denies any recent fevers, chills, nausea, vomiting, headache, acute sob, chest pain, abdominal pain, genitourinary sx, extremity pain or swelling.     VITAL SIGNS:  ICU Vital Signs Last 24 Hrs  T(C): 36.2 (13 Oct 2021 13:39), Max: 36.8 (13 Oct 2021 12:40)  T(F): 97.2 (13 Oct 2021 13:39), Max: 98.2 (13 Oct 2021 12:40)  HR: 56 (13 Oct 2021 15:00) (50 - 115)  BP: 158/78 (13 Oct 2021 15:00) (134/69 - 181/94)  BP(mean): 111 (13 Oct 2021 15:00) (95 - 121)  ABP: --  ABP(mean): --  RR: 32 (13 Oct 2021 15:00) (20 - 36)  SpO2: 91% (13 Oct 2021 15:00) (81% - 97%)      10-12 @ :  -  10-13 @ 07:00  --------------------------------------------------------  IN: 236 mL / OUT: 400 mL / NET: -164 mL    10-13 @ 07:  -  10-13 @ 15:44  --------------------------------------------------------  IN: 100 mL / OUT: 0 mL / NET: 100 mL      CAPILLARY BLOOD GLUCOSE      POCT Blood Glucose.: 150 mg/dL (13 Oct 2021 12:08)      PHYSICAL EXAM:  General: Lying in bed on HFNC speaking in full sentences with some belly breathing   HEENT: NC/AT, Dry MM  Neck: No JVD      Lungs: Crackles on the R mid lung zones, with B/L bronchial lung sounds.   Cardiovascular: RRR, nl s1, s2, no m/r/g appreciated  Gastrointestinal: abdomen soft, NTND, bowel sounds present  Musculoskeletal: No clubbing.  Moves all extremities.    Extremities: No LE swelling B/L   Skin: Warm, dry, intact. No rashes noted.  Neurological: A&Ox3    MEDICATIONS:  MEDICATIONS  (STANDING):  amLODIPine   Tablet 10 milliGRAM(s) Oral every 24 hours  chlorhexidine 2% Cloths 1 Application(s) Topical <User Schedule>  dexAMETHasone  Injectable 6 milliGRAM(s) IV Push every 24 hours  dextrose 40% Gel 15 Gram(s) Oral once  dextrose 5%. 1000 milliLiter(s) (50 mL/Hr) IV Continuous <Continuous>  dextrose 5%. 1000 milliLiter(s) (100 mL/Hr) IV Continuous <Continuous>  dextrose 50% Injectable 25 Gram(s) IV Push once  dextrose 50% Injectable 12.5 Gram(s) IV Push once  dextrose 50% Injectable 25 Gram(s) IV Push once  glucagon  Injectable 1 milliGRAM(s) IntraMuscular once  guaiFENesin Oral Liquid (Sugar-Free) 200 milliGRAM(s) Oral every 6 hours  heparin   Injectable 5000 Unit(s) SubCutaneous every 8 hours  insulin glargine Injectable (LANTUS) 12 Unit(s) SubCutaneous at bedtime  insulin lispro (ADMELOG) corrective regimen sliding scale   SubCutaneous Before meals and at bedtime  insulin lispro Injectable (ADMELOG) 5 Unit(s) SubCutaneous three times a day before meals  metoprolol succinate  milliGRAM(s) Oral every 24 hours  pantoprazole    Tablet 40 milliGRAM(s) Oral before breakfast  piperacillin/tazobactam IVPB.. 2.25 Gram(s) IV Intermittent every 6 hours  simvastatin 20 milliGRAM(s) Oral at bedtime    MEDICATIONS  (PRN):  acetaminophen   Tablet .. 650 milliGRAM(s) Oral every 6 hours PRN Temp greater or equal to 38C (100.4F), Mild Pain (1 - 3)  benzocaine 15 mG/menthol 3.6 mG Lozenge 1 Lozenge Oral every 4 hours PRN Sore Throat    ALLERGIES:  Allergies    Allergy Status Unknown    Intolerances    LABS:                        13.1   8.47  )-----------( 289      ( 13 Oct 2021 06:45 )             40.3     10-13    134<L>  |  101  |  48<H>  ----------------------------<  343<H>  5.1   |  20<L>  |  2.77<H>    Ca    8.5      13 Oct 2021 06:45  Phos  2.7     10-13  Mg     2.3     10-    TPro  7.0  /  Alb  3.4  /  TBili  0.3  /  DBili  x   /  AST  25  /  ALT  12  /  AlkPhos  49  10-13      Urinalysis Basic - ( 12 Oct 2021 14:18 )    Color: Yellow / Appearance: Clear / S.020 / pH: x  Gluc: x / Ketone: NEGATIVE  / Bili: Negative / Urobili: 0.2 E.U./dL   Blood: x / Protein: 100 mg/dL / Nitrite: NEGATIVE   Leuk Esterase: Small / RBC: < 5 /HPF / WBC > 10 /HPF   Sq Epi: x / Non Sq Epi: 0-5 /HPF / Bacteria: Many /HPF        RADIOLOGY & ADDITIONAL TESTS: Reviewed.

## 2021-10-13 NOTE — CONSULT NOTE ADULT - ASSESSMENT
86 YO F with PMHx T2DM, HLD, HTN presented to [facility] ED on 10/9 with 1w Hx of generalized weakness, fatigue, SOB, decreased appetite, and cough in the setting of positive COVID PCR. Now with worsening hypoxemia i/s/o COVID PNA and new aspiration PNA     NEURO:  CORTES, AOx4    PULM:  #AHRF 2/2 COVID PNA and aspiration PNA  Pt with worsening hypoxemia on HFNC 60/60 with PaO2 60 on ABG with worsening B/L infiltrates and CXR and new RML infiltrate. Likely 2/2 to new aspiration PNA after event on 10/12. L  - Vancomycin and Zosyn (renal dosing) for HAP  - MRSA swab  - Obtain sputum cx   - Likely will need CT chest non-con  - Conisder TOCI  - C/w Decadron 6mg Q24h   - Trend CRP    CV:  #HTN  Pt home meds include amlodipine 10mg PO daily, metoprolol ER succinate 100mg PO daily and nifedipine 60mg ED daily  - c/w home amlodipine and metoprolol ER  - Can consider restarting nifedipine if BP remains elevated     GI:  #GI PPX  - C/w PPI while on decadron    ENDO:  #T2DM  Pt home meds include glimepiride 2mg BID and metformin 1g daily.  - A1c 7.1 (10/10)   - Currently at lantus 12U, lispro 5U pre-meal TID, adjust as needed   - ISS  - consistent carb diet    ID:  #Aspiration PNA  Pt with new RML infiltrate on CXR along with worsening B/L infiltrates.   - Given duration in hospital will cover with vanc/zosyn as above  - Obtain MRSA swab  - Obtain Bcx   - F/u procal     #COVID  Sxs started on 10/2, covid + on 10/9  - Management as above     #Asx bacteruria  No tx as this time      RENAL:  #JUDITH on possible CKD  Pt with no known hx of CKD but renal U/S showing chronic medical disease. Cr about 4.5 on admission s/p 12 hours of 100cc/hr LR pt with now improving Cr and GFR. Possible pre-renal in etiology vs. underlying covid infection. Currently urinating   - Continue to monitor  - Avoid nephrotoxic agents   - Strict I/Os     F: tolerating PO, no IVF  E: replete K<4, Mg<2  N: Dash/TLC/CC    VTE Prophylaxis: Subq heparin   C: Full Code  D: ICU    Discussed with Efrem Arteaga

## 2021-10-13 NOTE — PROCEDURE NOTE - NSICDXPROCEDURE_GEN_ALL_CORE_FT
PROCEDURES:  Insertion, catheter, intravenous, with general anesthesia 13-Oct-2021 16:20:49  Margaret Ellis

## 2021-10-13 NOTE — PROGRESS NOTE ADULT - SUBJECTIVE AND OBJECTIVE BOX
HOSPITAL COURSE:  85F PMHx T2DM, HLD, HTN presented to [facility] ED on 10/9 with 1wk h/o generalized weakness, fatigue, SOB, decreased appetite, and cough in the setting of positive COVID PCR as outpatient (10/7), found to be tachycardic to 140->77 and hypoxic to 80% on RA w/ improvement to 92% on 6LNC. Admission labs also notable for BUN/Cr 51/4.58 (pt without known kidney disease), procal 1.32, and trop 0.02. Pt was initially admitted to [facility] for tele monitoring for AHRF and sepsis 2/2 COVID PNA. Per covid protocol, pt was started on decadron (10/10-), remdesivir held given JUDITH. Pt was persistently hypoxic to 85-86 on 6LNC, so pt was placed on HFNC 50/50 w/ improvement to of SpO2 to 89-90%. Troponinemia resolved on recheck (10/10), likely 2/2 demand ischemia i/s/o AHRF and sepsis 2/2 covid. Admission BCx positive for 1/4 Staph hominis, surveillance BCx (10/11) NGTD. Pt initially found w/ urinary retention on bladder scans requiring straight cath on 10/10, no longer retaining >300 ccs on subsequent bladder scans, and JUDITH improved w/ Cr stable ~2.8. On 10/11, pt was weaned off HFNC and satting >87% on 6LNC. On 10/12, home BP meds were restarted as pt persistently hypertensive, and pt deemed stable for stepdown to [facility] on 10/1 for further management of COVID PNA. Shortly after arrival on [facility], pt aspirated during dinner and desatted to 80s on 6LNC, so pt was placed on NRB w/ improvement to 92 on 15L. However, overnight pt desatted again to 80s on NRB so was placed back on HFNC 60/60. On 10/13 pt noted w/ increased WOB and persistent hypoxia <88% on HFNC 60/60 despite proning. ICU was consulted, ABG showed ___, CXR showed ___, and decision???      INTERVAL HPI/OVERNIGHT EVENTS:  Patient was seen and examined at bedside.   Yesterday, pt aspirated during dinner and desatted to 80s on 6LNC, so pt was placed on NRB w/ improvement to 92 on 15L. However, pt desatted to 80s again on NRB so was placed back on HFNC 60/60. Also noted to have wet bedsheets with urine, ?primafit leakage. This morning, pt resting comfortably, hungry and eager to eat breakfast, still tired. Yesterday choked on tea, SOB was worse but now back at baseline. Pt feels breathing continues to improve, complained that TV was not working at one point, and that her sheets were not changed in a timely manner when she asked. Otherwise no complaints at this time. Patient denies: fever, chills, dizziness, HA, Changes in vision, CP, palpitations, SOB, cough, N/V/D/C, dysuria, changes in bowel movements, LE edema. ROS otherwise negative.    INTERVAL UPDATE:  After breakfast, pt had worsening SOB, hypoxic to 85-86% on HFNC 60/60, proned but SpO2 remaining 86-87% w/ good waveform. ICU consulted. ABG and STAT portable CXR ordered.     VITAL SIGNS:  T(F): 97.3 (10-13-21 @ 05:34)  HR: 50 (10-13-21 @ 09:30)  BP: 163/90 (10-13-21 @ 09:21)  RR: 20 (10-13-21 @ 05:34)  SpO2: 97% (10-13-21 @ 09:30)  Wt(kg): --    PHYSICAL EXAM:    Constitutional: pleasant obese elderly female lying in bed in NAD, HFNC in place, speaking in full sentences  HEENT: PERRL, EOMI, sclera anicteric, neck supple, no masses, no JVD, MMM, good dentition  Respiratory: on HFNC 60/60, mild crackles at bases, somewhat diminished lung sounds but poor inspiratory effort, no wheezing, no rhonchi, no rales; without accessory muscle use, no intercostal retractions  Cardiovascular: RRR, normal S1, S2; no M/R/G  Gastrointestinal: obese, exam limited by obesity; soft, NT; no masses palpable; BS normoactive x4 quadrants  Extremities: Warm and well perfused; 2+ pulses equal bilateral upper and lower extremities; no edema, cyanosis, or clubbing  Neurological: AOx3, CN II-XII grossly intact, no focal deficits   Skin: dark orange hue on dorsal extremities, chest, upper back, and outer face from self-mirlande as noted previously    MEDICATIONS  (STANDING):  amLODIPine   Tablet 10 milliGRAM(s) Oral every 24 hours  dexAMETHasone  Injectable 6 milliGRAM(s) IV Push every 24 hours  dextrose 40% Gel 15 Gram(s) Oral once  dextrose 5%. 1000 milliLiter(s) (50 mL/Hr) IV Continuous <Continuous>  dextrose 5%. 1000 milliLiter(s) (100 mL/Hr) IV Continuous <Continuous>  dextrose 50% Injectable 25 Gram(s) IV Push once  dextrose 50% Injectable 12.5 Gram(s) IV Push once  dextrose 50% Injectable 25 Gram(s) IV Push once  glucagon  Injectable 1 milliGRAM(s) IntraMuscular once  guaiFENesin Oral Liquid (Sugar-Free) 200 milliGRAM(s) Oral every 6 hours  heparin   Injectable 5000 Unit(s) SubCutaneous every 8 hours  insulin glargine Injectable (LANTUS) 12 Unit(s) SubCutaneous at bedtime  insulin lispro (ADMELOG) corrective regimen sliding scale   SubCutaneous Before meals and at bedtime  insulin lispro Injectable (ADMELOG) 5 Unit(s) SubCutaneous three times a day before meals  metoprolol succinate  milliGRAM(s) Oral every 24 hours  pantoprazole    Tablet 40 milliGRAM(s) Oral before breakfast  simvastatin 20 milliGRAM(s) Oral at bedtime    MEDICATIONS  (PRN):  acetaminophen   Tablet .. 650 milliGRAM(s) Oral every 6 hours PRN Temp greater or equal to 38C (100.4F), Mild Pain (1 - 3)  benzocaine 15 mG/menthol 3.6 mG Lozenge 1 Lozenge Oral every 4 hours PRN Sore Throat      Allergies    Allergy Status Unknown    Intolerances        LABS:                        13.1   8.47  )-----------( 289      ( 13 Oct 2021 06:45 )             40.3     10-13    134<L>  |  101  |  48<H>  ----------------------------<  343<H>  5.1   |  20<L>  |  2.77<H>    Ca    8.5      13 Oct 2021 06:45  Phos  2.7     10-13  Mg     2.3     10-    TPro  7.0  /  Alb  3.4  /  TBili  0.3  /  DBili  x   /  AST  25  /  ALT  12  /  AlkPhos  49  10-13      Urinalysis Basic - ( 12 Oct 2021 14:18 )    Color: Yellow / Appearance: Clear / S.020 / pH: x  Gluc: x / Ketone: NEGATIVE  / Bili: Negative / Urobili: 0.2 E.U./dL   Blood: x / Protein: 100 mg/dL / Nitrite: NEGATIVE   Leuk Esterase: Small / RBC: < 5 /HPF / WBC > 10 /HPF   Sq Epi: x / Non Sq Epi: 0-5 /HPF / Bacteria: Many /HPF      CAPILLARY BLOOD GLUCOSE      POCT Blood Glucose.: 230 mg/dL (13 Oct 2021 08:12)  POCT Blood Glucose.: 112 mg/dL (12 Oct 2021 22:19)  POCT Blood Glucose.: 237 mg/dL (12 Oct 2021 17:18)  POCT Blood Glucose.: 274 mg/dL (12 Oct 2021 11:51)      MICROBIOLOGY:    Culture - Blood (collected 11 Oct 2021 08:33)  Source: .Blood Blood  Preliminary Report (13 Oct 2021 09:01):    No growth at 2 days.             HOSPITAL COURSE:  85F PMHx T2DM, HLD, HTN presented to [facility] ED on 10/9 with 1wk h/o generalized weakness, fatigue, SOB, decreased appetite, and cough in the setting of positive COVID PCR as outpatient (10/7), found to be tachycardic to 140->77 and hypoxic to 80% on RA w/ improvement to 92% on 6LNC. Admission labs also notable for BUN/Cr 51/4.58 (pt without known kidney disease), procal 1.32, and trop 0.02. Pt was initially admitted to [facility] for tele monitoring for AHRF and sepsis 2/2 COVID PNA. Per covid protocol, pt was started on decadron (10/10-), remdesivir held given JUDITH. Pt was persistently hypoxic to 85-86 on 6LNC, so pt was placed on HFNC 50/50 w/ improvement to of SpO2 to 89-90%. Troponinemia resolved on recheck (10/10), likely 2/2 demand ischemia i/s/o AHRF and sepsis 2/2 covid. Admission BCx positive for 1/4 Staph hominis, surveillance BCx (10/11) NGTD. Pt initially found w/ urinary retention on bladder scans requiring straight cath on 10/10, no longer retaining >300 ccs on subsequent bladder scans, and JUDITH improved w/ Cr stable ~2.8. On 10/11, pt was weaned off HFNC and satting >87% on 6LNC. On 10/12, home BP meds were restarted as pt persistently hypertensive, and pt deemed stable for stepdown to [facility] on 10/1 for further management of COVID PNA. Shortly after arrival on [facility], pt aspirated during dinner and desatted to 80s on 6LNC, so pt was placed on NRB w/ improvement to 92 on 15L. However, overnight pt desatted again to 80s on NRB so was placed back on HFNC 60/60. On 10/13 pt noted w/ increased WOB and persistent hypoxia <88% on HFNC 60/60 despite proning. ICU was consulted, ABG showed pO2 60, CXR showed significant worsening multilobar infiltrates c/w aspiration PNA superimposed on covid PNA, so decision was made to step back up to [facility] for further management of AHRF 2/2 COVID PNA and now aspiration PNA.      INTERVAL HPI/OVERNIGHT EVENTS:  Patient was seen and examined at bedside.   Yesterday, pt aspirated during dinner and desatted to 80s on 6LNC, so pt was placed on NRB w/ improvement to 92 on 15L. However, pt desatted to 80s again on NRB so was placed back on HFNC 60/60. Also noted to have wet bedsheets with urine, ?primafit leakage. This morning, pt resting comfortably, hungry and eager to eat breakfast, still tired. Yesterday choked on tea, SOB was worse but now back at baseline. Pt feels breathing continues to improve, complained that TV was not working at one point, and that her sheets were not changed in a timely manner when she asked. Otherwise no complaints at this time. Patient denies: fever, chills, dizziness, HA, Changes in vision, CP, palpitations, SOB, cough, N/V/D/C, dysuria, changes in bowel movements, LE edema. ROS otherwise negative.    INTERVAL UPDATE:  After breakfast, pt had worsening SOB, hypoxic to 85-86% on HFNC 60/60, proned but SpO2 remaining 86-87% w/ good waveform. ICU consulted. ABG and STAT portable CXR ordered.     VITAL SIGNS:  T(F): 97.3 (10-13-21 @ 05:34)  HR: 50 (10-13-21 @ 09:30)  BP: 163/90 (10-13-21 @ 09:21)  RR: 20 (10-13-21 @ 05:34)  SpO2: 97% (10-13-21 @ 09:30)  Wt(kg): --    PHYSICAL EXAM:    Constitutional: pleasant obese elderly female lying in bed in NAD, HFNC in place, speaking in full sentences  HEENT: PERRL, EOMI, sclera anicteric, neck supple, no masses, no JVD, MMM, good dentition  Respiratory: on HFNC 60/60, mild crackles at bases, somewhat diminished lung sounds but poor inspiratory effort, no wheezing, no rhonchi, no rales; without accessory muscle use, no intercostal retractions  Cardiovascular: RRR, normal S1, S2; no M/R/G  Gastrointestinal: obese, exam limited by obesity; soft, NT; no masses palpable; BS normoactive x4 quadrants  Extremities: Warm and well perfused; 2+ pulses equal bilateral upper and lower extremities; no edema, cyanosis, or clubbing  Neurological: AOx3, CN II-XII grossly intact, no focal deficits   Skin: dark orange hue on dorsal extremities, chest, upper back, and outer face from self-mirlande as noted previously    MEDICATIONS  (STANDING):  amLODIPine   Tablet 10 milliGRAM(s) Oral every 24 hours  dexAMETHasone  Injectable 6 milliGRAM(s) IV Push every 24 hours  dextrose 40% Gel 15 Gram(s) Oral once  dextrose 5%. 1000 milliLiter(s) (50 mL/Hr) IV Continuous <Continuous>  dextrose 5%. 1000 milliLiter(s) (100 mL/Hr) IV Continuous <Continuous>  dextrose 50% Injectable 25 Gram(s) IV Push once  dextrose 50% Injectable 12.5 Gram(s) IV Push once  dextrose 50% Injectable 25 Gram(s) IV Push once  glucagon  Injectable 1 milliGRAM(s) IntraMuscular once  guaiFENesin Oral Liquid (Sugar-Free) 200 milliGRAM(s) Oral every 6 hours  heparin   Injectable 5000 Unit(s) SubCutaneous every 8 hours  insulin glargine Injectable (LANTUS) 12 Unit(s) SubCutaneous at bedtime  insulin lispro (ADMELOG) corrective regimen sliding scale   SubCutaneous Before meals and at bedtime  insulin lispro Injectable (ADMELOG) 5 Unit(s) SubCutaneous three times a day before meals  metoprolol succinate  milliGRAM(s) Oral every 24 hours  pantoprazole    Tablet 40 milliGRAM(s) Oral before breakfast  simvastatin 20 milliGRAM(s) Oral at bedtime    MEDICATIONS  (PRN):  acetaminophen   Tablet .. 650 milliGRAM(s) Oral every 6 hours PRN Temp greater or equal to 38C (100.4F), Mild Pain (1 - 3)  benzocaine 15 mG/menthol 3.6 mG Lozenge 1 Lozenge Oral every 4 hours PRN Sore Throat      Allergies    Allergy Status Unknown    Intolerances        LABS:                        13.1   8.47  )-----------( 289      ( 13 Oct 2021 06:45 )             40.3     10-13    134<L>  |  101  |  48<H>  ----------------------------<  343<H>  5.1   |  20<L>  |  2.77<H>    Ca    8.5      13 Oct 2021 06:45  Phos  2.7     10-13  Mg     2.3     10-    TPro  7.0  /  Alb  3.4  /  TBili  0.3  /  DBili  x   /  AST  25  /  ALT  12  /  AlkPhos  49  10-13      Urinalysis Basic - ( 12 Oct 2021 14:18 )    Color: Yellow / Appearance: Clear / S.020 / pH: x  Gluc: x / Ketone: NEGATIVE  / Bili: Negative / Urobili: 0.2 E.U./dL   Blood: x / Protein: 100 mg/dL / Nitrite: NEGATIVE   Leuk Esterase: Small / RBC: < 5 /HPF / WBC > 10 /HPF   Sq Epi: x / Non Sq Epi: 0-5 /HPF / Bacteria: Many /HPF      CAPILLARY BLOOD GLUCOSE      POCT Blood Glucose.: 230 mg/dL (13 Oct 2021 08:12)  POCT Blood Glucose.: 112 mg/dL (12 Oct 2021 22:19)  POCT Blood Glucose.: 237 mg/dL (12 Oct 2021 17:18)  POCT Blood Glucose.: 274 mg/dL (12 Oct 2021 11:51)      MICROBIOLOGY:    Culture - Blood (collected 11 Oct 2021 08:33)  Source: .Blood Blood  Preliminary Report (13 Oct 2021 09:01):    No growth at 2 days.             *******TRANSFER NOTE FROM [facility] TO AST/MICU******    HOSPITAL COURSE:  85F PMHx T2DM, HLD, HTN presented to [facility] ED on 10/9 with 1wk h/o generalized weakness, fatigue, SOB, decreased appetite, and cough in the setting of positive COVID PCR as outpatient (10/7), found to be tachycardic to 140->77 and hypoxic to 80% on RA w/ improvement to 92% on 6LNC. Admission labs also notable for BUN/Cr 51/4.58 (pt without known kidney disease), procal 1.32, and trop 0.02. Pt was initially admitted to [facility] for tele monitoring for AHRF and sepsis 2/2 COVID PNA. Per covid protocol, pt was started on decadron (10/10-), remdesivir held given JUDITH. Pt was persistently hypoxic to 85-86 on 6LNC, so pt was placed on HFNC 50/50 w/ improvement to of SpO2 to 89-90%. Troponinemia resolved on recheck (10/10), likely 2/2 demand ischemia i/s/o AHRF and sepsis 2/2 covid. Admission BCx positive for 1/4 Staph hominis, surveillance BCx (10/11) NGTD. Pt initially found w/ urinary retention on bladder scans requiring straight cath on 10/10, no longer retaining >300 ccs on subsequent bladder scans, and JUDITH improved w/ Cr stable ~2.8. On 10/11, pt was weaned off HFNC and satting >87% on 6LNC. On 10/12, home BP meds were restarted as pt persistently hypertensive, and pt deemed stable for stepdown to [facility] on 10/1 for further management of COVID PNA. Shortly after arrival on [facility], pt aspirated during dinner and desatted to 80s on 6LNC, so pt was placed on NRB w/ improvement to 92 on 15L. However, overnight pt desatted again to 80s on NRB so was placed back on HFNC 60/60. On 10/13 pt noted w/ increased WOB and persistent hypoxia <88% on HFNC 60/60 despite proning. ICU was consulted, ABG showed pO2 60, CXR showed significant worsening multilobar infiltrates c/w aspiration PNA superimposed on covid PNA, so decision was made to step up to [facility]/MICU for further management of AHRF 2/2 COVID PNA and now aspiration PNA.      INTERVAL HPI/OVERNIGHT EVENTS:  Patient was seen and examined at bedside.   Yesterday, pt aspirated during dinner and desatted to 80s on 6LNC, so pt was placed on NRB w/ improvement to 92 on 15L. However, pt desatted to 80s again on NRB so was placed back on HFNC 60/60. Also noted to have wet bedsheets with urine, ?primafit leakage. This morning, pt resting comfortably, hungry and eager to eat breakfast, still tired. Yesterday choked on tea, SOB was worse but now back at baseline. Pt feels breathing continues to improve, complained that TV was not working at one point, and that her sheets were not changed in a timely manner when she asked. Otherwise no complaints at this time. Patient denies: fever, chills, dizziness, HA, Changes in vision, CP, palpitations, SOB, cough, N/V/D/C, dysuria, changes in bowel movements, LE edema. ROS otherwise negative.    INTERVAL UPDATE:  After breakfast, pt had worsening SOB, hypoxic to 85-86% on HFNC 60/60, proned but SpO2 remaining 86-87% w/ good waveform. ICU consulted. ABG and STAT portable CXR ordered.     VITAL SIGNS:  T(F): 97.3 (10-13-21 @ 05:34)  HR: 50 (10-13-21 @ 09:30)  BP: 163/90 (10-13-21 @ 09:21)  RR: 20 (10-13-21 @ 05:34)  SpO2: 97% (10-13-21 @ 09:30)  Wt(kg): --    PHYSICAL EXAM:    Constitutional: pleasant obese elderly female lying in bed in NAD, HFNC in place, speaking in full sentences  HEENT: PERRL, EOMI, sclera anicteric, neck supple, no masses, no JVD, MMM, good dentition  Respiratory: on HFNC 60/60, mild crackles at bases, somewhat diminished lung sounds but poor inspiratory effort, no wheezing, no rhonchi, no rales; without accessory muscle use, no intercostal retractions  Cardiovascular: RRR, normal S1, S2; no M/R/G  Gastrointestinal: obese, exam limited by obesity; soft, NT; no masses palpable; BS normoactive x4 quadrants  Extremities: Warm and well perfused; 2+ pulses equal bilateral upper and lower extremities; no edema, cyanosis, or clubbing  Neurological: AOx3, CN II-XII grossly intact, no focal deficits   Skin: dark orange hue on dorsal extremities, chest, upper back, and outer face from self-mirlande as noted previously    MEDICATIONS  (STANDING):  amLODIPine   Tablet 10 milliGRAM(s) Oral every 24 hours  dexAMETHasone  Injectable 6 milliGRAM(s) IV Push every 24 hours  dextrose 40% Gel 15 Gram(s) Oral once  dextrose 5%. 1000 milliLiter(s) (50 mL/Hr) IV Continuous <Continuous>  dextrose 5%. 1000 milliLiter(s) (100 mL/Hr) IV Continuous <Continuous>  dextrose 50% Injectable 25 Gram(s) IV Push once  dextrose 50% Injectable 12.5 Gram(s) IV Push once  dextrose 50% Injectable 25 Gram(s) IV Push once  glucagon  Injectable 1 milliGRAM(s) IntraMuscular once  guaiFENesin Oral Liquid (Sugar-Free) 200 milliGRAM(s) Oral every 6 hours  heparin   Injectable 5000 Unit(s) SubCutaneous every 8 hours  insulin glargine Injectable (LANTUS) 12 Unit(s) SubCutaneous at bedtime  insulin lispro (ADMELOG) corrective regimen sliding scale   SubCutaneous Before meals and at bedtime  insulin lispro Injectable (ADMELOG) 5 Unit(s) SubCutaneous three times a day before meals  metoprolol succinate  milliGRAM(s) Oral every 24 hours  pantoprazole    Tablet 40 milliGRAM(s) Oral before breakfast  simvastatin 20 milliGRAM(s) Oral at bedtime    MEDICATIONS  (PRN):  acetaminophen   Tablet .. 650 milliGRAM(s) Oral every 6 hours PRN Temp greater or equal to 38C (100.4F), Mild Pain (1 - 3)  benzocaine 15 mG/menthol 3.6 mG Lozenge 1 Lozenge Oral every 4 hours PRN Sore Throat      Allergies    Allergy Status Unknown    Intolerances        LABS:                        13.1   8.47  )-----------( 289      ( 13 Oct 2021 06:45 )             40.3     10-13    134<L>  |  101  |  48<H>  ----------------------------<  343<H>  5.1   |  20<L>  |  2.77<H>    Ca    8.5      13 Oct 2021 06:45  Phos  2.7     10-  Mg     2.3     10-13    TPro  7.0  /  Alb  3.4  /  TBili  0.3  /  DBili  x   /  AST  25  /  ALT  12  /  AlkPhos  49  10-      Urinalysis Basic - ( 12 Oct 2021 14:18 )    Color: Yellow / Appearance: Clear / S.020 / pH: x  Gluc: x / Ketone: NEGATIVE  / Bili: Negative / Urobili: 0.2 E.U./dL   Blood: x / Protein: 100 mg/dL / Nitrite: NEGATIVE   Leuk Esterase: Small / RBC: < 5 /HPF / WBC > 10 /HPF   Sq Epi: x / Non Sq Epi: 0-5 /HPF / Bacteria: Many /HPF      CAPILLARY BLOOD GLUCOSE      POCT Blood Glucose.: 230 mg/dL (13 Oct 2021 08:12)  POCT Blood Glucose.: 112 mg/dL (12 Oct 2021 22:19)  POCT Blood Glucose.: 237 mg/dL (12 Oct 2021 17:18)  POCT Blood Glucose.: 274 mg/dL (12 Oct 2021 11:51)      MICROBIOLOGY:    Culture - Blood (collected 11 Oct 2021 08:33)  Source: .Blood Blood  Preliminary Report (13 Oct 2021 09:01):    No growth at 2 days.

## 2021-10-13 NOTE — PROGRESS NOTE ADULT - PROBLEM SELECTOR PLAN 5
Likely 2/2 decreased PO intake/dehydration, possible component of SIADH  - Na stable at approx 130 while on 7Lach, 136 morning of transfer to [facility] (10/12) s/p salt tabs TID x1d (10/12)  - encourage PO intake  - monitor BMP off salt tabs  - consider starting sodium bicarb 650 mg TID given persistent hypocarbia on labs (likely 2/2 renal dysfunction)

## 2021-10-13 NOTE — PROGRESS NOTE ADULT - PROBLEM SELECTOR PLAN 4
Cr 4.58 at presentation, pt reports that she has one enlarged kidney (L>R on renal US) but is not aware of any kidney disease diagnosis. Also voided >1L on 10/10 and found to be retaining >300ccs requiring straight cath. Cr downtrending, improved to 2.85 today (10/12). Renal US from 10/10 c/w renal disease  - BUN/Cr 51/4.58  - FeNa 1.2 consistent w/ intrarenal etiology, likely 2/2 ATN but also possible component of obstruction given urinary retention  - Avoid nephrotoxic meds, renally dose medications   - continue to trend BUN/Cr  - strict I/Os (has primafit), monitor for post-ATN diuresis   - obtain collateral from PCP (Dr. Knight [phone]) re: renal disease

## 2021-10-13 NOTE — PROGRESS NOTE ADULT - PROBLEM SELECTOR PLAN 6
Pt home meds include glimepiride 2mg BID and metformin 1g daily.  - A1c 7.1 (10/10)   - c/w lantus 12U, lispro 5U pre-meal TID  - ISS  - consistent carb diet

## 2021-10-13 NOTE — PROGRESS NOTE ADULT - PROBLEM SELECTOR PLAN 10
F: None, pt eating and drinking  E: Replete PRN, maintain K>4, Mg>2, caution repletion i/s/o JUDITH  N: DASH/TLC w/ consistent carbs, renal restrictions     VTE PPx: Heparin SubQ  GI PPx: protonix 40mg PO daily    CODE: FULL CODE    Dispo: pending medical stabilization for likely d/c to home w/ son F: None, pt eating and drinking  E: Replete PRN, maintain K>4, Mg>2, caution repletion i/s/o JUDITH  N: DASH/TLC w/ consistent carbs, renal restrictions     VTE PPx: Heparin SubQ  GI PPx: protonix 40mg PO daily    CODE: FULL CODE    Dispo: [facility]->[facility] F: None, pt eating and drinking  E: Replete PRN, maintain K>4, Mg>2, caution repletion i/s/o JUDITH  N: DASH/TLC w/ consistent carbs, renal restrictions     VTE PPx: Heparin SubQ  GI PPx: protonix 40mg PO daily    CODE: FULL CODE    Dispo: [facility]->[facility]U

## 2021-10-13 NOTE — PROGRESS NOTE ADULT - ASSESSMENT
86 YO F with PMHx T2DM, HLD, HTN presented to [facility] ED on 10/9 with 1w Hx of generalized weakness, fatigue, SOB, decreased appetite, and cough in the setting of positive COVID PCR. Now with worsening hypoxemia i/s/o COVID PNA and new aspiration PNA     NEURO:  CORTES, AOx4    PULM:  #AHRF 2/2 COVID PNA and aspiration PNA  Pt with worsening hypoxemia on HFNC 60/60 with PaO2 60 on ABG with worsening B/L infiltrates and CXR and new RML infiltrate. Likely 2/2 to new aspiration PNA after event on 10/12. L  - Vancomycin and Zosyn (renal dosing) for HAP  - MRSA swab  - Obtain sputum cx   - Likely will need CT chest non-con  - Conisder TOCI  - C/w Decadron 6mg Q24h   - Trend CRP    CV:  #HTN  Pt home meds include amlodipine 10mg PO daily, metoprolol ER succinate 100mg PO daily and nifedipine 60mg ED daily  - c/w home amlodipine and metoprolol ER  - Can consider restarting nifedipine if BP remains elevated     GI:  #GI PPX  - C/w PPI while on decadron    ENDO:  #T2DM  Pt home meds include glimepiride 2mg BID and metformin 1g daily.  - A1c 7.1 (10/10)   - Currently at lantus 12U, lispro 5U pre-meal TID, adjust as needed   - ISS  - consistent carb diet    ID:  #Aspiration PNA  Pt with new RML infiltrate on CXR along with worsening B/L infiltrates.   - Given duration in hospital will cover with vanc/zosyn as above  - Obtain MRSA swab  - F/u Bcx   - F/u procal     #COVID  Sxs started on 10/2, covid + on 10/9  - Management as above     #Asx bacteruria  No tx as this time      RENAL:  #JUDITH on possible CKD  Pt with no known hx of CKD but renal U/S showing chronic medical disease. Cr about 4.5 on admission s/p 12 hours of 100cc/hr LR pt with now improving Cr and GFR. Possible pre-renal in etiology vs. underlying covid infection. Currently urinating   - Continue to monitor  - Avoid nephrotoxic agents   - Strict I/Os     F: tolerating PO, no IVF  E: replete K<4, Mg<2  N: Dash/TLC/CC  VTE Prophylaxis: Subq heparin   C: Full Code  D: ICU

## 2021-10-13 NOTE — PROGRESS NOTE ADULT - PROBLEM SELECTOR PLAN 3
Admission BCx (10/9) growing Staph hominis (1/4 bottles, aerobic), pending susceptibility testing. Likely skin contaminant, as patient afebrile and clinically improving.   - f/u surveillance BCx (10/11): NGTD x2d  - f/u BCx (10/9)  - continue to monitor s/s infection

## 2021-10-13 NOTE — PROGRESS NOTE ADULT - PROBLEM SELECTOR PLAN 7
Pt home meds include amlodipine 10mg PO daily, metoprolol ER succinate 100mg PO daily and nifedipine 60mg ED daily  - c/w home amlodipine and metoprolol ER  - holding home nifedipine, as pt already on CCB, clarify indication w/ PCP

## 2021-10-13 NOTE — PROCEDURE NOTE - NSPROCDETAILS_GEN_ALL_CORE
ultrasound-guidance/location identified, draped/prepped, sterile technique used/blood seen on insertion/dressing applied/flushes easily/secured in place/sterile technique, catheter placed

## 2021-10-14 DIAGNOSIS — Z71.89 OTHER SPECIFIED COUNSELING: ICD-10-CM

## 2021-10-14 DIAGNOSIS — Z51.5 ENCOUNTER FOR PALLIATIVE CARE: ICD-10-CM

## 2021-10-14 DIAGNOSIS — R06.00 DYSPNEA, UNSPECIFIED: ICD-10-CM

## 2021-10-14 LAB
ALBUMIN SERPL ELPH-MCNC: 3.1 G/DL — LOW (ref 3.3–5)
ALP SERPL-CCNC: 46 U/L — SIGNIFICANT CHANGE UP (ref 40–120)
ALT FLD-CCNC: 11 U/L — SIGNIFICANT CHANGE UP (ref 10–45)
ANION GAP SERPL CALC-SCNC: 14 MMOL/L — SIGNIFICANT CHANGE UP (ref 5–17)
AST SERPL-CCNC: 16 U/L — SIGNIFICANT CHANGE UP (ref 10–40)
BASE EXCESS BLDA CALC-SCNC: -1.1 MMOL/L — SIGNIFICANT CHANGE UP (ref -2–3)
BASOPHILS # BLD AUTO: 0.04 K/UL — SIGNIFICANT CHANGE UP (ref 0–0.2)
BASOPHILS NFR BLD AUTO: 0.5 % — SIGNIFICANT CHANGE UP (ref 0–2)
BILIRUB SERPL-MCNC: 0.4 MG/DL — SIGNIFICANT CHANGE UP (ref 0.2–1.2)
BUN SERPL-MCNC: 45 MG/DL — HIGH (ref 7–23)
CALCIUM SERPL-MCNC: 8.6 MG/DL — SIGNIFICANT CHANGE UP (ref 8.4–10.5)
CHLORIDE SERPL-SCNC: 101 MMOL/L — SIGNIFICANT CHANGE UP (ref 96–108)
CO2 BLDA-SCNC: 25 MMOL/L — HIGH (ref 19–24)
CO2 SERPL-SCNC: 21 MMOL/L — LOW (ref 22–31)
CREAT SERPL-MCNC: 2.66 MG/DL — HIGH (ref 0.5–1.3)
CRP SERPL-MCNC: 53.5 MG/L — HIGH (ref 0–4)
CULTURE RESULTS: SIGNIFICANT CHANGE UP
D DIMER BLD IA.RAPID-MCNC: 306 NG/ML DDU — HIGH
EOSINOPHIL # BLD AUTO: 0.01 K/UL — SIGNIFICANT CHANGE UP (ref 0–0.5)
EOSINOPHIL NFR BLD AUTO: 0.1 % — SIGNIFICANT CHANGE UP (ref 0–6)
FERRITIN SERPL-MCNC: 310 NG/ML — HIGH (ref 15–150)
GLUCOSE BLDC GLUCOMTR-MCNC: 168 MG/DL — HIGH (ref 70–99)
GLUCOSE BLDC GLUCOMTR-MCNC: 171 MG/DL — HIGH (ref 70–99)
GLUCOSE BLDC GLUCOMTR-MCNC: 242 MG/DL — HIGH (ref 70–99)
GLUCOSE BLDC GLUCOMTR-MCNC: 244 MG/DL — HIGH (ref 70–99)
GLUCOSE SERPL-MCNC: 265 MG/DL — HIGH (ref 70–99)
HCO3 BLDA-SCNC: 24 MMOL/L — SIGNIFICANT CHANGE UP (ref 21–28)
HCT VFR BLD CALC: 40.1 % — SIGNIFICANT CHANGE UP (ref 34.5–45)
HGB BLD-MCNC: 13.2 G/DL — SIGNIFICANT CHANGE UP (ref 11.5–15.5)
IMM GRANULOCYTES NFR BLD AUTO: 5.1 % — HIGH (ref 0–1.5)
LYMPHOCYTES # BLD AUTO: 1.14 K/UL — SIGNIFICANT CHANGE UP (ref 1–3.3)
LYMPHOCYTES # BLD AUTO: 12.9 % — LOW (ref 13–44)
MAGNESIUM SERPL-MCNC: 2.1 MG/DL — SIGNIFICANT CHANGE UP (ref 1.6–2.6)
MCHC RBC-ENTMCNC: 28.9 PG — SIGNIFICANT CHANGE UP (ref 27–34)
MCHC RBC-ENTMCNC: 32.9 GM/DL — SIGNIFICANT CHANGE UP (ref 32–36)
MCV RBC AUTO: 87.7 FL — SIGNIFICANT CHANGE UP (ref 80–100)
MONOCYTES # BLD AUTO: 0.56 K/UL — SIGNIFICANT CHANGE UP (ref 0–0.9)
MONOCYTES NFR BLD AUTO: 6.3 % — SIGNIFICANT CHANGE UP (ref 2–14)
NEUTROPHILS # BLD AUTO: 6.67 K/UL — SIGNIFICANT CHANGE UP (ref 1.8–7.4)
NEUTROPHILS NFR BLD AUTO: 75.1 % — SIGNIFICANT CHANGE UP (ref 43–77)
NRBC # BLD: 0 /100 WBCS — SIGNIFICANT CHANGE UP (ref 0–0)
PCO2 BLDA: 38 MMHG — HIGH (ref 32–35)
PH BLDA: 7.4 — SIGNIFICANT CHANGE UP (ref 7.35–7.45)
PHOSPHATE SERPL-MCNC: 2.6 MG/DL — SIGNIFICANT CHANGE UP (ref 2.5–4.5)
PLATELET # BLD AUTO: 271 K/UL — SIGNIFICANT CHANGE UP (ref 150–400)
PO2 BLDA: 70 MMHG — LOW (ref 83–108)
POTASSIUM SERPL-MCNC: 4.9 MMOL/L — SIGNIFICANT CHANGE UP (ref 3.5–5.3)
POTASSIUM SERPL-SCNC: 4.9 MMOL/L — SIGNIFICANT CHANGE UP (ref 3.5–5.3)
PROT SERPL-MCNC: 6.9 G/DL — SIGNIFICANT CHANGE UP (ref 6–8.3)
RBC # BLD: 4.57 M/UL — SIGNIFICANT CHANGE UP (ref 3.8–5.2)
RBC # FLD: 13.4 % — SIGNIFICANT CHANGE UP (ref 10.3–14.5)
SAO2 % BLDA: 95.1 % — SIGNIFICANT CHANGE UP (ref 94–98)
SODIUM SERPL-SCNC: 136 MMOL/L — SIGNIFICANT CHANGE UP (ref 135–145)
SPECIMEN SOURCE: SIGNIFICANT CHANGE UP
VANCOMYCIN TROUGH SERPL-MCNC: 11.3 UG/ML — SIGNIFICANT CHANGE UP (ref 10–20)
WBC # BLD: 8.87 K/UL — SIGNIFICANT CHANGE UP (ref 3.8–10.5)
WBC # FLD AUTO: 8.87 K/UL — SIGNIFICANT CHANGE UP (ref 3.8–10.5)

## 2021-10-14 PROCEDURE — 99223 1ST HOSP IP/OBS HIGH 75: CPT

## 2021-10-14 PROCEDURE — 71045 X-RAY EXAM CHEST 1 VIEW: CPT | Mod: 26

## 2021-10-14 PROCEDURE — 99497 ADVNCD CARE PLAN 30 MIN: CPT | Mod: 25

## 2021-10-14 PROCEDURE — 99291 CRITICAL CARE FIRST HOUR: CPT

## 2021-10-14 PROCEDURE — 99358 PROLONG SERVICE W/O CONTACT: CPT

## 2021-10-14 PROCEDURE — 93308 TTE F-UP OR LMTD: CPT | Mod: 26

## 2021-10-14 RX ORDER — VANCOMYCIN HCL 1 G
1500 VIAL (EA) INTRAVENOUS ONCE
Refills: 0 | Status: COMPLETED | OUTPATIENT
Start: 2021-10-14 | End: 2021-10-14

## 2021-10-14 RX ORDER — INSULIN LISPRO 100/ML
VIAL (ML) SUBCUTANEOUS
Refills: 0 | Status: DISCONTINUED | OUTPATIENT
Start: 2021-10-14 | End: 2021-10-21

## 2021-10-14 RX ADMIN — Medication 2: at 17:29

## 2021-10-14 RX ADMIN — HEPARIN SODIUM 5000 UNIT(S): 5000 INJECTION INTRAVENOUS; SUBCUTANEOUS at 06:40

## 2021-10-14 RX ADMIN — HEPARIN SODIUM 5000 UNIT(S): 5000 INJECTION INTRAVENOUS; SUBCUTANEOUS at 14:01

## 2021-10-14 RX ADMIN — Medication 100 MILLIGRAM(S): at 21:22

## 2021-10-14 RX ADMIN — Medication 200 MILLIGRAM(S): at 17:14

## 2021-10-14 RX ADMIN — Medication 200 MILLIGRAM(S): at 11:43

## 2021-10-14 RX ADMIN — HEPARIN SODIUM 5000 UNIT(S): 5000 INJECTION INTRAVENOUS; SUBCUTANEOUS at 21:24

## 2021-10-14 RX ADMIN — PIPERACILLIN AND TAZOBACTAM 200 GRAM(S): 4; .5 INJECTION, POWDER, LYOPHILIZED, FOR SOLUTION INTRAVENOUS at 11:42

## 2021-10-14 RX ADMIN — Medication 2: at 21:33

## 2021-10-14 RX ADMIN — Medication 300 MILLIGRAM(S): at 14:00

## 2021-10-14 RX ADMIN — Medication 6 MILLIGRAM(S): at 21:27

## 2021-10-14 RX ADMIN — BENZOCAINE AND MENTHOL 1 LOZENGE: 5; 1 LIQUID ORAL at 21:23

## 2021-10-14 RX ADMIN — Medication 4: at 12:37

## 2021-10-14 RX ADMIN — Medication 5 UNIT(S): at 17:29

## 2021-10-14 RX ADMIN — CHLORHEXIDINE GLUCONATE 1 APPLICATION(S): 213 SOLUTION TOPICAL at 06:41

## 2021-10-14 RX ADMIN — Medication 2: at 07:07

## 2021-10-14 RX ADMIN — Medication 6 MILLIGRAM(S): at 00:37

## 2021-10-14 RX ADMIN — PIPERACILLIN AND TAZOBACTAM 200 GRAM(S): 4; .5 INJECTION, POWDER, LYOPHILIZED, FOR SOLUTION INTRAVENOUS at 17:13

## 2021-10-14 RX ADMIN — Medication 5 UNIT(S): at 12:36

## 2021-10-14 RX ADMIN — Medication 200 MILLIGRAM(S): at 00:36

## 2021-10-14 RX ADMIN — PIPERACILLIN AND TAZOBACTAM 200 GRAM(S): 4; .5 INJECTION, POWDER, LYOPHILIZED, FOR SOLUTION INTRAVENOUS at 23:15

## 2021-10-14 RX ADMIN — Medication 200 MILLIGRAM(S): at 06:41

## 2021-10-14 RX ADMIN — Medication 5 UNIT(S): at 07:17

## 2021-10-14 RX ADMIN — Medication 200 MILLIGRAM(S): at 23:14

## 2021-10-14 RX ADMIN — AMLODIPINE BESYLATE 10 MILLIGRAM(S): 2.5 TABLET ORAL at 07:02

## 2021-10-14 RX ADMIN — PIPERACILLIN AND TAZOBACTAM 200 GRAM(S): 4; .5 INJECTION, POWDER, LYOPHILIZED, FOR SOLUTION INTRAVENOUS at 06:39

## 2021-10-14 RX ADMIN — PANTOPRAZOLE SODIUM 40 MILLIGRAM(S): 20 TABLET, DELAYED RELEASE ORAL at 06:40

## 2021-10-14 RX ADMIN — SIMVASTATIN 20 MILLIGRAM(S): 20 TABLET, FILM COATED ORAL at 21:22

## 2021-10-14 RX ADMIN — PIPERACILLIN AND TAZOBACTAM 200 GRAM(S): 4; .5 INJECTION, POWDER, LYOPHILIZED, FOR SOLUTION INTRAVENOUS at 00:37

## 2021-10-14 RX ADMIN — INSULIN GLARGINE 12 UNIT(S): 100 INJECTION, SOLUTION SUBCUTANEOUS at 21:21

## 2021-10-14 RX ADMIN — BENZOCAINE AND MENTHOL 1 LOZENGE: 5; 1 LIQUID ORAL at 17:14

## 2021-10-14 NOTE — CONSULT NOTE ADULT - PROBLEM SELECTOR RECOMMENDATION 9
2/2 AHRF from COVID PNA cb aspiration PNA (10/12 witnessed event) subjectively feels comfortable on HFNC 60/60 2/2 AHRF from COVID PNA cb aspiration PNA (10/12 witnessed event) subjectively feels comfortable on HFNC 60/60. decadron 6mg q24h  -cw care per pulm, MICU

## 2021-10-14 NOTE — CONSULT NOTE ADULT - SUBJECTIVE AND OBJECTIVE BOX
HPI:  85F PMHx T2DM, HLD, HTN presented to [facility] ED on 10/9 with 1wk h/o generalized weakness, fatigue, SOB, decreased appetite, and cough in the setting of positive COVID PCR as outpatient (10/7).    At admission pt found to be tachycardic to 140->77 and hypoxic to 80% on RA w/ improvement to 92% on 6LNC. Admission labs also notable for BUN/Cr 51/4.58 (pt without known kidney disease), procal 1.32, and trop 0.02. Pt was initially admitted to [facility] for tele monitoring for AHRF and sepsis 2/2 COVID PNA. Per covid protocol, pt was started on decadron (10/10-), remdesivir held given JUDITH. Pt was persistently hypoxic to 85-86 on 6LNC, so pt was placed on HFNC 50/50 w/ improvement to of SpO2 to 89-90%. Troponinemia resolved on recheck (10/10), likely 2/2 demand ischemia i/s/o AHRF and sepsis 2/2 covid. Admission BCx positive for 1/4 Staph hominis, surveillance BCx (10/11) NGTD. Pt initially found w/ urinary retention on bladder scans requiring straight cath on 10/10, no longer retaining >300 ccs on subsequent bladder scans, and JUDITH improved w/ Cr stable ~2.8. On 10/11, pt was weaned off HFNC and satting >87% on 6LNC. On 10/12, home BP meds were restarted as pt persistently hypertensive, and pt deemed stable for stepdown to [facility] on 10/1 for further management of COVID PNA. Shortly after arrival on [facility], pt aspirated during dinner and desatted to 80s on 6LNC, so pt was placed on NRB w/ improvement to 92 on 15L. However, overnight pt desatted again to 80s on NRB so was placed back on HFNC 60/60. On 10/13 pt noted w/ increased WOB and persistent hypoxia <88% on HFNC 60/60 despite proning. ICU was consulted, ABG showed pO2 60, CXR showed significant worsening multilobar infiltrates c/w aspiration PNA superimposed on covid PNA, so decision was made to step up to 7Ea/MICU for further management of AHRF 2/2 COVID PNA and now aspiration PNA.    Palliative care triggered for evaluation.     PRESENT SYMPTOMS:   [ ]Unable to obtain due to poor mentation   Source if other than patient:  [ ]Family   [ ]Team     Pain (Impact on QOL):  None    PAIN AD Score:   [email or web address] (press ctrl +  left click to view)    If [ ], pt denies symptom.   Dyspnea:                             [x ]Mild, comfortable on HFNC  [ ]Moderate   [ ]Severe  Anxiety:                             [ ]Mild [ ]Moderate [ ]Severe  Fatigue:                             [ x]Mild [ ]Moderate [ ]Severe  Nausea:                             [ ]Mild [ ]Moderate [ ]Severe  Loss of appetite:              [ ]Mild [ ]Moderate [ ]Severe  Constipation:                    [ ]Mild [ ]Moderate [ ]Severe  Grief Present                    [ ] Yes   [x ] No   Other Symptoms: pt coping well with complicated hospitalization. engages in life review.     [x ]All other review of systems negative     Opiate Naive (Y/N):  Yes  -iStop reviewed (Y/N): Yes.   No Rx found on iStop review (Ref#:   [id number]    PAST MEDICAL & SURGICAL HISTORY:  Diabetes  HTN (hypertension)  No significant past surgical history    FAMILY HISTORY:   Reviewed and found non contributory in mother or father    SOCIAL HISTORY: born in [place], raised in [place]. . Lives alone. Two adult children are very involved in pt's life and care. Oriental orthodox anup.       PSYCHOSOCIAL ASSESSMENT:  Taoism/Spiritual practice: Oriental orthodox   Role of organized Uatsdin [ ] important [x ] some [ ] unable to assess dt pt mentation   Coping: [x ] well [ ] with difficulty [ ] poor coping [ ] unable to assess dt pt mentation  Support system: [ ] strong [x ] adequate [ ] inadequate    Allergies  Allergy Status Unknown  Intolerances    Medications:      MEDICATIONS  (STANDING):  amLODIPine   Tablet 10 milliGRAM(s) Oral every 24 hours  chlorhexidine 2% Cloths 1 Application(s) Topical <User Schedule>  dexAMETHasone  Injectable 6 milliGRAM(s) IV Push every 24 hours  dextrose 40% Gel 15 Gram(s) Oral once  dextrose 5%. 1000 milliLiter(s) (50 mL/Hr) IV Continuous <Continuous>  dextrose 5%. 1000 milliLiter(s) (100 mL/Hr) IV Continuous <Continuous>  dextrose 50% Injectable 25 Gram(s) IV Push once  dextrose 50% Injectable 12.5 Gram(s) IV Push once  dextrose 50% Injectable 25 Gram(s) IV Push once  glucagon  Injectable 1 milliGRAM(s) IntraMuscular once  guaiFENesin Oral Liquid (Sugar-Free) 200 milliGRAM(s) Oral every 6 hours  heparin   Injectable 5000 Unit(s) SubCutaneous every 8 hours  insulin glargine Injectable (LANTUS) 12 Unit(s) SubCutaneous at bedtime  insulin lispro (ADMELOG) corrective regimen sliding scale   SubCutaneous Before meals and at bedtime  insulin lispro Injectable (ADMELOG) 5 Unit(s) SubCutaneous three times a day before meals  metoprolol succinate  milliGRAM(s) Oral every 24 hours  pantoprazole    Tablet 40 milliGRAM(s) Oral before breakfast  piperacillin/tazobactam IVPB.. 2.25 Gram(s) IV Intermittent every 6 hours  simvastatin 20 milliGRAM(s) Oral at bedtime  vancomycin  IVPB 1500 milliGRAM(s) IV Intermittent once    MEDICATIONS  (PRN):  acetaminophen   Tablet .. 650 milliGRAM(s) Oral every 6 hours PRN Temp greater or equal to 38C (100.4F), Mild Pain (1 - 3)  benzocaine 15 mG/menthol 3.6 mG Lozenge 1 Lozenge Oral every 4 hours PRN Sore Throat      Labs:    CBC:                        13.2   8.87  )-----------( 271      ( 14 Oct 2021 06:31 )             40.1     CMP:    10-14    136  |  101  |  45<H>  ----------------------------<  265<H>  4.9   |  21<L>  |  2.66<H>    Ca    8.6      14 Oct 2021 06:31  Phos  2.6     10-14  Mg     2.1     10-14    TPro  6.9  /  Alb  3.1<L>  /  TBili  0.4  /  DBili  x   /  AST  16  /  ALT  11  /  AlkPhos  46  10-14       Urinalysis Basic - ( 12 Oct 2021 14:18 )    Color: Yellow / Appearance: Clear / S.020 / pH: x  Gluc: x / Ketone: NEGATIVE  / Bili: Negative / Urobili: 0.2 E.U./dL   Blood: x / Protein: 100 mg/dL / Nitrite: NEGATIVE   Leuk Esterase: Small / RBC: < 5 /HPF / WBC > 10 /HPF   Sq Epi: x / Non Sq Epi: 0-5 /HPF / Bacteria: Many /HPF      RADIOLOGY & ADDITIONAL STUDIES:  Imaging:  Reviewed    PROTEIN CALORIE MALNUTRITION PRESENT:  [ ] Yes  [x ] No  Albumin, Serum: 3.1 (10/14)  [ ] PPSV2 < or = to 30%   [ ] significant weight loss    [ ] poor nutritional intake   [ ] catabolic state   [ ] anasarca       Artificial Nutrition [ ]       PEx:  T(C): 36.1 (10-14-21 @ 09:00), Max: 37.3 (10-13-21 @ 22:13)  HR: 67 (10-14-21 @ 13:00) (54 - 100)  BP: 124/70 (10-14-21 @ 13:00) (115/68 - 183/75)  RR: 30 (10-14-21 @ 13:00) (22 - 39)  SpO2: 92% (10-14-21 @ 13:00) (85% - 96%)  Wt(kg): --    GEN: elderly woman sitting up in bed, NAD  HEENT: atraumatic, no temporal wasting, MMM  RESP: Regular rhythm, no accessory muscle use, diminished bilat bases, bilat bronchial lung sounds   CARD: No tachycardia, regular rhythm, s1/s2, +tele   GI: protuberant, non distended, non tender with light palpation, normoactive BSx4  EXTR: No cyanosis, No edema  NEURO: Alert, oriented x3  PSYCH: calm, pleasant, enjoys engaging in life review     Preadmit Karnofsky: 80 %             Current Karnofsky:   20  %    BMI: 29  Wt: 77  Cachexia (Y/N): Y    PALLIATIVE MEDICINE COORDINATION OF CARE DOCUMENTATION: [x] Inpatient Consult  Non-Face-to-Face prolonged service provided that relates to (face-to-face) care that has or will occur and ongoing patient management, including one or more of the following: - Reviewed documentation from other physicians and other health care professional services - Reviewed medical records and diagnostic / radiology study results - Coordination with patient's support system  ************************************************************************  MEDICATION REVIEW:  - See Medication List Above    ISTOP REFERENCE:   - no active Rx's / see ISTOP Chart Note  - PRN usage: NO PRN'S  ------------------------------------------------------------------------  COORDINATION OF CARE:  - Palliative Care consulted for: Trigger   - Patient (to be) assessed: 10/14/2021  - Patient previously seen by Palliative Care service: NO    ADVANCE CARE PLANNING  - Code status: FULL  - MOLST reviewed in chart: NONE; None found on Alpha  - HCP/ Surrogate: Ela Belle daughter #[phone]  - [facility] documents: NONE found on Alpha  - HCP/ Living will/Other Advanced Directives in Alpha: NONE found on Alpha  ------------------------------------------------------------------------  CARE PROVIDER DOCUMENTATION:  - FAITH/EVELIA notes: Remains medically active  - MICU : mgmt    PLAN OF CARE  - Known admissions in past year:  current   - Current admit date: 10/9/2021   - LOS: five days   - LACE score: 8  - Current dispo plan: TO BE DETERMINED    10-09-21 (5d)  ------------------------------------------------------------------------  - Time Spent/Chart reviewed: 31 Minutes [including time used to gather, review and transfer data]  - Start: 13:15 p  - End: 13:45    Prolonged services rendered, as part of this patient's care provided by Palliative Medicine, include: i. chart review for provider and ancillary service documentation, ii. pertinent diagnostics including laboratory and imaging studies, iii. medication review including PRN use, iv. admission history including previous palliative care encounters and GOC notes, v. advance care planning documents including HCP and MOLST forms in Alpha. Part of Palliative Medicine extended evaluation and management also involves coordination of care with our IDT, the primary and consulting teams, and unit CM/SW and Hospice if eligible. Recommendations based on the information gathered and discussed are outlined in the A/P of Palliative notes.

## 2021-10-14 NOTE — CONSULT NOTE ADULT - PROBLEM SELECTOR RECOMMENDATION 4
84 yo F with COVID pna, encounter for palliative care ICU trigger   -coping well with hospitalization  -continue current plan of care   -all questions answered, emotional support provided  -Please contact Palliative Medicine 24/7 at [phone] for any acute symptoms or questions   -Palliative medicine will sign off. Please reconsult if the patient's symptoms and/or goals of care change, need to be readdressed, or if patient is readmitted in the future.

## 2021-10-14 NOTE — CONSULT NOTE ADULT - PROBLEM SELECTOR RECOMMENDATION 3
-Pt , her daughter Ela Belle (#[phone]) is primary point of contact.   -cw full code, trial of intubation if necessary  -coping well, finds strength through family support   -Evangelical anup   -Emotional support provided at bedside

## 2021-10-14 NOTE — PROGRESS NOTE ADULT - SUBJECTIVE AND OBJECTIVE BOX
** INCOMPLETE **     INTERVAL HPI/OVERNIGHT EVENTS:    SUBJECTIVE: Patient seen and examined at bedside.    VITAL SIGNS:  ICU Vital Signs Last 24 Hrs  T(C): 37.2 (14 Oct 2021 06:24), Max: 37.3 (13 Oct 2021 22:13)  T(F): 99 (14 Oct 2021 06:24), Max: 99.1 (13 Oct 2021 22:13)  HR: 60 (14 Oct 2021 05:10) (50 - 80)  BP: 183/75 (14 Oct 2021 04:00) (143/72 - 183/75)  BP(mean): 108 (14 Oct 2021 04:00) (98 - 124)  ABP: --  ABP(mean): --  RR: 33 (14 Oct 2021 05:10) (20 - 39)  SpO2: 96% (14 Oct 2021 05:10) (86% - 97%)        10-12 @ 07:  -  10-13 @ 07:00  --------------------------------------------------------  IN: 236 mL / OUT: 400 mL / NET: -164 mL    10-13 @ 07:  -  10-14 @ 06:26  --------------------------------------------------------  IN: 200 mL / OUT: 600 mL / NET: -400 mL      CAPILLARY BLOOD GLUCOSE      POCT Blood Glucose.: 136 mg/dL (13 Oct 2021 22:33)      PHYSICAL EXAM:    Constitutional: NAD  HEENT: NC/AT; PERRL, anicteric sclera; MMM  Neck: supple, no JVD  Cardiovascular: +S1/S2, RRR  Respiratory: CTA B/L, no W/R/R  Gastrointestinal: abdomen soft, NT/ND; no rebound or guarding; +BSx4  Genitourinary: no suprapubic tenderness or fullness  Extremities: WWP; no LE edema; no clubbing or cyanosis  Vascular: 2+ radial, DP/PT and femoral pulses B/L  Dermatologic: normal color and turgor; no visible rashes  Neurological:     MEDICATIONS:  MEDICATIONS  (STANDING):  amLODIPine   Tablet 10 milliGRAM(s) Oral every 24 hours  chlorhexidine 2% Cloths 1 Application(s) Topical <User Schedule>  dexAMETHasone  Injectable 6 milliGRAM(s) IV Push every 24 hours  dextrose 40% Gel 15 Gram(s) Oral once  dextrose 5%. 1000 milliLiter(s) (50 mL/Hr) IV Continuous <Continuous>  dextrose 5%. 1000 milliLiter(s) (100 mL/Hr) IV Continuous <Continuous>  dextrose 50% Injectable 25 Gram(s) IV Push once  dextrose 50% Injectable 12.5 Gram(s) IV Push once  dextrose 50% Injectable 25 Gram(s) IV Push once  glucagon  Injectable 1 milliGRAM(s) IntraMuscular once  guaiFENesin Oral Liquid (Sugar-Free) 200 milliGRAM(s) Oral every 6 hours  heparin   Injectable 5000 Unit(s) SubCutaneous every 8 hours  insulin glargine Injectable (LANTUS) 12 Unit(s) SubCutaneous at bedtime  insulin lispro (ADMELOG) corrective regimen sliding scale   SubCutaneous Before meals and at bedtime  insulin lispro Injectable (ADMELOG) 5 Unit(s) SubCutaneous three times a day before meals  metoprolol succinate  milliGRAM(s) Oral every 24 hours  pantoprazole    Tablet 40 milliGRAM(s) Oral before breakfast  piperacillin/tazobactam IVPB.. 2.25 Gram(s) IV Intermittent every 6 hours  simvastatin 20 milliGRAM(s) Oral at bedtime    MEDICATIONS  (PRN):  acetaminophen   Tablet .. 650 milliGRAM(s) Oral every 6 hours PRN Temp greater or equal to 38C (100.4F), Mild Pain (1 - 3)  benzocaine 15 mG/menthol 3.6 mG Lozenge 1 Lozenge Oral every 4 hours PRN Sore Throat      ALLERGIES:  Allergies    Allergy Status Unknown    Intolerances        LABS:                        13.1   8.47  )-----------( 289      ( 13 Oct 2021 06:45 )             40.3     1013    134<L>  |  101  |  48<H>  ----------------------------<  343<H>  5.1   |  20<L>  |  2.77<H>    Ca    8.5      13 Oct 2021 06:45  Phos  2.7     10-  Mg     2.3     10-    TPro  7.0  /  Alb  3.4  /  TBili  0.3  /  DBili  x   /  AST  25  /  ALT  12  /  AlkPhos  49  10-      Urinalysis Basic - ( 12 Oct 2021 14:18 )    Color: Yellow / Appearance: Clear / S.020 / pH: x  Gluc: x / Ketone: NEGATIVE  / Bili: Negative / Urobili: 0.2 E.U./dL   Blood: x / Protein: 100 mg/dL / Nitrite: NEGATIVE   Leuk Esterase: Small / RBC: < 5 /HPF / WBC > 10 /HPF   Sq Epi: x / Non Sq Epi: 0-5 /HPF / Bacteria: Many /HPF        RADIOLOGY & ADDITIONAL TESTS: Reviewed. INTERVAL HPI/OVERNIGHT EVENTS:    SUBJECTIVE:   Patient seen and examined at bedside.    VITAL SIGNS:  ICU Vital Signs Last 24 Hrs  T(C): 37.2 (14 Oct 2021 06:24), Max: 37.3 (13 Oct 2021 22:13)  T(F): 99 (14 Oct 2021 06:24), Max: 99.1 (13 Oct 2021 22:13)  HR: 60 (14 Oct 2021 05:10) (50 - 80)  BP: 183/75 (14 Oct 2021 04:00) (143/72 - 183/75)  BP(mean): 108 (14 Oct 2021 04:00) (98 - 124)  ABP: --  ABP(mean): --  RR: 33 (14 Oct 2021 05:10) (20 - 39)  SpO2: 96% (14 Oct 2021 05:10) (86% - 97%)        10-12 @ 07:  -  10-13 @ 07:00  --------------------------------------------------------  IN: 236 mL / OUT: 400 mL / NET: -164 mL    10-13 @ 07:  -  10-14 @ 06:26  --------------------------------------------------------  IN: 200 mL / OUT: 600 mL / NET: -400 mL      CAPILLARY BLOOD GLUCOSE      POCT Blood Glucose.: 136 mg/dL (13 Oct 2021 22:33)    PHYSICAL EXAM:  General: Lying in bed on HFNC  HEENT: NC/AT, Dry MM   Neck: No JVD, supple      Lungs: Bibasilar and midzone crackles appreciated, with B/L bronchial lung sounds  Cardiovascular: S1, S2, RRR, no MGR  Gastrointestinal: abdomen soft, NT/ND, bowel sounds present  Musculoskeletal: No clubbing. Moves all extremities.    Extremities: UE non-pitting edema. No lower extremity edema. No cyanosis or clubbing appreciated. WWP, 2+ pulses.   Neurological: A&Ox3    MEDICATIONS:  MEDICATIONS  (STANDING):  amLODIPine   Tablet 10 milliGRAM(s) Oral every 24 hours  chlorhexidine 2% Cloths 1 Application(s) Topical <User Schedule>  dexAMETHasone  Injectable 6 milliGRAM(s) IV Push every 24 hours  dextrose 40% Gel 15 Gram(s) Oral once  dextrose 5%. 1000 milliLiter(s) (50 mL/Hr) IV Continuous <Continuous>  dextrose 5%. 1000 milliLiter(s) (100 mL/Hr) IV Continuous <Continuous>  dextrose 50% Injectable 25 Gram(s) IV Push once  dextrose 50% Injectable 12.5 Gram(s) IV Push once  dextrose 50% Injectable 25 Gram(s) IV Push once  glucagon  Injectable 1 milliGRAM(s) IntraMuscular once  guaiFENesin Oral Liquid (Sugar-Free) 200 milliGRAM(s) Oral every 6 hours  heparin   Injectable 5000 Unit(s) SubCutaneous every 8 hours  insulin glargine Injectable (LANTUS) 12 Unit(s) SubCutaneous at bedtime  insulin lispro (ADMELOG) corrective regimen sliding scale   SubCutaneous Before meals and at bedtime  insulin lispro Injectable (ADMELOG) 5 Unit(s) SubCutaneous three times a day before meals  metoprolol succinate  milliGRAM(s) Oral every 24 hours  pantoprazole    Tablet 40 milliGRAM(s) Oral before breakfast  piperacillin/tazobactam IVPB.. 2.25 Gram(s) IV Intermittent every 6 hours  simvastatin 20 milliGRAM(s) Oral at bedtime    MEDICATIONS  (PRN):  acetaminophen   Tablet .. 650 milliGRAM(s) Oral every 6 hours PRN Temp greater or equal to 38C (100.4F), Mild Pain (1 - 3)  benzocaine 15 mG/menthol 3.6 mG Lozenge 1 Lozenge Oral every 4 hours PRN Sore Throat      ALLERGIES:  Allergies    Allergy Status Unknown    Intolerances      LABS:                        13.1   8.47  )-----------( 289      ( 13 Oct 2021 06:45 )             40.3     10-13    134<L>  |  101  |  48<H>  ----------------------------<  343<H>  5.1   |  20<L>  |  2.77<H>    Ca    8.5      13 Oct 2021 06:45  Phos  2.7     10-  Mg     2.3     10-    TPro  7.0  /  Alb  3.4  /  TBili  0.3  /  DBili  x   /  AST  25  /  ALT  12  /  AlkPhos  49  10-      Urinalysis Basic - ( 12 Oct 2021 14:18 )    Color: Yellow / Appearance: Clear / S.020 / pH: x  Gluc: x / Ketone: NEGATIVE  / Bili: Negative / Urobili: 0.2 E.U./dL   Blood: x / Protein: 100 mg/dL / Nitrite: NEGATIVE   Leuk Esterase: Small / RBC: < 5 /HPF / WBC > 10 /HPF   Sq Epi: x / Non Sq Epi: 0-5 /HPF / Bacteria: Many /HPF      RADIOLOGY & ADDITIONAL TESTS: Reviewed. INTERVAL HPI/OVERNIGHT EVENTS:  No acute events overnight.     SUBJECTIVE:   Patient seen and examined at bedside, resting in bed on HFNC, and does not appear to be in any acute distress. Patient endorsing breathing comfortably while on HFNC. Patient denies any recent fevers, chills, nausea, vomiting, headache, chest pain, abdominal pain, genitourinary sx.    VITAL SIGNS:  ICU Vital Signs Last 24 Hrs  T(C): 37.2 (14 Oct 2021 06:24), Max: 37.3 (13 Oct 2021 22:13)  T(F): 99 (14 Oct 2021 06:24), Max: 99.1 (13 Oct 2021 22:13)  HR: 60 (14 Oct 2021 05:10) (50 - 80)  BP: 183/75 (14 Oct 2021 04:00) (143/72 - 183/75)  BP(mean): 108 (14 Oct 2021 04:00) (98 - 124)  ABP: --  ABP(mean): --  RR: 33 (14 Oct 2021 05:10) (20 - 39)  SpO2: 96% (14 Oct 2021 05:10) (86% - 97%)        10-12 @ 07:  -  10-13 @ 07:00  --------------------------------------------------------  IN: 236 mL / OUT: 400 mL / NET: -164 mL    10-13 @ 07:  -  10-14 @ 06:26  --------------------------------------------------------  IN: 200 mL / OUT: 600 mL / NET: -400 mL      CAPILLARY BLOOD GLUCOSE      POCT Blood Glucose.: 136 mg/dL (13 Oct 2021 22:33)    PHYSICAL EXAM:  General: Lying in bed on HFNC  HEENT: NC/AT, Dry MM   Neck: No JVD, supple      Lungs: Bibasilar and midzone crackles appreciated, with B/L bronchial lung sounds  Cardiovascular: S1, S2, RRR, no MGR  Gastrointestinal: abdomen soft, NT/ND, bowel sounds present  Musculoskeletal: No clubbing. Moves all extremities.    Extremities: UE non-pitting edema. No lower extremity edema. No cyanosis or clubbing appreciated. WWP, 2+ pulses.   Neurological: A&Ox3    MEDICATIONS:  MEDICATIONS  (STANDING):  amLODIPine   Tablet 10 milliGRAM(s) Oral every 24 hours  chlorhexidine 2% Cloths 1 Application(s) Topical <User Schedule>  dexAMETHasone  Injectable 6 milliGRAM(s) IV Push every 24 hours  dextrose 40% Gel 15 Gram(s) Oral once  dextrose 5%. 1000 milliLiter(s) (50 mL/Hr) IV Continuous <Continuous>  dextrose 5%. 1000 milliLiter(s) (100 mL/Hr) IV Continuous <Continuous>  dextrose 50% Injectable 25 Gram(s) IV Push once  dextrose 50% Injectable 12.5 Gram(s) IV Push once  dextrose 50% Injectable 25 Gram(s) IV Push once  glucagon  Injectable 1 milliGRAM(s) IntraMuscular once  guaiFENesin Oral Liquid (Sugar-Free) 200 milliGRAM(s) Oral every 6 hours  heparin   Injectable 5000 Unit(s) SubCutaneous every 8 hours  insulin glargine Injectable (LANTUS) 12 Unit(s) SubCutaneous at bedtime  insulin lispro (ADMELOG) corrective regimen sliding scale   SubCutaneous Before meals and at bedtime  insulin lispro Injectable (ADMELOG) 5 Unit(s) SubCutaneous three times a day before meals  metoprolol succinate  milliGRAM(s) Oral every 24 hours  pantoprazole    Tablet 40 milliGRAM(s) Oral before breakfast  piperacillin/tazobactam IVPB.. 2.25 Gram(s) IV Intermittent every 6 hours  simvastatin 20 milliGRAM(s) Oral at bedtime    MEDICATIONS  (PRN):  acetaminophen   Tablet .. 650 milliGRAM(s) Oral every 6 hours PRN Temp greater or equal to 38C (100.4F), Mild Pain (1 - 3)  benzocaine 15 mG/menthol 3.6 mG Lozenge 1 Lozenge Oral every 4 hours PRN Sore Throat      ALLERGIES:  Allergies    Allergy Status Unknown    Intolerances      LABS:                        13.1   8.47  )-----------( 289      ( 13 Oct 2021 06:45 )             40.3     10-13    134<L>  |  101  |  48<H>  ----------------------------<  343<H>  5.1   |  20<L>  |  2.77<H>    Ca    8.5      13 Oct 2021 06:45  Phos  2.7     10-  Mg     2.3     10-13    TPro  7.0  /  Alb  3.4  /  TBili  0.3  /  DBili  x   /  AST  25  /  ALT  12  /  AlkPhos  49  10      Urinalysis Basic - ( 12 Oct 2021 14:18 )    Color: Yellow / Appearance: Clear / S.020 / pH: x  Gluc: x / Ketone: NEGATIVE  / Bili: Negative / Urobili: 0.2 E.U./dL   Blood: x / Protein: 100 mg/dL / Nitrite: NEGATIVE   Leuk Esterase: Small / RBC: < 5 /HPF / WBC > 10 /HPF   Sq Epi: x / Non Sq Epi: 0-5 /HPF / Bacteria: Many /HPF      RADIOLOGY & ADDITIONAL TESTS: Reviewed.

## 2021-10-14 NOTE — DIETITIAN INITIAL EVALUATION ADULT. - OTHER CALCULATIONS
IBW used for calculations as pt >120% of IBW (142%). Needs adjusted for hypermetabolic rate 2/2 COVID, critical care, advanced age.

## 2021-10-14 NOTE — DIETITIAN INITIAL EVALUATION ADULT. - OTHER INFO
85F with PMHx T2DM, HLD, HTN presented to [facility] ED on 10/9 with 1w Hx of generalized weakness, fatigue, SOB, decreased appetite, and cough in the setting of positive COVID PCR. Now with worsening hypoxemia i/s/o COVID PNA and new aspiration PNA. Pt transferred to MICU for worsening hypoxemia on HFNC 60/60 with PaO2 60 on ABG with worsening B/L infiltrates and CXR and new RML infiltrate. Likely 2/2 to new aspiration PNA after event on 10/12. 85F with PMHx T2DM, HLD, HTN presented to [facility] ED on 10/9 with 1w Hx of generalized weakness, fatigue, SOB, decreased appetite, and cough in the setting of positive COVID PCR. Now with worsening hypoxemia i/s/o COVID PNA and new aspiration PNA. Pt transferred to MICU for worsening hypoxemia on HFNC 60/60 with PaO2 60 on ABG with worsening B/L infiltrates and CXR and new RML infiltrate. Likely 2/2 to new aspiration PNA after event on 10/12.    On assessment, pt resting in bed, currently on COVID contact. Pt on DASH/ TLC CST CHO Renal diet at time of assessment, disc with team to liberalize and remove renal diet, team onboard. Pt has only been consuming vegetables and vegetable broth. Noted appetite has been slowly improving since admission.  No reported n/v/d/c. No abd distention or discomfort. Pt reported weight has been consistent, UBW ~170lbs, states it is hard for her to lose weight. Noted a decrease in appetite x1 week PTA, unable to provide diet recall. Noted to be following a mainly plant based diet. NPFE unremarkable. RD provided encouragement and noted importance of adequate PO intake, incorporating protein rich foods. Please see nutr recs below. RD to follow.

## 2021-10-14 NOTE — CONSULT NOTE ADULT - CONVERSATION DETAILS
Pts chart reviewed. Pleasant discussion held at bedside. Pt coping well with hospitalization. [employer] anup. Finds strength talking with her family. Pt , her daughter Ela Belle (#[phone]) is primary point of contact.     Dyspnea well controlled with HFNC, pt confirms she would want trial of intubation if necessary.     Dispo TBD. Pt lives alone, her adult children live nearby.

## 2021-10-14 NOTE — PROGRESS NOTE ADULT - ASSESSMENT
84 YO F with PMHx T2DM, HLD, HTN presented to [facility] ED on 10/9 with 1w Hx of generalized weakness, fatigue, SOB, decreased appetite, and cough in the setting of positive COVID PCR. Now with worsening hypoxemia i/s/o COVID PNA and new aspiration PNA     NEURO:  CORTES, AOx4    PULM:  #AHRF 2/2 COVID PNA and aspiration PNA  Pt with worsening hypoxemia on HFNC 60/60 with PaO2 60 on ABG with worsening B/L infiltrates and CXR and new RML infiltrate. Likely 2/2 to new aspiration PNA after event on 10/12.   - No increase WOB on current oxygen requirements  - c/w Vanc and Zosyn (renal dosing) for HAP  - MRSA swab  - f/u Sputum Cx  - C/w Decadron 6mg Q24h   - Trend CRP    CV:  #HTN  Pt home meds include amlodipine 10mg PO daily, metoprolol ER succinate 100mg PO daily and nifedipine 60mg ED daily  - c/w home amlodipine and metoprolol ER  - Can consider restarting nifedipine if BP remains elevated     GI:  #GI PPX  - C/w PPI while on decadron    ENDO:  #T2DM  Pt home meds include glimepiride 2mg BID and metformin 1g daily.  - A1c 7.1 (10/10)   - Currently at lantus 12U, lispro 5U pre-meal TID, adjust as needed   - moderate ISS  - consistent carb diet    ID:  #Aspiration PNA  Pt with new RML infiltrate on CXR along with worsening B/L infiltrates.   - Given duration in hospital will cover with vanc/zosyn as above  - Obtain MRSA swab  - F/u Bcx (10/11) - NGTD 3 days  - Surveillance BCx (10/13) - NGTD 1 day  - Procal 1.42 on admission, repeat Procal 0.42    #COVID  Patient is vaccinated. Sxs started on 10/2, Covid + on outpatient PCR 10/9. Likely infected by son.  - Management as above     #Asx bacteruria  No tx as this time      RENAL:  #JUDITH on possible CKD  Pt with no known hx of CKD but renal U/S showing chronic medical disease. Cr ~4.5 on admission s/p 12 hours of 100cc/hr LR pt with now improving Cr and GFR. Possible pre-renal in etiology vs. underlying covid infection. Currently urinating.   - Repeat BUN/Cr - 45/2.66, improving  - Continue to monitor  - Avoid nephrotoxic agents   - Strict I/Os     F: tolerating PO, no IVF  E: replete K<4, Mg<2  N: Dash/TLC/CC  VTE Prophylaxis: Subq heparin 5000q8  C: Full Code  D: MICU

## 2021-10-14 NOTE — CONSULT NOTE ADULT - ASSESSMENT
85y old  Female who presents with a chief complaint of COVID PNA. Palliative care triggered for evaluation in setting of advanced age, COVID+, lace 8.

## 2021-10-15 LAB
ALBUMIN SERPL ELPH-MCNC: 3.1 G/DL — LOW (ref 3.3–5)
ALP SERPL-CCNC: 43 U/L — SIGNIFICANT CHANGE UP (ref 40–120)
ALT FLD-CCNC: 10 U/L — SIGNIFICANT CHANGE UP (ref 10–45)
ANION GAP SERPL CALC-SCNC: 13 MMOL/L — SIGNIFICANT CHANGE UP (ref 5–17)
AST SERPL-CCNC: 13 U/L — SIGNIFICANT CHANGE UP (ref 10–40)
BASOPHILS # BLD AUTO: 0.05 K/UL — SIGNIFICANT CHANGE UP (ref 0–0.2)
BASOPHILS NFR BLD AUTO: 0.5 % — SIGNIFICANT CHANGE UP (ref 0–2)
BILIRUB SERPL-MCNC: 0.3 MG/DL — SIGNIFICANT CHANGE UP (ref 0.2–1.2)
BUN SERPL-MCNC: 44 MG/DL — HIGH (ref 7–23)
CALCIUM SERPL-MCNC: 8.5 MG/DL — SIGNIFICANT CHANGE UP (ref 8.4–10.5)
CHLORIDE SERPL-SCNC: 101 MMOL/L — SIGNIFICANT CHANGE UP (ref 96–108)
CO2 SERPL-SCNC: 21 MMOL/L — LOW (ref 22–31)
CREAT SERPL-MCNC: 2.45 MG/DL — HIGH (ref 0.5–1.3)
EOSINOPHIL # BLD AUTO: 0 K/UL — SIGNIFICANT CHANGE UP (ref 0–0.5)
EOSINOPHIL NFR BLD AUTO: 0 % — SIGNIFICANT CHANGE UP (ref 0–6)
GLUCOSE BLDC GLUCOMTR-MCNC: 139 MG/DL — HIGH (ref 70–99)
GLUCOSE BLDC GLUCOMTR-MCNC: 169 MG/DL — HIGH (ref 70–99)
GLUCOSE BLDC GLUCOMTR-MCNC: 224 MG/DL — HIGH (ref 70–99)
GLUCOSE BLDC GLUCOMTR-MCNC: 274 MG/DL — HIGH (ref 70–99)
GLUCOSE SERPL-MCNC: 264 MG/DL — HIGH (ref 70–99)
HCT VFR BLD CALC: 40.5 % — SIGNIFICANT CHANGE UP (ref 34.5–45)
HGB BLD-MCNC: 13.1 G/DL — SIGNIFICANT CHANGE UP (ref 11.5–15.5)
IMM GRANULOCYTES NFR BLD AUTO: 7.7 % — HIGH (ref 0–1.5)
LYMPHOCYTES # BLD AUTO: 1.28 K/UL — SIGNIFICANT CHANGE UP (ref 1–3.3)
LYMPHOCYTES # BLD AUTO: 13.1 % — SIGNIFICANT CHANGE UP (ref 13–44)
MAGNESIUM SERPL-MCNC: 2 MG/DL — SIGNIFICANT CHANGE UP (ref 1.6–2.6)
MCHC RBC-ENTMCNC: 28.3 PG — SIGNIFICANT CHANGE UP (ref 27–34)
MCHC RBC-ENTMCNC: 32.3 GM/DL — SIGNIFICANT CHANGE UP (ref 32–36)
MCV RBC AUTO: 87.5 FL — SIGNIFICANT CHANGE UP (ref 80–100)
MONOCYTES # BLD AUTO: 0.45 K/UL — SIGNIFICANT CHANGE UP (ref 0–0.9)
MONOCYTES NFR BLD AUTO: 4.6 % — SIGNIFICANT CHANGE UP (ref 2–14)
NEUTROPHILS # BLD AUTO: 7.24 K/UL — SIGNIFICANT CHANGE UP (ref 1.8–7.4)
NEUTROPHILS NFR BLD AUTO: 74.1 % — SIGNIFICANT CHANGE UP (ref 43–77)
NRBC # BLD: 0 /100 WBCS — SIGNIFICANT CHANGE UP (ref 0–0)
PHOSPHATE SERPL-MCNC: 3.3 MG/DL — SIGNIFICANT CHANGE UP (ref 2.5–4.5)
PLATELET # BLD AUTO: 299 K/UL — SIGNIFICANT CHANGE UP (ref 150–400)
POTASSIUM SERPL-MCNC: 4.8 MMOL/L — SIGNIFICANT CHANGE UP (ref 3.5–5.3)
POTASSIUM SERPL-SCNC: 4.8 MMOL/L — SIGNIFICANT CHANGE UP (ref 3.5–5.3)
PROT SERPL-MCNC: 6.7 G/DL — SIGNIFICANT CHANGE UP (ref 6–8.3)
RBC # BLD: 4.63 M/UL — SIGNIFICANT CHANGE UP (ref 3.8–5.2)
RBC # FLD: 13.5 % — SIGNIFICANT CHANGE UP (ref 10.3–14.5)
SODIUM SERPL-SCNC: 135 MMOL/L — SIGNIFICANT CHANGE UP (ref 135–145)
WBC # BLD: 9.77 K/UL — SIGNIFICANT CHANGE UP (ref 3.8–10.5)
WBC # FLD AUTO: 9.77 K/UL — SIGNIFICANT CHANGE UP (ref 3.8–10.5)

## 2021-10-15 PROCEDURE — 99291 CRITICAL CARE FIRST HOUR: CPT

## 2021-10-15 PROCEDURE — 71045 X-RAY EXAM CHEST 1 VIEW: CPT | Mod: 26

## 2021-10-15 RX ORDER — INSULIN LISPRO 100/ML
7 VIAL (ML) SUBCUTANEOUS
Refills: 0 | Status: DISCONTINUED | OUTPATIENT
Start: 2021-10-15 | End: 2021-10-16

## 2021-10-15 RX ADMIN — Medication 7 UNIT(S): at 11:50

## 2021-10-15 RX ADMIN — INSULIN GLARGINE 12 UNIT(S): 100 INJECTION, SOLUTION SUBCUTANEOUS at 22:50

## 2021-10-15 RX ADMIN — Medication 7 UNIT(S): at 19:31

## 2021-10-15 RX ADMIN — Medication 5 UNIT(S): at 07:14

## 2021-10-15 RX ADMIN — Medication 200 MILLIGRAM(S): at 19:30

## 2021-10-15 RX ADMIN — HEPARIN SODIUM 5000 UNIT(S): 5000 INJECTION INTRAVENOUS; SUBCUTANEOUS at 14:24

## 2021-10-15 RX ADMIN — AMLODIPINE BESYLATE 10 MILLIGRAM(S): 2.5 TABLET ORAL at 09:41

## 2021-10-15 RX ADMIN — HEPARIN SODIUM 5000 UNIT(S): 5000 INJECTION INTRAVENOUS; SUBCUTANEOUS at 05:23

## 2021-10-15 RX ADMIN — Medication 6: at 11:49

## 2021-10-15 RX ADMIN — Medication 6 MILLIGRAM(S): at 22:49

## 2021-10-15 RX ADMIN — PIPERACILLIN AND TAZOBACTAM 200 GRAM(S): 4; .5 INJECTION, POWDER, LYOPHILIZED, FOR SOLUTION INTRAVENOUS at 05:23

## 2021-10-15 RX ADMIN — Medication 100 MILLIGRAM(S): at 22:50

## 2021-10-15 RX ADMIN — Medication 200 MILLIGRAM(S): at 11:42

## 2021-10-15 RX ADMIN — Medication 2: at 17:32

## 2021-10-15 RX ADMIN — HEPARIN SODIUM 5000 UNIT(S): 5000 INJECTION INTRAVENOUS; SUBCUTANEOUS at 22:49

## 2021-10-15 RX ADMIN — Medication 200 MILLIGRAM(S): at 22:51

## 2021-10-15 RX ADMIN — PANTOPRAZOLE SODIUM 40 MILLIGRAM(S): 20 TABLET, DELAYED RELEASE ORAL at 07:15

## 2021-10-15 RX ADMIN — SIMVASTATIN 20 MILLIGRAM(S): 20 TABLET, FILM COATED ORAL at 22:50

## 2021-10-15 RX ADMIN — Medication 4: at 07:15

## 2021-10-15 RX ADMIN — CHLORHEXIDINE GLUCONATE 1 APPLICATION(S): 213 SOLUTION TOPICAL at 07:16

## 2021-10-15 RX ADMIN — Medication 200 MILLIGRAM(S): at 05:23

## 2021-10-15 NOTE — PROGRESS NOTE ADULT - ATTENDING COMMENTS
84 YO F with PMHx T2DM, HLD, HTN presented to [facility] ED on 10/9 with 1w Hx of generalized weakness, fatigue, SOB, decreased appetite, and cough in the setting of positive COVID PCR.    patient was admitted to MICU for worsening oxygen requirements. now stable and comfortable of high flow NC at 60%@ 60LPM. accepted to medical telemetry for down titration of oxygen as tolerated. continue decadron and repeat inflammatory markers. continue dvt ppx. control BP and blood sugar. monitor renal function and avoid nephrotoxic meds.

## 2021-10-15 NOTE — PROGRESS NOTE ADULT - ASSESSMENT
86 YO F with PMHx T2DM, HLD, HTN presented to [facility] ED on 10/9 with 1w Hx of generalized weakness, fatigue, SOB, decreased appetite, and cough in the setting of positive COVID PCR. Now with worsening hypoxemia i/s/o COVID PNA, now for stepdown to telemetry for further treatment.      NEURO:  CORTES, AOx4    PULM:  #AHRF 2/2 COVID PNA and possible aspiration PNA  Pt with worsening hypoxemia on HFNC 60/60 with PaO2 60 on ABG with worsening B/L infiltrates and CXR and new RML infiltrate. Patient without fever or leukocytosis while on antibiotics. Discontinued antibiotics at this time given these findings with lower suspicion of aspiration event.   - No increase WOB on current oxygen requirements and on stable HFNC 60/60  - C/w Decadron 6mg Q24h   - Trend CRP daily    CV:  #HTN  Pt home meds include amlodipine 10mg PO daily, metoprolol ER succinate 100mg PO daily and nifedipine 60mg ED daily  - c/w home amlodipine and metoprolol ER  - Can consider restarting nifedipine if BP remains elevated     GI:  #GI PPX  - C/w PPI while on decadron    ENDO:  #T2DM  Pt home meds include glimepiride 2mg BID and metformin 1g daily. Insulin needed while on steroids.  - A1c 7.1 (10/10)   - Currently at lantus 12U, lispro 5U pre-meal TID, adjust as needed   - moderate ISS  - consistent carb diet    ID:    #COVID  Patient is vaccinated. Sxs started on 10/2, Covid + on outpatient PCR 10/9.  - Management as above     #Asx bacteruria  No tx as this time      RENAL:  #JUDITH on possible CKD  Pt with no known hx of CKD but renal U/S showing chronic medical disease. Cr ~4.5 on admission s/p 12 hours of 100cc/hr LR pt with now improving Cr and GFR. Possible pre-renal in etiology vs. underlying covid infection. Currently urinating.   - Repeat BUN/Cr - 45/2.66, improving  - Continue to monitor  - Avoid nephrotoxic agents   - Strict I/Os     F: tolerating PO, no IVF  E: replete K<4, Mg<2  N: Dash/TLC/CC  VTE Prophylaxis: Subq heparin 5000q8  C: Full Code  D: stepdown to medicine telemetry

## 2021-10-15 NOTE — PROGRESS NOTE ADULT - SUBJECTIVE AND OBJECTIVE BOX
*INCOMPLETE*  ACCEPTANCE NOTE MICU TO [address] [place] [address] COURSE  84 YO F with PMHx T2DM, HLD, HTN presented to [facility] ED on 10/9 with 1w Hx of generalized weakness, fatigue, SOB, decreased appetite, and cough in the setting of positive COVID PCR as outpatient. Patient reports no recent travel however has been in contact with her son who recently tested COVID positive 1 week prior. Pt saw her PMD (Dr. Knight [phone]) who reportedly told her to take mucinex and prescribed erythromycin for symptoms 3d ago. Pt was notified today that COVID test came back positive, told to come to ED. Of note, patient received both doses of the Pfizer COVID vaccine in Jan/Feb. Pt received flu shot in September. Patient was stepped down from 7L on 10/12 on 6L NC saturating in the low 90s. Late 10/12 the patient had an aspiration event when she vomited. At that time she desatted to 88% and was placed on NRB then escalated to HFNC 60/60 with saturations in the low 90s. On the AM of 10/13 the patient started desaturating to the low to mid 80s finally settling to 86% after self-proning for an hour. On 10/13 pt noted w/ increased WOB and persistent hypoxia <88% on HFNC 60/60 despite proning. ICU was consulted, ABG showed pO2 60, CXR showed significant worsening multilobar infiltrates c/w aspiration PNA superimposed on covid PNA, so decision was made to step up to 7Ea/MICU for further management of AHRF 2/2 COVID PNA. Patient clinically stable while in MICU and required no pressors or mechanical ventilation. Saturating well on 60/60 and stable for stepdown to telemetry for further evaluation and treatment.    SUBJECTIVE / INTERVAL HPI: Patient seen and examined at bedside.     MEDICATIONS  (STANDING):  amLODIPine   Tablet 10 milliGRAM(s) Oral every 24 hours  chlorhexidine 2% Cloths 1 Application(s) Topical <User Schedule>  dexAMETHasone  Injectable 6 milliGRAM(s) IV Push every 24 hours  dextrose 40% Gel 15 Gram(s) Oral once  dextrose 5%. 1000 milliLiter(s) (50 mL/Hr) IV Continuous <Continuous>  dextrose 5%. 1000 milliLiter(s) (100 mL/Hr) IV Continuous <Continuous>  dextrose 50% Injectable 25 Gram(s) IV Push once  dextrose 50% Injectable 12.5 Gram(s) IV Push once  dextrose 50% Injectable 25 Gram(s) IV Push once  glucagon  Injectable 1 milliGRAM(s) IntraMuscular once  guaiFENesin Oral Liquid (Sugar-Free) 200 milliGRAM(s) Oral every 6 hours  heparin   Injectable 5000 Unit(s) SubCutaneous every 8 hours  insulin glargine Injectable (LANTUS) 12 Unit(s) SubCutaneous at bedtime  insulin lispro (ADMELOG) corrective regimen sliding scale   SubCutaneous Before meals and at bedtime  insulin lispro Injectable (ADMELOG) 7 Unit(s) SubCutaneous three times a day before meals  metoprolol succinate  milliGRAM(s) Oral every 24 hours  pantoprazole    Tablet 40 milliGRAM(s) Oral before breakfast  simvastatin 20 milliGRAM(s) Oral at bedtime    MEDICATIONS  (PRN):  acetaminophen   Tablet .. 650 milliGRAM(s) Oral every 6 hours PRN Temp greater or equal to 38C (100.4F), Mild Pain (1 - 3)  benzocaine 15 mG/menthol 3.6 mG Lozenge 1 Lozenge Oral every 4 hours PRN Sore Throat    Allergies    Allergy Status Unknown    Intolerances        VITAL SIGNS:  Vital Signs Last 24 Hrs  T(C): 37.3 (15 Oct 2021 18:00), Max: 37.3 (15 Oct 2021 18:00)  T(F): 99.1 (15 Oct 2021 18:00), Max: 99.1 (15 Oct 2021 18:00)  HR: 68 (15 Oct 2021 18:00) (49 - 83)  BP: 132/90 (15 Oct 2021 18:00) (127/67 - 176/84)  BP(mean): 108 (15 Oct 2021 18:00) (91 - 126)  RR: 33 (15 Oct 2021 18:00) (18 - 48)  SpO2: 98% (15 Oct 2021 18:00) (88% - 98%)      10-14-21 @ 07:01  -  10-15-21 @ 07:00  --------------------------------------------------------  IN: 800 mL / OUT: 1800 mL / NET: -1000 mL        PHYSICAL EXAM:  Constitutional: NAD  HEENT: HFNC in place, MMM  Neck: supple, no JVD  Cardiovascular: +S1/S2, RRR  Respiratory: CTA B/L, no W/R/R  Gastrointestinal: abdomen soft, nontender, no masses  Genitourinary: no suprapubic tenderness or fullness  Extremities: WWP; no LE edema; no clubbing or cyanosis/L  Dermatologic: normal color and turgor; no visible rashes  Neurological: AAOx3      LABS:                        13.1   9.77  )-----------( 299      ( 15 Oct 2021 07:13 )             40.5     10-15    135  |  101  |  44<H>  ----------------------------<  264<H>  4.8   |  21<L>  |  2.45<H>    Ca    8.5      15 Oct 2021 07:13  Phos  3.3     10-15  Mg     2.0     10-15    TPro  6.7  /  Alb  3.1<L>  /  TBili  0.3  /  DBili  x   /  AST  13  /  ALT  10  /  AlkPhos  43  10-15        CAPILLARY BLOOD GLUCOSE      POCT Blood Glucose.: 169 mg/dL (15 Oct 2021 17:07)  POCT Blood Glucose.: 274 mg/dL (15 Oct 2021 11:39)  POCT Blood Glucose.: 224 mg/dL (15 Oct 2021 06:30)  POCT Blood Glucose.: 171 mg/dL (14 Oct 2021 21:20)          RADIOLOGY & ADDITIONAL TESTS: Reviewed.   ACCEPTANCE NOTE MICU TO [address] [place] [address] COURSE  86 YO F with PMHx T2DM, HLD, HTN presented to [facility] ED on 10/9 with 1w Hx of generalized weakness, fatigue, SOB, decreased appetite, and cough in the setting of positive COVID PCR as outpatient. Patient reports no recent travel however has been in contact with her son who recently tested COVID positive 1 week prior. Pt saw her PMD (Dr. Knight [phone]) who reportedly told her to take mucinex and prescribed erythromycin for symptoms 3d ago. Pt was notified today that COVID test came back positive, told to come to ED. Of note, patient received both doses of the Pfizer COVID vaccine in Jan/Feb. Pt received flu shot in September. Patient was stepped down from 7L on 10/12 on 6L NC saturating in the low 90s. Late 10/12 the patient had an aspiration event when she vomited. At that time she desatted to 88% and was placed on NRB then escalated to HFNC 60/60 with saturations in the low 90s. On the AM of 10/13 the patient started desaturating to the low to mid 80s finally settling to 86% after self-proning for an hour. On 10/13 pt noted w/ increased WOB and persistent hypoxia <88% on HFNC 60/60 despite proning. ICU was consulted, ABG showed pO2 60, CXR showed significant worsening multilobar infiltrates c/w aspiration PNA superimposed on covid PNA, so decision was made to step up to 7Ea/MICU for further management of AHRF 2/2 COVID PNA. Patient clinically stable while in MICU and required no pressors or mechanical ventilation. Saturating well on 60/60 and stable for stepdown to telemetry for further evaluation and treatment.    SUBJECTIVE / INTERVAL HPI: Patient seen and examined at bedside. Pt reports improvement in her breathing. She reports continued cough. I acknowledged and told her I would prescribe losanges. She complains about the food she is receiving from the kitchen. I told her she is diabetic and has HTN, and her diet is in place in accordance to her comorbidities. She understood my explanation. She denies F/C, CP, abd pain, N/V/D/C, HA, tremors.     MEDICATIONS  (STANDING):  amLODIPine   Tablet 10 milliGRAM(s) Oral every 24 hours  chlorhexidine 2% Cloths 1 Application(s) Topical <User Schedule>  dexAMETHasone  Injectable 6 milliGRAM(s) IV Push every 24 hours  dextrose 40% Gel 15 Gram(s) Oral once  dextrose 5%. 1000 milliLiter(s) (50 mL/Hr) IV Continuous <Continuous>  dextrose 5%. 1000 milliLiter(s) (100 mL/Hr) IV Continuous <Continuous>  dextrose 50% Injectable 25 Gram(s) IV Push once  dextrose 50% Injectable 12.5 Gram(s) IV Push once  dextrose 50% Injectable 25 Gram(s) IV Push once  glucagon  Injectable 1 milliGRAM(s) IntraMuscular once  guaiFENesin Oral Liquid (Sugar-Free) 200 milliGRAM(s) Oral every 6 hours  heparin   Injectable 5000 Unit(s) SubCutaneous every 8 hours  insulin glargine Injectable (LANTUS) 12 Unit(s) SubCutaneous at bedtime  insulin lispro (ADMELOG) corrective regimen sliding scale   SubCutaneous Before meals and at bedtime  insulin lispro Injectable (ADMELOG) 7 Unit(s) SubCutaneous three times a day before meals  metoprolol succinate  milliGRAM(s) Oral every 24 hours  pantoprazole    Tablet 40 milliGRAM(s) Oral before breakfast  simvastatin 20 milliGRAM(s) Oral at bedtime    MEDICATIONS  (PRN):  acetaminophen   Tablet .. 650 milliGRAM(s) Oral every 6 hours PRN Temp greater or equal to 38C (100.4F), Mild Pain (1 - 3)  benzocaine 15 mG/menthol 3.6 mG Lozenge 1 Lozenge Oral every 4 hours PRN Sore Throat    Allergies    Allergy Status Unknown    Intolerances        VITAL SIGNS:  Vital Signs Last 24 Hrs  T(C): 37.3 (15 Oct 2021 18:00), Max: 37.3 (15 Oct 2021 18:00)  T(F): 99.1 (15 Oct 2021 18:00), Max: 99.1 (15 Oct 2021 18:00)  HR: 68 (15 Oct 2021 18:00) (49 - 83)  BP: 132/90 (15 Oct 2021 18:00) (127/67 - 176/84)  BP(mean): 108 (15 Oct 2021 18:00) (91 - 126)  RR: 33 (15 Oct 2021 18:00) (18 - 48)  SpO2: 98% (15 Oct 2021 18:00) (88% - 98%)      10-14-21 @ 07:01  -  10-15-21 @ 07:00  --------------------------------------------------------  IN: 800 mL / OUT: 1800 mL / NET: -1000 mL        PHYSICAL EXAM:  Constitutional: NAD  HEENT: HFNC in place, MMM  Neck: supple, no JVD  Cardiovascular: +S1/S2, RRR  Respiratory: CTA B/L, no W/R/R  Gastrointestinal: abdomen soft, nontender, no masses  Genitourinary: no suprapubic tenderness or fullness  Extremities: WWP; no LE edema; no clubbing or cyanosis/L  Dermatologic: normal color and turgor; no visible rashes  Neurological: AAOx3      LABS:                        13.1   9.77  )-----------( 299      ( 15 Oct 2021 07:13 )             40.5     10-15    135  |  101  |  44<H>  ----------------------------<  264<H>  4.8   |  21<L>  |  2.45<H>    Ca    8.5      15 Oct 2021 07:13  Phos  3.3     10-15  Mg     2.0     10-15    TPro  6.7  /  Alb  3.1<L>  /  TBili  0.3  /  DBili  x   /  AST  13  /  ALT  10  /  AlkPhos  43  10-15        CAPILLARY BLOOD GLUCOSE      POCT Blood Glucose.: 169 mg/dL (15 Oct 2021 17:07)  POCT Blood Glucose.: 274 mg/dL (15 Oct 2021 11:39)  POCT Blood Glucose.: 224 mg/dL (15 Oct 2021 06:30)  POCT Blood Glucose.: 171 mg/dL (14 Oct 2021 21:20)          RADIOLOGY & ADDITIONAL TESTS: Reviewed.

## 2021-10-15 NOTE — PROGRESS NOTE ADULT - ASSESSMENT
84 YO F with PMHx T2DM, HLD, HTN presented to [facility] ED on 10/9 with 1w Hx of generalized weakness, fatigue, SOB, decreased appetite, and cough in the setting of positive COVID PCR. Now with worsening hypoxemia i/s/o COVID PNA, now for stepdown to telemetry for further treatment.      NEURO  CORTES, AOx4    PULMOMARY  #AHRF 2/2 COVID PNA and possible aspiration PNA  Pt with worsening hypoxemia on HFNC 60/60 with PaO2 60 on ABG with worsening B/L infiltrates and CXR and new RML infiltrate. Patient without fever or leukocytosis while on antibiotics. Discontinued antibiotics at this time given these findings with lower suspicion of aspiration event.   - No increase WOB on current oxygen requirements and on stable HFNC 60/60  - C/w Decadron 6mg Q24h   - Trend CRP daily    CARDIOVASCULAR  #HTN  Pt home meds include amlodipine 10mg PO daily, metoprolol ER succinate 100mg PO daily and nifedipine 60mg ED daily  - c/w home amlodipine and metoprolol ER  - Can consider restarting nifedipine if BP remains elevated     GI  #GI PPX  - C/w PPI while on decadron    ENDO  #T2DM  Pt home meds include glimepiride 2mg BID and metformin 1g daily. Insulin needed while on steroids.  - A1c 7.1 (10/10)   - Currently at lantus 12U, lispro 5U pre-meal TID, adjust as needed   - moderate ISS  - consistent carb diet    ID  #COVID  Patient is vaccinated. Sxs started on 10/2, Covid + on outpatient PCR 10/9.  - Management as above     #Asx bacteruria  No tx as this time      RENAL  #JUDITH on possible CKD  Pt with no known hx of CKD but renal U/S showing chronic medical disease. Cr ~4.5 on admission s/p 12 hours of 100cc/hr LR pt with now improving Cr and GFR. Possible pre-renal in etiology vs. underlying covid infection. Currently urinating.   - Repeat BUN/Cr - 45/2.66, improving  - Continue to monitor  - Avoid nephrotoxic agents   - Strict I/Os     Prophylactic measures  F: tolerating PO, no IVF  E: replete K<4, Mg<2  N: Dash/TLC/CC  VTE Prophylaxis: Subq heparin 5000q8  C: Full Code  D: 7-Lach   84 YO F with PMHx T2DM, HLD, HTN presented to [facility] ED on 10/9 with 1w Hx of generalized weakness, fatigue, SOB, decreased appetite, and cough in the setting of positive COVID PCR. Now with worsening hypoxemia i/s/o COVID PNA, now for stepdown to telemetry for further treatment.      NEURO  CORTES, AOx4    PULMOMARY  #AHRF 2/2 COVID PNA and possible aspiration PNA  Pt with worsening hypoxemia on HFNC 60/60 with PaO2 60 on ABG with worsening B/L infiltrates and CXR and new RML infiltrate. Patient without fever or leukocytosis while on antibiotics. Discontinued antibiotics at this time given these findings with lower suspicion of aspiration event.   - No increase WOB on current oxygen requirements and on stable HFNC 60/60  - C/w Decadron 6mg Q24h   - Trend CRP daily    CARDIOVASCULAR  #HTN  Pt home meds include amlodipine 10mg PO daily, metoprolol ER succinate 100mg PO daily and nifedipine 60mg ED daily  - c/w home amlodipine and metoprolol ER  - Can consider restarting nifedipine if BP remains elevated     GI  #GI PPX  - C/w PPI while on decadron    ENDO  #T2DM  Pt home meds include glimepiride 2mg BID and metformin 1g daily. Insulin needed while on steroids.  - A1c 7.1 (10/10)   - Currently at lantus 12U, lispro 5U pre-meal TID, adjust as needed   - moderate ISS  - consistent carb diet    ID  #COVID  Patient is vaccinated. Sxs started on 10/2, Covid + on outpatient PCR 10/9.  - Management as above     #Asx bacteruria  No tx as this time      RENAL  #JUDITH on possible CKD  Pt with no known hx of CKD but renal U/S showing chronic medical disease. Cr ~4.5 on admission s/p 12 hours of 100cc/hr LR pt with now improving Cr and GFR. Possible pre-renal in etiology vs. underlying covid infection. Currently urinating.   - Repeat BUN/Cr - 45/2.66, improving  - Continue to monitor  - Avoid nephrotoxic agents   - Strict I/Os     Prophylactic measures  F: tolerating PO, no IVF  E: replete K<4, Mg<2  N: Dash/TLC/CC  VTE Prophylaxis: Subq heparin 5000q8  C: Full Code  D: 7-Lach

## 2021-10-15 NOTE — PROGRESS NOTE ADULT - SUBJECTIVE AND OBJECTIVE BOX
** INCOMPLETE **     INTERVAL HPI/OVERNIGHT EVENTS:    SUBJECTIVE: Patient seen and examined at bedside.    VITAL SIGNS:  ICU Vital Signs Last 24 Hrs  T(C): 36.4 (15 Oct 2021 09:48), Max: 36.5 (14 Oct 2021 18:41)  T(F): 97.6 (15 Oct 2021 09:48), Max: 97.7 (14 Oct 2021 18:41)  HR: 53 (15 Oct 2021 10:00) (49 - 101)  BP: 147/67 (15 Oct 2021 10:00) (115/68 - 182/92)  BP(mean): 96 (15 Oct 2021 10:00) (87 - 126)  ABP: --  ABP(mean): --  RR: 29 (15 Oct 2021 10:00) (18 - 40)  SpO2: 94% (15 Oct 2021 10:00) (89% - 95%)        10-14 @ 07:01  -  10-15 @ 07:00  --------------------------------------------------------  IN: 800 mL / OUT: 1800 mL / NET: -1000 mL      CAPILLARY BLOOD GLUCOSE      POCT Blood Glucose.: 224 mg/dL (15 Oct 2021 06:30)      PHYSICAL EXAM:    Constitutional: NAD  HEENT: NC/AT; PERRL, anicteric sclera; MMM  Neck: supple, no JVD  Cardiovascular: +S1/S2, RRR  Respiratory: CTA B/L, no W/R/R  Gastrointestinal: abdomen soft, NT/ND; no rebound or guarding; +BSx4  Genitourinary: no suprapubic tenderness or fullness  Extremities: WWP; no LE edema; no clubbing or cyanosis  Vascular: 2+ radial, DP/PT and femoral pulses B/L  Dermatologic: normal color and turgor; no visible rashes  Neurological:     MEDICATIONS:  MEDICATIONS  (STANDING):  amLODIPine   Tablet 10 milliGRAM(s) Oral every 24 hours  chlorhexidine 2% Cloths 1 Application(s) Topical <User Schedule>  dexAMETHasone  Injectable 6 milliGRAM(s) IV Push every 24 hours  dextrose 40% Gel 15 Gram(s) Oral once  dextrose 5%. 1000 milliLiter(s) (50 mL/Hr) IV Continuous <Continuous>  dextrose 5%. 1000 milliLiter(s) (100 mL/Hr) IV Continuous <Continuous>  dextrose 50% Injectable 25 Gram(s) IV Push once  dextrose 50% Injectable 12.5 Gram(s) IV Push once  dextrose 50% Injectable 25 Gram(s) IV Push once  glucagon  Injectable 1 milliGRAM(s) IntraMuscular once  guaiFENesin Oral Liquid (Sugar-Free) 200 milliGRAM(s) Oral every 6 hours  heparin   Injectable 5000 Unit(s) SubCutaneous every 8 hours  insulin glargine Injectable (LANTUS) 12 Unit(s) SubCutaneous at bedtime  insulin lispro (ADMELOG) corrective regimen sliding scale   SubCutaneous Before meals and at bedtime  insulin lispro Injectable (ADMELOG) 7 Unit(s) SubCutaneous three times a day before meals  metoprolol succinate  milliGRAM(s) Oral every 24 hours  pantoprazole    Tablet 40 milliGRAM(s) Oral before breakfast  simvastatin 20 milliGRAM(s) Oral at bedtime    MEDICATIONS  (PRN):  acetaminophen   Tablet .. 650 milliGRAM(s) Oral every 6 hours PRN Temp greater or equal to 38C (100.4F), Mild Pain (1 - 3)  benzocaine 15 mG/menthol 3.6 mG Lozenge 1 Lozenge Oral every 4 hours PRN Sore Throat      ALLERGIES:  Allergies    Allergy Status Unknown    Intolerances        LABS:                        13.1   9.77  )-----------( 299      ( 15 Oct 2021 07:13 )             40.5     10-15    135  |  101  |  44<H>  ----------------------------<  264<H>  4.8   |  21<L>  |  2.45<H>    Ca    8.5      15 Oct 2021 07:13  Phos  3.3     10-15  Mg     2.0     10-15    TPro  6.7  /  Alb  3.1<L>  /  TBili  0.3  /  DBili  x   /  AST  13  /  ALT  10  /  AlkPhos  43  10-15          RADIOLOGY & ADDITIONAL TESTS: Reviewed. HPI  86 YO F with PMHx T2DM, HLD, HTN presented to [facility] ED on 10/9 with 1w Hx of generalized weakness, fatigue, SOB, decreased appetite, and cough in the setting of positive COVID PCR as outpatient. Patient reports no recent travel however has been in contact with her son who recently tested COVID positive 1 week prior. Pt saw her PMD (Dr. Knight [phone]) who reportedly told her to take mucinex and prescribed erythromycin for symptoms 3d ago. Pt was notified today that COVID test came back positive, told to come to ED. Of note, patient received both doses of the Pfizer COVID vaccine in Jan/Feb. Pt received flu shot in September. Patient was stepped down from 7L on 10/12 on 6L NC saturating in the low 90s. Late 10/12 the patient had an aspiration event when she vomited. At that time she desatted to 88% and was placed on NRB then escalated to HFNC 60/60 with saturations in the low 90s. On the AM of 10/13 the patient started desaturating to the low to mid 80s finally settling to 86% after self-proning for an hour. On 10/13 pt noted w/ increased WOB and persistent hypoxia <88% on HFNC 60/60 despite proning. ICU was consulted, ABG showed pO2 60, CXR showed significant worsening multilobar infiltrates c/w aspiration PNA superimposed on covid PNA, so decision was made to step up to 7Ea/MICU for further management of AHRF 2/2 COVID PNA. Patient clinically stable while in MICU and required no pressors or mechanical ventilation. Saturating well on 60/60 and stable for stepdown to telemetry for further evaluation and treatment.    SUBJECTIVE: Patient seen and examined at bedside. Reports she feels significantly better and is without complaint at time of exam. ROS otherwise negative.     VITAL SIGNS:  ICU Vital Signs Last 24 Hrs  T(C): 36.4 (15 Oct 2021 09:48), Max: 36.5 (14 Oct 2021 18:41)  T(F): 97.6 (15 Oct 2021 09:48), Max: 97.7 (14 Oct 2021 18:41)  HR: 53 (15 Oct 2021 10:00) (49 - 101)  BP: 147/67 (15 Oct 2021 10:00) (115/68 - 182/92)  BP(mean): 96 (15 Oct 2021 10:00) (87 - 126)  RR: 29 (15 Oct 2021 10:00) (18 - 40)  SpO2: 94% (15 Oct 2021 10:00) (89% - 95%)        10-14 @ 07:01  -  10-15 @ 07:00  --------------------------------------------------------  IN: 800 mL / OUT: 1800 mL / NET: -1000 mL      CAPILLARY BLOOD GLUCOSE    POCT Blood Glucose.: 224 mg/dL (15 Oct 2021 06:30)      PHYSICAL EXAM:  Constitutional: NAD  HEENT: HFNC in place, MMM  Neck: supple, no JVD  Cardiovascular: +S1/S2, RRR  Respiratory: CTA B/L, no W/R/R  Gastrointestinal: abdomen soft, nontender, no masses  Genitourinary: no suprapubic tenderness or fullness  Extremities: WWP; no LE edema; no clubbing or cyanosis/L  Dermatologic: normal color and turgor; no visible rashes  Neurological: AAOx3    MEDICATIONS:  MEDICATIONS  (STANDING):  amLODIPine   Tablet 10 milliGRAM(s) Oral every 24 hours  chlorhexidine 2% Cloths 1 Application(s) Topical <User Schedule>  dexAMETHasone  Injectable 6 milliGRAM(s) IV Push every 24 hours  dextrose 40% Gel 15 Gram(s) Oral once  dextrose 5%. 1000 milliLiter(s) (50 mL/Hr) IV Continuous <Continuous>  dextrose 5%. 1000 milliLiter(s) (100 mL/Hr) IV Continuous <Continuous>  dextrose 50% Injectable 25 Gram(s) IV Push once  dextrose 50% Injectable 12.5 Gram(s) IV Push once  dextrose 50% Injectable 25 Gram(s) IV Push once  glucagon  Injectable 1 milliGRAM(s) IntraMuscular once  guaiFENesin Oral Liquid (Sugar-Free) 200 milliGRAM(s) Oral every 6 hours  heparin   Injectable 5000 Unit(s) SubCutaneous every 8 hours  insulin glargine Injectable (LANTUS) 12 Unit(s) SubCutaneous at bedtime  insulin lispro (ADMELOG) corrective regimen sliding scale   SubCutaneous Before meals and at bedtime  insulin lispro Injectable (ADMELOG) 7 Unit(s) SubCutaneous three times a day before meals  metoprolol succinate  milliGRAM(s) Oral every 24 hours  pantoprazole    Tablet 40 milliGRAM(s) Oral before breakfast  simvastatin 20 milliGRAM(s) Oral at bedtime    MEDICATIONS  (PRN):  acetaminophen   Tablet .. 650 milliGRAM(s) Oral every 6 hours PRN Temp greater or equal to 38C (100.4F), Mild Pain (1 - 3)  benzocaine 15 mG/menthol 3.6 mG Lozenge 1 Lozenge Oral every 4 hours PRN Sore Throat      ALLERGIES:  Allergies    Allergy Status Unknown    Intolerances        LABS:                        13.1   9.77  )-----------( 299      ( 15 Oct 2021 07:13 )             40.5     10-15    135  |  101  |  44<H>  ----------------------------<  264<H>  4.8   |  21<L>  |  2.45<H>    Ca    8.5      15 Oct 2021 07:13  Phos  3.3     10-15  Mg     2.0     10-15    TPro  6.7  /  Alb  3.1<L>  /  TBili  0.3  /  DBili  x   /  AST  13  /  ALT  10  /  AlkPhos  43  10-15          RADIOLOGY & ADDITIONAL TESTS: Reviewed.

## 2021-10-16 LAB
ALBUMIN SERPL ELPH-MCNC: 2.9 G/DL — LOW (ref 3.3–5)
ALP SERPL-CCNC: 45 U/L — SIGNIFICANT CHANGE UP (ref 40–120)
ALT FLD-CCNC: 10 U/L — SIGNIFICANT CHANGE UP (ref 10–45)
ANION GAP SERPL CALC-SCNC: 12 MMOL/L — SIGNIFICANT CHANGE UP (ref 5–17)
AST SERPL-CCNC: 11 U/L — SIGNIFICANT CHANGE UP (ref 10–40)
BASOPHILS # BLD AUTO: 0 K/UL — SIGNIFICANT CHANGE UP (ref 0–0.2)
BASOPHILS NFR BLD AUTO: 0 % — SIGNIFICANT CHANGE UP (ref 0–2)
BILIRUB SERPL-MCNC: 0.3 MG/DL — SIGNIFICANT CHANGE UP (ref 0.2–1.2)
BUN SERPL-MCNC: 47 MG/DL — HIGH (ref 7–23)
BURR CELLS BLD QL SMEAR: PRESENT — SIGNIFICANT CHANGE UP
CALCIUM SERPL-MCNC: 8.8 MG/DL — SIGNIFICANT CHANGE UP (ref 8.4–10.5)
CHLORIDE SERPL-SCNC: 103 MMOL/L — SIGNIFICANT CHANGE UP (ref 96–108)
CO2 SERPL-SCNC: 20 MMOL/L — LOW (ref 22–31)
CREAT SERPL-MCNC: 2.34 MG/DL — HIGH (ref 0.5–1.3)
CULTURE RESULTS: SIGNIFICANT CHANGE UP
EOSINOPHIL # BLD AUTO: 0 K/UL — SIGNIFICANT CHANGE UP (ref 0–0.5)
EOSINOPHIL NFR BLD AUTO: 0 % — SIGNIFICANT CHANGE UP (ref 0–6)
GIANT PLATELETS BLD QL SMEAR: PRESENT — SIGNIFICANT CHANGE UP
GLUCOSE BLDC GLUCOMTR-MCNC: 132 MG/DL — HIGH (ref 70–99)
GLUCOSE BLDC GLUCOMTR-MCNC: 191 MG/DL — HIGH (ref 70–99)
GLUCOSE BLDC GLUCOMTR-MCNC: 231 MG/DL — HIGH (ref 70–99)
GLUCOSE BLDC GLUCOMTR-MCNC: 277 MG/DL — HIGH (ref 70–99)
GLUCOSE SERPL-MCNC: 272 MG/DL — HIGH (ref 70–99)
HCT VFR BLD CALC: 40.8 % — SIGNIFICANT CHANGE UP (ref 34.5–45)
HGB BLD-MCNC: 13.5 G/DL — SIGNIFICANT CHANGE UP (ref 11.5–15.5)
LYMPHOCYTES # BLD AUTO: 0.55 K/UL — LOW (ref 1–3.3)
LYMPHOCYTES # BLD AUTO: 4.4 % — LOW (ref 13–44)
MAGNESIUM SERPL-MCNC: 1.9 MG/DL — SIGNIFICANT CHANGE UP (ref 1.6–2.6)
MANUAL SMEAR VERIFICATION: SIGNIFICANT CHANGE UP
MCHC RBC-ENTMCNC: 29.1 PG — SIGNIFICANT CHANGE UP (ref 27–34)
MCHC RBC-ENTMCNC: 33.1 GM/DL — SIGNIFICANT CHANGE UP (ref 32–36)
MCV RBC AUTO: 87.9 FL — SIGNIFICANT CHANGE UP (ref 80–100)
METAMYELOCYTES # FLD: 1.8 % — HIGH (ref 0–0)
MONOCYTES # BLD AUTO: 0.21 K/UL — SIGNIFICANT CHANGE UP (ref 0–0.9)
MONOCYTES NFR BLD AUTO: 1.7 % — LOW (ref 2–14)
MYELOCYTES NFR BLD: 0.9 % — HIGH (ref 0–0)
NEUTROPHILS # BLD AUTO: 11.47 K/UL — HIGH (ref 1.8–7.4)
NEUTROPHILS NFR BLD AUTO: 88.6 % — HIGH (ref 43–77)
NEUTS BAND # BLD: 2.6 % — SIGNIFICANT CHANGE UP (ref 0–8)
PHOSPHATE SERPL-MCNC: 3.1 MG/DL — SIGNIFICANT CHANGE UP (ref 2.5–4.5)
PLAT MORPH BLD: ABNORMAL
PLATELET # BLD AUTO: 355 K/UL — SIGNIFICANT CHANGE UP (ref 150–400)
POIKILOCYTOSIS BLD QL AUTO: SLIGHT — SIGNIFICANT CHANGE UP
POLYCHROMASIA BLD QL SMEAR: SLIGHT — SIGNIFICANT CHANGE UP
POTASSIUM SERPL-MCNC: 5.1 MMOL/L — SIGNIFICANT CHANGE UP (ref 3.5–5.3)
POTASSIUM SERPL-SCNC: 5.1 MMOL/L — SIGNIFICANT CHANGE UP (ref 3.5–5.3)
PROT SERPL-MCNC: 6.8 G/DL — SIGNIFICANT CHANGE UP (ref 6–8.3)
RBC # BLD: 4.64 M/UL — SIGNIFICANT CHANGE UP (ref 3.8–5.2)
RBC # FLD: 13.4 % — SIGNIFICANT CHANGE UP (ref 10.3–14.5)
RBC BLD AUTO: ABNORMAL
SODIUM SERPL-SCNC: 135 MMOL/L — SIGNIFICANT CHANGE UP (ref 135–145)
SPECIMEN SOURCE: SIGNIFICANT CHANGE UP
WBC # BLD: 12.58 K/UL — HIGH (ref 3.8–10.5)
WBC # FLD AUTO: 12.58 K/UL — HIGH (ref 3.8–10.5)

## 2021-10-16 PROCEDURE — 99233 SBSQ HOSP IP/OBS HIGH 50: CPT | Mod: GC

## 2021-10-16 RX ORDER — INSULIN GLARGINE 100 [IU]/ML
24 INJECTION, SOLUTION SUBCUTANEOUS AT BEDTIME
Refills: 0 | Status: DISCONTINUED | OUTPATIENT
Start: 2021-10-16 | End: 2021-10-18

## 2021-10-16 RX ORDER — INSULIN LISPRO 100/ML
8 VIAL (ML) SUBCUTANEOUS
Refills: 0 | Status: DISCONTINUED | OUTPATIENT
Start: 2021-10-16 | End: 2021-10-18

## 2021-10-16 RX ORDER — NIFEDIPINE 30 MG
60 TABLET, EXTENDED RELEASE 24 HR ORAL EVERY 24 HOURS
Refills: 0 | Status: DISCONTINUED | OUTPATIENT
Start: 2021-10-16 | End: 2021-10-21

## 2021-10-16 RX ADMIN — Medication 60 MILLIGRAM(S): at 11:35

## 2021-10-16 RX ADMIN — BENZOCAINE AND MENTHOL 1 LOZENGE: 5; 1 LIQUID ORAL at 22:22

## 2021-10-16 RX ADMIN — Medication 2: at 17:21

## 2021-10-16 RX ADMIN — Medication 200 MILLIGRAM(S): at 11:35

## 2021-10-16 RX ADMIN — Medication 7 UNIT(S): at 07:09

## 2021-10-16 RX ADMIN — INSULIN GLARGINE 24 UNIT(S): 100 INJECTION, SOLUTION SUBCUTANEOUS at 22:21

## 2021-10-16 RX ADMIN — Medication 6: at 12:50

## 2021-10-16 RX ADMIN — Medication 4: at 07:08

## 2021-10-16 RX ADMIN — Medication 6 MILLIGRAM(S): at 21:41

## 2021-10-16 RX ADMIN — Medication 8 UNIT(S): at 17:21

## 2021-10-16 RX ADMIN — Medication 200 MILLIGRAM(S): at 17:11

## 2021-10-16 RX ADMIN — HEPARIN SODIUM 5000 UNIT(S): 5000 INJECTION INTRAVENOUS; SUBCUTANEOUS at 21:41

## 2021-10-16 RX ADMIN — SIMVASTATIN 20 MILLIGRAM(S): 20 TABLET, FILM COATED ORAL at 21:41

## 2021-10-16 RX ADMIN — Medication 100 MILLIGRAM(S): at 21:41

## 2021-10-16 RX ADMIN — Medication 200 MILLIGRAM(S): at 07:10

## 2021-10-16 RX ADMIN — CHLORHEXIDINE GLUCONATE 1 APPLICATION(S): 213 SOLUTION TOPICAL at 06:40

## 2021-10-16 RX ADMIN — AMLODIPINE BESYLATE 10 MILLIGRAM(S): 2.5 TABLET ORAL at 07:10

## 2021-10-16 RX ADMIN — HEPARIN SODIUM 5000 UNIT(S): 5000 INJECTION INTRAVENOUS; SUBCUTANEOUS at 07:10

## 2021-10-16 RX ADMIN — Medication 8 UNIT(S): at 12:51

## 2021-10-16 RX ADMIN — PANTOPRAZOLE SODIUM 40 MILLIGRAM(S): 20 TABLET, DELAYED RELEASE ORAL at 07:10

## 2021-10-16 RX ADMIN — HEPARIN SODIUM 5000 UNIT(S): 5000 INJECTION INTRAVENOUS; SUBCUTANEOUS at 13:02

## 2021-10-16 NOTE — PROGRESS NOTE ADULT - ATTENDING COMMENTS
Acute hypoxemic respiratory failure 2/2 COVID, vaccinated. S/p decadron; no Rem because of elevated Cr. Unclear why she did not receive tocilizumab. Regardless she is improving. C/w weaning of HFNC.

## 2021-10-16 NOTE — PHYSICAL THERAPY INITIAL EVALUATION ADULT - PERTINENT HX OF CURRENT PROBLEM, REHAB EVAL
Pt is an 86 yo female presented to [facility] ED on 10/9 with 1w Hx of generalized weakness, fatigue, SOB, decreased appetite, and cough in the setting of positive COVID PCR.

## 2021-10-16 NOTE — PHYSICAL THERAPY INITIAL EVALUATION ADULT - GAIT DISTANCE, PT EVAL
5 feet forward/backward 5 feet forward/backward x5 trials with 1 sitting break secondary fatigue, SpO2 desat to 87% and requires 1.5 min to recover to 92%

## 2021-10-16 NOTE — PHYSICAL THERAPY INITIAL EVALUATION ADULT - GAIT DEVIATIONS NOTED, PT EVAL
fairly steady gait, no lose of balance, decreased heel strike and hip flexion bilaterally./decreased margarita/increased time in double stance/decreased step length

## 2021-10-16 NOTE — PROGRESS NOTE ADULT - SUBJECTIVE AND OBJECTIVE BOX
**INCOMPLETE NOTE    INTERVAL HPI/OVERNIGHT EVENTS:    OVERNIGHT: No overnight events.  SUBJECTIVE: Patient seen and examined at bedside.     ROS:  CV: Denies chest pain  Resp: Denies SOB  GI: Denies abdominal pain, constipation, diarrhea, nausea, vomiting  : Denies dysuria  ID: Denies fevers, chills  MSK: Denies joint pain     OBJECTIVE:    VITAL SIGNS:  ICU Vital Signs Last 24 Hrs  T(C): 36.8 (15 Oct 2021 22:00), Max: 37.3 (15 Oct 2021 18:00)  T(F): 98.2 (15 Oct 2021 22:00), Max: 99.1 (15 Oct 2021 18:00)  HR: 61 (16 Oct 2021 03:11) (49 - 83)  BP: 144/74 (16 Oct 2021 03:11) (127/67 - 169/82)  BP(mean): 103 (16 Oct 2021 03:11) (91 - 118)  ABP: --  ABP(mean): --  RR: 20 (16 Oct 2021 03:11) (20 - 48)  SpO2: 93% (16 Oct 2021 03:11) (88% - 98%)        10-14 @ 07:01  -  10-15 @ 07:00  --------------------------------------------------------  IN: 800 mL / OUT: 1800 mL / NET: -1000 mL      CAPILLARY BLOOD GLUCOSE      POCT Blood Glucose.: 139 mg/dL (15 Oct 2021 22:07)      PHYSICAL EXAM:  General: NAD, comfortable  HEENT: NCAT, PERRL, clear conjunctiva, no scleral icterus  Neck: supple, no JVD  Respiratory: CTA b/l, no wheezing, rhonchi, rales  Cardiovascular: RRR, normal S1S2, no M/R/G  Vascular: 2+ radial and DP pulses  Abdomen: soft, NT/ND, bowel sounds in all four quadrants, no palpable masses  Extremities: WWP, no clubbing, cyanosis, or edema  Skin: No rashes present  Neuro:     MEDICATIONS:  MEDICATIONS  (STANDING):  amLODIPine   Tablet 10 milliGRAM(s) Oral every 24 hours  chlorhexidine 2% Cloths 1 Application(s) Topical <User Schedule>  dexAMETHasone  Injectable 6 milliGRAM(s) IV Push every 24 hours  dextrose 40% Gel 15 Gram(s) Oral once  dextrose 5%. 1000 milliLiter(s) (50 mL/Hr) IV Continuous <Continuous>  dextrose 5%. 1000 milliLiter(s) (100 mL/Hr) IV Continuous <Continuous>  dextrose 50% Injectable 25 Gram(s) IV Push once  dextrose 50% Injectable 12.5 Gram(s) IV Push once  dextrose 50% Injectable 25 Gram(s) IV Push once  glucagon  Injectable 1 milliGRAM(s) IntraMuscular once  guaiFENesin Oral Liquid (Sugar-Free) 200 milliGRAM(s) Oral every 6 hours  heparin   Injectable 5000 Unit(s) SubCutaneous every 8 hours  insulin glargine Injectable (LANTUS) 12 Unit(s) SubCutaneous at bedtime  insulin lispro (ADMELOG) corrective regimen sliding scale   SubCutaneous Before meals and at bedtime  insulin lispro Injectable (ADMELOG) 7 Unit(s) SubCutaneous three times a day before meals  metoprolol succinate  milliGRAM(s) Oral every 24 hours  pantoprazole    Tablet 40 milliGRAM(s) Oral before breakfast  simvastatin 20 milliGRAM(s) Oral at bedtime    MEDICATIONS  (PRN):  acetaminophen   Tablet .. 650 milliGRAM(s) Oral every 6 hours PRN Temp greater or equal to 38C (100.4F), Mild Pain (1 - 3)  benzocaine 15 mG/menthol 3.6 mG Lozenge 1 Lozenge Oral every 4 hours PRN Sore Throat      ALLERGIES:  Allergies    Allergy Status Unknown    Intolerances        LABS:                        13.5   12.58 )-----------( 355      ( 16 Oct 2021 06:16 )             40.8     10-15    135  |  101  |  44<H>  ----------------------------<  264<H>  4.8   |  21<L>  |  2.45<H>    Ca    8.5      15 Oct 2021 07:13  Phos  3.3     10-15  Mg     2.0     10-15    TPro  6.7  /  Alb  3.1<L>  /  TBili  0.3  /  DBili  x   /  AST  13  /  ALT  10  /  AlkPhos  43  10-15          RADIOLOGY & ADDITIONAL TESTS: Reviewed.   INTERVAL HPI/OVERNIGHT EVENTS:    OVERNIGHT: No overnight events. Pt afebrile and hemodynamically stable.     SUBJECTIVE: Patient seen and examined at bedside. States that she feels tired. Endorses poor appetite     ROS: 12pt ROS otherwise negative unless stated above.     OBJECTIVE:    VITAL SIGNS:  ICU Vital Signs Last 24 Hrs  T(C): 36.8 (15 Oct 2021 22:00), Max: 37.3 (15 Oct 2021 18:00)  T(F): 98.2 (15 Oct 2021 22:00), Max: 99.1 (15 Oct 2021 18:00)  HR: 61 (16 Oct 2021 03:11) (49 - 83)  BP: 144/74 (16 Oct 2021 03:11) (127/67 - 169/82)  BP(mean): 103 (16 Oct 2021 03:11) (91 - 118)  ABP: --  ABP(mean): --  RR: 20 (16 Oct 2021 03:11) (20 - 48)  SpO2: 93% (16 Oct 2021 03:11) (88% - 98%)        10-14 @ 07:01  -  10-15 @ 07:00  --------------------------------------------------------  IN: 800 mL / OUT: 1800 mL / NET: -1000 mL      CAPILLARY BLOOD GLUCOSE      POCT Blood Glucose.: 139 mg/dL (15 Oct 2021 22:07)      PHYSICAL EXAM:  General: NAD, sitting comfortable in bed   HEENT: PERRL, clear conjunctiva, no scleral icterus  Neck: supple, no JVD  Respiratory: bilateral inspiratory crackles   Cardiovascular: RRR, normal S1S2, no M/R/G  Vascular: 2+ radial and DP pulses  Abdomen: soft, NT/ND, bowel sounds in all four quadrants, no palpable masses  Extremities: WWP, no clubbing, cyanosis, or edema  Skin: No rashes present  Neuro: A&O x3, no focal deficits     MEDICATIONS:  MEDICATIONS  (STANDING):  amLODIPine   Tablet 10 milliGRAM(s) Oral every 24 hours  chlorhexidine 2% Cloths 1 Application(s) Topical <User Schedule>  dexAMETHasone  Injectable 6 milliGRAM(s) IV Push every 24 hours  dextrose 40% Gel 15 Gram(s) Oral once  dextrose 5%. 1000 milliLiter(s) (50 mL/Hr) IV Continuous <Continuous>  dextrose 5%. 1000 milliLiter(s) (100 mL/Hr) IV Continuous <Continuous>  dextrose 50% Injectable 25 Gram(s) IV Push once  dextrose 50% Injectable 12.5 Gram(s) IV Push once  dextrose 50% Injectable 25 Gram(s) IV Push once  glucagon  Injectable 1 milliGRAM(s) IntraMuscular once  guaiFENesin Oral Liquid (Sugar-Free) 200 milliGRAM(s) Oral every 6 hours  heparin   Injectable 5000 Unit(s) SubCutaneous every 8 hours  insulin glargine Injectable (LANTUS) 12 Unit(s) SubCutaneous at bedtime  insulin lispro (ADMELOG) corrective regimen sliding scale   SubCutaneous Before meals and at bedtime  insulin lispro Injectable (ADMELOG) 7 Unit(s) SubCutaneous three times a day before meals  metoprolol succinate  milliGRAM(s) Oral every 24 hours  pantoprazole    Tablet 40 milliGRAM(s) Oral before breakfast  simvastatin 20 milliGRAM(s) Oral at bedtime    MEDICATIONS  (PRN):  acetaminophen   Tablet .. 650 milliGRAM(s) Oral every 6 hours PRN Temp greater or equal to 38C (100.4F), Mild Pain (1 - 3)  benzocaine 15 mG/menthol 3.6 mG Lozenge 1 Lozenge Oral every 4 hours PRN Sore Throat      ALLERGIES:  Allergies    Allergy Status Unknown    Intolerances        LABS:                        13.5   12.58 )-----------( 355      ( 16 Oct 2021 06:16 )             40.8     10-15    135  |  101  |  44<H>  ----------------------------<  264<H>  4.8   |  21<L>  |  2.45<H>    Ca    8.5      15 Oct 2021 07:13  Phos  3.3     10-15  Mg     2.0     10-15    TPro  6.7  /  Alb  3.1<L>  /  TBili  0.3  /  DBili  x   /  AST  13  /  ALT  10  /  AlkPhos  43  10-15          RADIOLOGY & ADDITIONAL TESTS: Reviewed.

## 2021-10-16 NOTE — PHYSICAL THERAPY INITIAL EVALUATION ADULT - GENERAL OBSERVATIONS, REHAB EVAL
Pt received OOB sitting in the chair, +HFNC FiO2:60%, +hep-lock, +EKG, +prima fit(dislodged, RN Romulo is notified post PT session). Pt left as found, NAD, call bell in reach, RN awares.

## 2021-10-16 NOTE — PROGRESS NOTE ADULT - ASSESSMENT
84 YO F with PMHx T2DM, HLD, HTN presented to [facility] ED on 10/9 with 1w Hx of generalized weakness, fatigue, SOB, decreased appetite, and cough in the setting of positive COVID PCR. Now with worsening hypoxemia i/s/o COVID PNA, now for stepdown to telemetry for further treatment.      NEURO  CORTES, AOx4    PULMOMARY  #AHRF 2/2 COVID PNA and possible aspiration PNA  Pt with worsening hypoxemia on HFNC 60/60 with PaO2 60 on ABG with worsening B/L infiltrates and CXR and new RML infiltrate. Patient without fever or leukocytosis while on antibiotics. Discontinued antibiotics at this time given these findings with lower suspicion of aspiration event.   - No increase WOB on current oxygen requirements and on stable HFNC 60/60  - C/w Decadron 6mg Q24h   - Trend CRP daily    CARDIOVASCULAR  #HTN  Pt home meds include amlodipine 10mg PO daily, metoprolol ER succinate 100mg PO daily and nifedipine 60mg ED daily  - c/w home amlodipine and metoprolol ER  - Can consider restarting nifedipine if BP remains elevated     GI  #GI PPX  - C/w PPI while on decadron    ENDO  #T2DM  Pt home meds include glimepiride 2mg BID and metformin 1g daily. Insulin needed while on steroids.  - A1c 7.1 (10/10)   - Currently at lantus 12U, lispro 5U pre-meal TID, adjust as needed   - moderate ISS  - consistent carb diet    ID  #COVID  Patient is vaccinated. Sxs started on 10/2, Covid + on outpatient PCR 10/9.  - Management as above     #Asx bacteruria  No tx as this time      RENAL  #JUDITH on possible CKD  Pt with no known hx of CKD but renal U/S showing chronic medical disease. Cr ~4.5 on admission s/p 12 hours of 100cc/hr LR pt with now improving Cr and GFR. Possible pre-renal in etiology vs. underlying covid infection. Currently urinating.   - Repeat BUN/Cr - 45/2.66, improving  - Continue to monitor  - Avoid nephrotoxic agents   - Strict I/Os     Prophylactic measures  F: tolerating PO, no IVF  E: replete K<4, Mg<2  N: Dash/TLC/CC  VTE Prophylaxis: Subq heparin 5000q8  C: Full Code  D: 7-Lach   84 YO F with PMHx T2DM, HLD, HTN presented to [facility] ED on 10/9 with 1w Hx of generalized weakness, fatigue, SOB, decreased appetite, and cough in the setting of positive COVID PCR. Now with worsening hypoxemia i/s/o COVID PNA, now for stepdown to telemetry for further treatment.      NEURO  # No active issues     PULMOMARY    #AHRF 2/2 COVID PNA and possible aspiration PNA  - Pt with worsening hypoxemia on HFNC 60/60 with PaO2 60 on ABG with worsening B/L infiltrates and CXR and new RML infiltrate. Patient without fever or leukocytosis while on antibiotics. Discontinued antibiotics at this time given these findings with lower suspicion of aspiration event.   - No increase WOB on current oxygen requirements and on stable HFNC 60/60    Plan:   - CT Chest to evaluate for additional cause of persistent hypoxia   - C/w Decadron 6mg Q24h   - Trend CRP daily    CARDIOVASCULAR  #HTN  Pt home meds include amlodipine 10mg PO daily, metoprolol ER succinate 100mg PO daily and nifedipine 60mg ED daily  - c/w home amlodipine and metoprolol ER  - Can consider restarting nifedipine if BP remains elevated     GI  #GI PPX  - C/w PPI while on decadron    ENDO  #T2DM  Pt home meds include glimepiride 2mg BID and metformin 1g daily. Insulin needed while on steroids.  - A1c 7.1 (10/10)   - hyperglycemic to 200s, ISS adjusted     Plan:   - Lantus 24U, lispro 7U pre-meal TID, adjust as needed   - moderate ISS  - consistent carb diet    ID  #COVID  Patient is vaccinated. Sxs started on 10/2, Covid + on outpatient PCR 10/9.  - Management as above     #Asx bacteruria  No tx as this time      RENAL  #JUDITH on possible CKD  Pt with no known hx of CKD but renal U/S showing chronic medical disease. Cr ~4.5 on admission s/p 12 hours of 100cc/hr LR pt with now improving Cr and GFR. Possible pre-renal in etiology vs. underlying covid infection. Currently urinating.   - Repeat BUN/Cr - 45/2.66, pt likely near baseline given plateau   - Continue to monitor  - Avoid nephrotoxic agents   - Strict I/Os     Prophylactic measures  F: tolerating PO, no IVF  E: replete K<4, Mg<2  N: Dash/TLC/CC  VTE Prophylaxis: Subq heparin 5000q8  C: Full Code  D: 7-Lach

## 2021-10-16 NOTE — PHYSICAL THERAPY INITIAL EVALUATION ADULT - IMPAIRED TRANSFERS: SIT/STAND, REHAB EVAL
requires VCs from PT for proper hands placement during transfer./impaired balance/impaired postural control/decreased strength

## 2021-10-16 NOTE — PHYSICAL THERAPY INITIAL EVALUATION ADULT - MODALITIES TREATMENT COMMENTS
Seated ther ex: LAQ, hip flexion, ankle pump, shoulder flexion/extension, scapular retraction/protraction with b/l shoulder horizontal abduction/adduction with deep breathing technique.

## 2021-10-17 LAB
ANION GAP SERPL CALC-SCNC: 12 MMOL/L — SIGNIFICANT CHANGE UP (ref 5–17)
BASOPHILS # BLD AUTO: 0.05 K/UL — SIGNIFICANT CHANGE UP (ref 0–0.2)
BASOPHILS NFR BLD AUTO: 0.5 % — SIGNIFICANT CHANGE UP (ref 0–2)
BUN SERPL-MCNC: 57 MG/DL — HIGH (ref 7–23)
CALCIUM SERPL-MCNC: 9.1 MG/DL — SIGNIFICANT CHANGE UP (ref 8.4–10.5)
CHLORIDE SERPL-SCNC: 101 MMOL/L — SIGNIFICANT CHANGE UP (ref 96–108)
CO2 SERPL-SCNC: 21 MMOL/L — LOW (ref 22–31)
CREAT SERPL-MCNC: 2.54 MG/DL — HIGH (ref 0.5–1.3)
CRP SERPL-MCNC: 19.6 MG/L — HIGH (ref 0–4)
EOSINOPHIL # BLD AUTO: 0.03 K/UL — SIGNIFICANT CHANGE UP (ref 0–0.5)
EOSINOPHIL NFR BLD AUTO: 0.3 % — SIGNIFICANT CHANGE UP (ref 0–6)
GLUCOSE BLDC GLUCOMTR-MCNC: 157 MG/DL — HIGH (ref 70–99)
GLUCOSE BLDC GLUCOMTR-MCNC: 160 MG/DL — HIGH (ref 70–99)
GLUCOSE BLDC GLUCOMTR-MCNC: 236 MG/DL — HIGH (ref 70–99)
GLUCOSE BLDC GLUCOMTR-MCNC: 285 MG/DL — HIGH (ref 70–99)
GLUCOSE SERPL-MCNC: 245 MG/DL — HIGH (ref 70–99)
HCT VFR BLD CALC: 44.1 % — SIGNIFICANT CHANGE UP (ref 34.5–45)
HGB BLD-MCNC: 14.3 G/DL — SIGNIFICANT CHANGE UP (ref 11.5–15.5)
IMM GRANULOCYTES NFR BLD AUTO: 6.5 % — HIGH (ref 0–1.5)
LYMPHOCYTES # BLD AUTO: 1.44 K/UL — SIGNIFICANT CHANGE UP (ref 1–3.3)
LYMPHOCYTES # BLD AUTO: 13 % — SIGNIFICANT CHANGE UP (ref 13–44)
MAGNESIUM SERPL-MCNC: 2 MG/DL — SIGNIFICANT CHANGE UP (ref 1.6–2.6)
MCHC RBC-ENTMCNC: 29.1 PG — SIGNIFICANT CHANGE UP (ref 27–34)
MCHC RBC-ENTMCNC: 32.4 GM/DL — SIGNIFICANT CHANGE UP (ref 32–36)
MCV RBC AUTO: 89.6 FL — SIGNIFICANT CHANGE UP (ref 80–100)
MONOCYTES # BLD AUTO: 0.44 K/UL — SIGNIFICANT CHANGE UP (ref 0–0.9)
MONOCYTES NFR BLD AUTO: 4 % — SIGNIFICANT CHANGE UP (ref 2–14)
NEUTROPHILS # BLD AUTO: 8.39 K/UL — HIGH (ref 1.8–7.4)
NEUTROPHILS NFR BLD AUTO: 75.7 % — SIGNIFICANT CHANGE UP (ref 43–77)
NRBC # BLD: 0 /100 WBCS — SIGNIFICANT CHANGE UP (ref 0–0)
PHOSPHATE SERPL-MCNC: 4.3 MG/DL — SIGNIFICANT CHANGE UP (ref 2.5–4.5)
PLATELET # BLD AUTO: 355 K/UL — SIGNIFICANT CHANGE UP (ref 150–400)
POTASSIUM SERPL-MCNC: 4.8 MMOL/L — SIGNIFICANT CHANGE UP (ref 3.5–5.3)
POTASSIUM SERPL-SCNC: 4.8 MMOL/L — SIGNIFICANT CHANGE UP (ref 3.5–5.3)
RBC # BLD: 4.92 M/UL — SIGNIFICANT CHANGE UP (ref 3.8–5.2)
RBC # FLD: 13.7 % — SIGNIFICANT CHANGE UP (ref 10.3–14.5)
SODIUM SERPL-SCNC: 134 MMOL/L — LOW (ref 135–145)
WBC # BLD: 11.07 K/UL — HIGH (ref 3.8–10.5)
WBC # FLD AUTO: 11.07 K/UL — HIGH (ref 3.8–10.5)

## 2021-10-17 PROCEDURE — 99233 SBSQ HOSP IP/OBS HIGH 50: CPT | Mod: GC

## 2021-10-17 RX ADMIN — Medication 200 MILLIGRAM(S): at 17:30

## 2021-10-17 RX ADMIN — Medication 4: at 07:51

## 2021-10-17 RX ADMIN — BENZOCAINE AND MENTHOL 1 LOZENGE: 5; 1 LIQUID ORAL at 21:45

## 2021-10-17 RX ADMIN — Medication 2: at 21:45

## 2021-10-17 RX ADMIN — INSULIN GLARGINE 24 UNIT(S): 100 INJECTION, SOLUTION SUBCUTANEOUS at 21:45

## 2021-10-17 RX ADMIN — Medication 6 MILLIGRAM(S): at 21:47

## 2021-10-17 RX ADMIN — Medication 200 MILLIGRAM(S): at 11:22

## 2021-10-17 RX ADMIN — PANTOPRAZOLE SODIUM 40 MILLIGRAM(S): 20 TABLET, DELAYED RELEASE ORAL at 06:07

## 2021-10-17 RX ADMIN — Medication 200 MILLIGRAM(S): at 06:07

## 2021-10-17 RX ADMIN — Medication 2: at 17:19

## 2021-10-17 RX ADMIN — HEPARIN SODIUM 5000 UNIT(S): 5000 INJECTION INTRAVENOUS; SUBCUTANEOUS at 14:10

## 2021-10-17 RX ADMIN — HEPARIN SODIUM 5000 UNIT(S): 5000 INJECTION INTRAVENOUS; SUBCUTANEOUS at 21:44

## 2021-10-17 RX ADMIN — AMLODIPINE BESYLATE 10 MILLIGRAM(S): 2.5 TABLET ORAL at 06:09

## 2021-10-17 RX ADMIN — HEPARIN SODIUM 5000 UNIT(S): 5000 INJECTION INTRAVENOUS; SUBCUTANEOUS at 06:07

## 2021-10-17 RX ADMIN — Medication 8 UNIT(S): at 17:18

## 2021-10-17 RX ADMIN — SIMVASTATIN 20 MILLIGRAM(S): 20 TABLET, FILM COATED ORAL at 21:48

## 2021-10-17 RX ADMIN — Medication 6: at 12:03

## 2021-10-17 RX ADMIN — Medication 100 MILLIGRAM(S): at 21:45

## 2021-10-17 RX ADMIN — Medication 8 UNIT(S): at 07:52

## 2021-10-17 RX ADMIN — Medication 200 MILLIGRAM(S): at 22:29

## 2021-10-17 RX ADMIN — Medication 8 UNIT(S): at 12:04

## 2021-10-17 RX ADMIN — Medication 60 MILLIGRAM(S): at 11:22

## 2021-10-17 NOTE — PROGRESS NOTE ADULT - SUBJECTIVE AND OBJECTIVE BOX
***INCOMPLETE****  OVERNIGHT EVENTS:    SUBJECTIVE / INTERVAL HPI: Patient seen and examined at bedside.     ROS: negative unless otherwise stated above.    VITAL SIGNS:  Vital Signs Last 24 Hrs  T(C): 36.9 (16 Oct 2021 17:11), Max: 36.9 (16 Oct 2021 17:11)  T(F): 98.4 (16 Oct 2021 17:11), Max: 98.4 (16 Oct 2021 17:11)  HR: 64 (17 Oct 2021 00:18) (64 - 84)  BP: 124/64 (16 Oct 2021 20:41) (118/75 - 174/84)  BP(mean): 88 (16 Oct 2021 20:41) (86 - 120)  RR: 20 (17 Oct 2021 00:18) (17 - 21)  SpO2: 92% (17 Oct 2021 00:18) (92% - 94%)    PHYSICAL EXAM:    General: In no apparent distress  HEENT: NC/AT; PERRL, anicteric sclera; MMM  Neck: supple  Cardiovascular: +S1/S2; RRR  Respiratory: CTA B/L; no W/R/R  Gastrointestinal: soft, NT/ND; +BSx4  Extremities: WWP; no edema, clubbing or cyanosis  Vascular: 2+ radial, DP/PT pulses B/L  Neurological: AAOx3; no focal deficits    MEDICATIONS:  MEDICATIONS  (STANDING):  amLODIPine   Tablet 10 milliGRAM(s) Oral every 24 hours  chlorhexidine 2% Cloths 1 Application(s) Topical <User Schedule>  dexAMETHasone  Injectable 6 milliGRAM(s) IV Push every 24 hours  dextrose 40% Gel 15 Gram(s) Oral once  dextrose 5%. 1000 milliLiter(s) (50 mL/Hr) IV Continuous <Continuous>  dextrose 5%. 1000 milliLiter(s) (100 mL/Hr) IV Continuous <Continuous>  dextrose 50% Injectable 25 Gram(s) IV Push once  dextrose 50% Injectable 12.5 Gram(s) IV Push once  dextrose 50% Injectable 25 Gram(s) IV Push once  glucagon  Injectable 1 milliGRAM(s) IntraMuscular once  guaiFENesin Oral Liquid (Sugar-Free) 200 milliGRAM(s) Oral every 6 hours  heparin   Injectable 5000 Unit(s) SubCutaneous every 8 hours  insulin glargine Injectable (LANTUS) 24 Unit(s) SubCutaneous at bedtime  insulin lispro (ADMELOG) corrective regimen sliding scale   SubCutaneous Before meals and at bedtime  insulin lispro Injectable (ADMELOG) 8 Unit(s) SubCutaneous three times a day before meals  metoprolol succinate  milliGRAM(s) Oral every 24 hours  NIFEdipine XL 60 milliGRAM(s) Oral every 24 hours  pantoprazole    Tablet 40 milliGRAM(s) Oral before breakfast  simvastatin 20 milliGRAM(s) Oral at bedtime    MEDICATIONS  (PRN):  acetaminophen   Tablet .. 650 milliGRAM(s) Oral every 6 hours PRN Temp greater or equal to 38C (100.4F), Mild Pain (1 - 3)  benzocaine 15 mG/menthol 3.6 mG Lozenge 1 Lozenge Oral every 4 hours PRN Sore Throat      ALLERGIES:  Allergies    Allergy Status Unknown    Intolerances        LABS:                        13.5   12.58 )-----------( 355      ( 16 Oct 2021 06:16 )             40.8     10-16    135  |  103  |  47<H>  ----------------------------<  272<H>  5.1   |  20<L>  |  2.34<H>    Ca    8.8      16 Oct 2021 06:16  Phos  3.1     10-16  Mg     1.9     10-16    TPro  6.8  /  Alb  2.9<L>  /  TBili  0.3  /  DBili  x   /  AST  11  /  ALT  10  /  AlkPhos  45  10-16        CAPILLARY BLOOD GLUCOSE      POCT Blood Glucose.: 132 mg/dL (16 Oct 2021 22:14)      RADIOLOGY & ADDITIONAL TESTS: Reviewed. OVERNIGHT EVENTS: No acute events overnight.    SUBJECTIVE / INTERVAL HPI: Patient seen and examined at bedside. Pt feels her breathing is improving, but still requiring HFNC. +Coughing.    ROS: negative unless otherwise stated above.    VITAL SIGNS:  Vital Signs Last 24 Hrs  T(C): 36.9 (16 Oct 2021 17:11), Max: 36.9 (16 Oct 2021 17:11)  T(F): 98.4 (16 Oct 2021 17:11), Max: 98.4 (16 Oct 2021 17:11)  HR: 64 (17 Oct 2021 00:18) (64 - 84)  BP: 124/64 (16 Oct 2021 20:41) (118/75 - 174/84)  BP(mean): 88 (16 Oct 2021 20:41) (86 - 120)  RR: 20 (17 Oct 2021 00:18) (17 - 21)  SpO2: 92% (17 Oct 2021 00:18) (92% - 94%)    PHYSICAL EXAM:    General: In some respiratory distress, but tolerated HFNC well  HEENT: NC/AT; PERRL, anicteric sclera; MMM  Neck: supple  Cardiovascular: +S1/S2; RRR  Respiratory: Diminished at the bases, no wheeze, no crackles   Gastrointestinal: soft, NT/ND; +BSx4  Extremities: WWP; no edema, clubbing or cyanosis  Vascular: 2+ radial, DP/PT pulses B/L  Neurological: AAOx3; no focal deficits    MEDICATIONS:  MEDICATIONS  (STANDING):  amLODIPine   Tablet 10 milliGRAM(s) Oral every 24 hours  chlorhexidine 2% Cloths 1 Application(s) Topical <User Schedule>  dexAMETHasone  Injectable 6 milliGRAM(s) IV Push every 24 hours  dextrose 40% Gel 15 Gram(s) Oral once  dextrose 5%. 1000 milliLiter(s) (50 mL/Hr) IV Continuous <Continuous>  dextrose 5%. 1000 milliLiter(s) (100 mL/Hr) IV Continuous <Continuous>  dextrose 50% Injectable 25 Gram(s) IV Push once  dextrose 50% Injectable 12.5 Gram(s) IV Push once  dextrose 50% Injectable 25 Gram(s) IV Push once  glucagon  Injectable 1 milliGRAM(s) IntraMuscular once  guaiFENesin Oral Liquid (Sugar-Free) 200 milliGRAM(s) Oral every 6 hours  heparin   Injectable 5000 Unit(s) SubCutaneous every 8 hours  insulin glargine Injectable (LANTUS) 24 Unit(s) SubCutaneous at bedtime  insulin lispro (ADMELOG) corrective regimen sliding scale   SubCutaneous Before meals and at bedtime  insulin lispro Injectable (ADMELOG) 8 Unit(s) SubCutaneous three times a day before meals  metoprolol succinate  milliGRAM(s) Oral every 24 hours  NIFEdipine XL 60 milliGRAM(s) Oral every 24 hours  pantoprazole    Tablet 40 milliGRAM(s) Oral before breakfast  simvastatin 20 milliGRAM(s) Oral at bedtime    MEDICATIONS  (PRN):  acetaminophen   Tablet .. 650 milliGRAM(s) Oral every 6 hours PRN Temp greater or equal to 38C (100.4F), Mild Pain (1 - 3)  benzocaine 15 mG/menthol 3.6 mG Lozenge 1 Lozenge Oral every 4 hours PRN Sore Throat      ALLERGIES:  Allergies    Allergy Status Unknown    Intolerances        LABS:                        13.5   12.58 )-----------( 355      ( 16 Oct 2021 06:16 )             40.8     10-16    135  |  103  |  47<H>  ----------------------------<  272<H>  5.1   |  20<L>  |  2.34<H>    Ca    8.8      16 Oct 2021 06:16  Phos  3.1     10-16  Mg     1.9     10-16    TPro  6.8  /  Alb  2.9<L>  /  TBili  0.3  /  DBili  x   /  AST  11  /  ALT  10  /  AlkPhos  45  10-16        CAPILLARY BLOOD GLUCOSE      POCT Blood Glucose.: 132 mg/dL (16 Oct 2021 22:14)      RADIOLOGY & ADDITIONAL TESTS: Reviewed.

## 2021-10-17 NOTE — PROGRESS NOTE ADULT - ASSESSMENT
86 YO F with PMHx T2DM, HLD, HTN presented to [facility] ED on 10/9 with 1w Hx of generalized weakness, fatigue, SOB, decreased appetite, and cough in the setting of positive COVID PCR. Now with worsening hypoxemia i/s/o COVID PNA, now for stepdown to telemetry for further treatment.      NEURO  # No active issues     PULMOMARY    #AHRF 2/2 COVID PNA and possible aspiration PNA  - Pt with worsening hypoxemia on HFNC 60/60 with PaO2 60 on ABG with worsening B/L infiltrates and CXR and new RML infiltrate. Patient without fever or leukocytosis while on antibiotics. Discontinued antibiotics at this time given these findings with lower suspicion of aspiration event.   - No increase WOB on current oxygen requirements and on stable HFNC 60/60    Plan:   - CT Chest to evaluate for additional cause of persistent hypoxia   - C/w Decadron 6mg Q24h   - Trend CRP daily    CARDIOVASCULAR  #HTN  Pt home meds include amlodipine 10mg PO daily, metoprolol ER succinate 100mg PO daily and nifedipine 60mg ED daily  - c/w home amlodipine and metoprolol ER  - Can consider restarting nifedipine if BP remains elevated     GI  #GI PPX  - C/w PPI while on decadron    ENDO  #T2DM  Pt home meds include glimepiride 2mg BID and metformin 1g daily. Insulin needed while on steroids.  - A1c 7.1 (10/10)   - hyperglycemic to 200s, ISS adjusted     Plan:   - Lantus 24U, lispro 7U pre-meal TID, adjust as needed   - moderate ISS  - consistent carb diet    ID  #COVID  Patient is vaccinated. Sxs started on 10/2, Covid + on outpatient PCR 10/9.  - Management as above     #Asx bacteruria  No tx as this time      RENAL  #JUDITH on possible CKD  Pt with no known hx of CKD but renal U/S showing chronic medical disease. Cr ~4.5 on admission s/p 12 hours of 100cc/hr LR pt with now improving Cr and GFR. Possible pre-renal in etiology vs. underlying covid infection. Currently urinating.   - Repeat BUN/Cr - 45/2.66, pt likely near baseline given plateau   - Continue to monitor  - Avoid nephrotoxic agents   - Strict I/Os     Prophylactic measures  F: tolerating PO, no IVF  E: replete K<4, Mg<2  N: Dash/TLC/CC  VTE Prophylaxis: Subq heparin 5000q8  C: Full Code  D: 7-Lach   86 YO F with PMHx T2DM, HLD, HTN presented to [facility] ED on 10/9 with 1w Hx of generalized weakness, fatigue, SOB, decreased appetite, and cough in the setting of positive COVID PCR. Admitted for hypoxemia 2/2 COVID PNA.     NEURO  # No active issues     PULMONARY    #AHRF 2/2 COVID PNA and possible aspiration PNA  - Pt with worsening hypoxemia on HFNC 60/50 with PaO2 60 on ABG with worsening B/L infiltrates and CXR and new RML infiltrate. Patient without fever or leukocytosis while on antibiotics. Discontinued antibiotics given these findings with lower suspicion of aspiration event.   - No increased WOB currently and on stable HFNC 60/50  - CRP trending down    Plan:   - CT Chest to evaluate for additional cause of persistent hypoxia   - C/w Decadron 6mg Q24h (10/9 - 10/19)    CARDIOVASCULAR  #HTN  Pt home meds: amlodipine 10mg PO daily, metoprolol ER succinate 100mg PO daily and nifedipine 60mg ED daily  - c/w home amlodipine, metoprolol, nifedipine    GI  #GI PPX  - C/w pantoprazole 40mg daily while on decadron    ENDO  #T2DM  Pt home meds include glimepiride 2mg BID and metformin 1g daily. Insulin needed while on steroids.  - A1c 7.1 (10/10)     Plan:   - Lantus 24U, lispro 8U pre-meal TID, adjust as needed   - moderate ISS  - consistent carb diet    ID  #COVID  Patient is vaccinated. Sxs started on 10/2, Covid + on outpatient PCR 10/9.  - Management as above     #Asx bacteruria  No tx as this time      RENAL  #JUDITH on possible CKD  Pt with no known hx of CKD but renal U/S showing chronic medical disease. Cr ~4.5 on admission s/p 12 hours of 100cc/hr LR pt with now improving Cr and GFR. Possible pre-renal in etiology vs. underlying covid infection. Currently urinating.   -BUN/Cr now plateau'd - pt may now be at baseline    Plan:  - Continue to monitor  - Avoid nephrotoxic agents   - Strict I/Os     Prophylactic measures  F: tolerating PO, no IVF  E: replete K<4, Mg<2  N: Dash/TLC/CC  VTE Prophylaxis: Subq heparin 5000q8  C: Full Code  D: 7-Lach

## 2021-10-17 NOTE — PROGRESS NOTE ADULT - ATTENDING COMMENTS
Pt called back. She will call Dr Sheehan office to schedule EKG.   Acute hypoxemic respiratory failure 2/2 COVID, vaccinated. S/p decadron; no Rem because of elevated Cr. Unclear why she did not receive tocilizumab. Regardless she is improving. C/w weaning of HFNC.

## 2021-10-18 DIAGNOSIS — Z29.9 ENCOUNTER FOR PROPHYLACTIC MEASURES, UNSPECIFIED: ICD-10-CM

## 2021-10-18 DIAGNOSIS — U07.1 COVID-19: ICD-10-CM

## 2021-10-18 DIAGNOSIS — J69.0 PNEUMONITIS DUE TO INHALATION OF FOOD AND VOMIT: ICD-10-CM

## 2021-10-18 DIAGNOSIS — N17.9 ACUTE KIDNEY FAILURE, UNSPECIFIED: ICD-10-CM

## 2021-10-18 DIAGNOSIS — J96.91 RESPIRATORY FAILURE, UNSPECIFIED WITH HYPOXIA: ICD-10-CM

## 2021-10-18 LAB
ALBUMIN SERPL ELPH-MCNC: 3.3 G/DL — SIGNIFICANT CHANGE UP (ref 3.3–5)
ALP SERPL-CCNC: 48 U/L — SIGNIFICANT CHANGE UP (ref 40–120)
ALT FLD-CCNC: 11 U/L — SIGNIFICANT CHANGE UP (ref 10–45)
ANION GAP SERPL CALC-SCNC: 13 MMOL/L — SIGNIFICANT CHANGE UP (ref 5–17)
AST SERPL-CCNC: 14 U/L — SIGNIFICANT CHANGE UP (ref 10–40)
BASOPHILS # BLD AUTO: 0.06 K/UL — SIGNIFICANT CHANGE UP (ref 0–0.2)
BASOPHILS NFR BLD AUTO: 0.5 % — SIGNIFICANT CHANGE UP (ref 0–2)
BILIRUB SERPL-MCNC: 0.2 MG/DL — SIGNIFICANT CHANGE UP (ref 0.2–1.2)
BUN SERPL-MCNC: 63 MG/DL — HIGH (ref 7–23)
CALCIUM SERPL-MCNC: 9.1 MG/DL — SIGNIFICANT CHANGE UP (ref 8.4–10.5)
CHLORIDE SERPL-SCNC: 101 MMOL/L — SIGNIFICANT CHANGE UP (ref 96–108)
CO2 SERPL-SCNC: 19 MMOL/L — LOW (ref 22–31)
CREAT SERPL-MCNC: 2.56 MG/DL — HIGH (ref 0.5–1.3)
CULTURE RESULTS: SIGNIFICANT CHANGE UP
CULTURE RESULTS: SIGNIFICANT CHANGE UP
EOSINOPHIL # BLD AUTO: 0.03 K/UL — SIGNIFICANT CHANGE UP (ref 0–0.5)
EOSINOPHIL NFR BLD AUTO: 0.2 % — SIGNIFICANT CHANGE UP (ref 0–6)
GLUCOSE BLDC GLUCOMTR-MCNC: 204 MG/DL — HIGH (ref 70–99)
GLUCOSE BLDC GLUCOMTR-MCNC: 269 MG/DL — HIGH (ref 70–99)
GLUCOSE BLDC GLUCOMTR-MCNC: 272 MG/DL — HIGH (ref 70–99)
GLUCOSE BLDC GLUCOMTR-MCNC: 286 MG/DL — HIGH (ref 70–99)
GLUCOSE SERPL-MCNC: 243 MG/DL — HIGH (ref 70–99)
HCT VFR BLD CALC: 44 % — SIGNIFICANT CHANGE UP (ref 34.5–45)
HGB BLD-MCNC: 14.1 G/DL — SIGNIFICANT CHANGE UP (ref 11.5–15.5)
IMM GRANULOCYTES NFR BLD AUTO: 5.6 % — HIGH (ref 0–1.5)
LYMPHOCYTES # BLD AUTO: 1.52 K/UL — SIGNIFICANT CHANGE UP (ref 1–3.3)
LYMPHOCYTES # BLD AUTO: 12.6 % — LOW (ref 13–44)
MAGNESIUM SERPL-MCNC: 2.1 MG/DL — SIGNIFICANT CHANGE UP (ref 1.6–2.6)
MCHC RBC-ENTMCNC: 28.6 PG — SIGNIFICANT CHANGE UP (ref 27–34)
MCHC RBC-ENTMCNC: 32 GM/DL — SIGNIFICANT CHANGE UP (ref 32–36)
MCV RBC AUTO: 89.2 FL — SIGNIFICANT CHANGE UP (ref 80–100)
MONOCYTES # BLD AUTO: 0.37 K/UL — SIGNIFICANT CHANGE UP (ref 0–0.9)
MONOCYTES NFR BLD AUTO: 3.1 % — SIGNIFICANT CHANGE UP (ref 2–14)
NEUTROPHILS # BLD AUTO: 9.42 K/UL — HIGH (ref 1.8–7.4)
NEUTROPHILS NFR BLD AUTO: 78 % — HIGH (ref 43–77)
NRBC # BLD: 0 /100 WBCS — SIGNIFICANT CHANGE UP (ref 0–0)
ORGANISM # SPEC MICROSCOPIC CNT: SIGNIFICANT CHANGE UP
PHOSPHATE SERPL-MCNC: 5 MG/DL — HIGH (ref 2.5–4.5)
PLATELET # BLD AUTO: 351 K/UL — SIGNIFICANT CHANGE UP (ref 150–400)
POTASSIUM SERPL-MCNC: 4.9 MMOL/L — SIGNIFICANT CHANGE UP (ref 3.5–5.3)
POTASSIUM SERPL-SCNC: 4.9 MMOL/L — SIGNIFICANT CHANGE UP (ref 3.5–5.3)
PROT SERPL-MCNC: 7.2 G/DL — SIGNIFICANT CHANGE UP (ref 6–8.3)
RBC # BLD: 4.93 M/UL — SIGNIFICANT CHANGE UP (ref 3.8–5.2)
RBC # FLD: 13.8 % — SIGNIFICANT CHANGE UP (ref 10.3–14.5)
SARS-COV-2 RNA SPEC QL NAA+PROBE: DETECTED
SODIUM SERPL-SCNC: 133 MMOL/L — LOW (ref 135–145)
SPECIMEN SOURCE: SIGNIFICANT CHANGE UP
SPECIMEN SOURCE: SIGNIFICANT CHANGE UP
WBC # BLD: 12.08 K/UL — HIGH (ref 3.8–10.5)
WBC # FLD AUTO: 12.08 K/UL — HIGH (ref 3.8–10.5)

## 2021-10-18 PROCEDURE — 99233 SBSQ HOSP IP/OBS HIGH 50: CPT | Mod: GC

## 2021-10-18 PROCEDURE — 71045 X-RAY EXAM CHEST 1 VIEW: CPT | Mod: 26

## 2021-10-18 RX ORDER — SENNA PLUS 8.6 MG/1
2 TABLET ORAL AT BEDTIME
Refills: 0 | Status: DISCONTINUED | OUTPATIENT
Start: 2021-10-18 | End: 2021-10-21

## 2021-10-18 RX ORDER — INSULIN GLARGINE 100 [IU]/ML
12 INJECTION, SOLUTION SUBCUTANEOUS AT BEDTIME
Refills: 0 | Status: DISCONTINUED | OUTPATIENT
Start: 2021-10-18 | End: 2021-10-19

## 2021-10-18 RX ORDER — INSULIN GLARGINE 100 [IU]/ML
24 INJECTION, SOLUTION SUBCUTANEOUS AT BEDTIME
Refills: 0 | Status: DISCONTINUED | OUTPATIENT
Start: 2021-10-18 | End: 2021-10-18

## 2021-10-18 RX ORDER — INSULIN GLARGINE 100 [IU]/ML
33 INJECTION, SOLUTION SUBCUTANEOUS AT BEDTIME
Refills: 0 | Status: DISCONTINUED | OUTPATIENT
Start: 2021-10-18 | End: 2021-10-18

## 2021-10-18 RX ORDER — POLYETHYLENE GLYCOL 3350 17 G/17G
17 POWDER, FOR SOLUTION ORAL DAILY
Refills: 0 | Status: DISCONTINUED | OUTPATIENT
Start: 2021-10-18 | End: 2021-10-21

## 2021-10-18 RX ORDER — INSULIN LISPRO 100/ML
5 VIAL (ML) SUBCUTANEOUS
Refills: 0 | Status: DISCONTINUED | OUTPATIENT
Start: 2021-10-18 | End: 2021-10-19

## 2021-10-18 RX ORDER — INSULIN LISPRO 100/ML
11 VIAL (ML) SUBCUTANEOUS
Refills: 0 | Status: DISCONTINUED | OUTPATIENT
Start: 2021-10-18 | End: 2021-10-18

## 2021-10-18 RX ORDER — INSULIN LISPRO 100/ML
8 VIAL (ML) SUBCUTANEOUS
Refills: 0 | Status: DISCONTINUED | OUTPATIENT
Start: 2021-10-18 | End: 2021-10-18

## 2021-10-18 RX ADMIN — POLYETHYLENE GLYCOL 3350 17 GRAM(S): 17 POWDER, FOR SOLUTION ORAL at 18:59

## 2021-10-18 RX ADMIN — Medication 200 MILLIGRAM(S): at 17:45

## 2021-10-18 RX ADMIN — Medication 6: at 12:26

## 2021-10-18 RX ADMIN — Medication 8 UNIT(S): at 07:27

## 2021-10-18 RX ADMIN — INSULIN GLARGINE 12 UNIT(S): 100 INJECTION, SOLUTION SUBCUTANEOUS at 21:39

## 2021-10-18 RX ADMIN — AMLODIPINE BESYLATE 10 MILLIGRAM(S): 2.5 TABLET ORAL at 06:58

## 2021-10-18 RX ADMIN — Medication 6 MILLIGRAM(S): at 21:39

## 2021-10-18 RX ADMIN — Medication 6: at 17:44

## 2021-10-18 RX ADMIN — HEPARIN SODIUM 5000 UNIT(S): 5000 INJECTION INTRAVENOUS; SUBCUTANEOUS at 13:05

## 2021-10-18 RX ADMIN — Medication 4: at 07:26

## 2021-10-18 RX ADMIN — PANTOPRAZOLE SODIUM 40 MILLIGRAM(S): 20 TABLET, DELAYED RELEASE ORAL at 06:58

## 2021-10-18 RX ADMIN — HEPARIN SODIUM 5000 UNIT(S): 5000 INJECTION INTRAVENOUS; SUBCUTANEOUS at 06:58

## 2021-10-18 RX ADMIN — Medication 200 MILLIGRAM(S): at 11:43

## 2021-10-18 RX ADMIN — HEPARIN SODIUM 5000 UNIT(S): 5000 INJECTION INTRAVENOUS; SUBCUTANEOUS at 21:39

## 2021-10-18 RX ADMIN — Medication 5 UNIT(S): at 17:44

## 2021-10-18 RX ADMIN — SENNA PLUS 2 TABLET(S): 8.6 TABLET ORAL at 21:38

## 2021-10-18 RX ADMIN — SIMVASTATIN 20 MILLIGRAM(S): 20 TABLET, FILM COATED ORAL at 21:40

## 2021-10-18 RX ADMIN — Medication 100 MILLIGRAM(S): at 21:40

## 2021-10-18 RX ADMIN — Medication 6: at 21:40

## 2021-10-18 RX ADMIN — Medication 200 MILLIGRAM(S): at 06:57

## 2021-10-18 NOTE — PROGRESS NOTE ADULT - PROBLEM SELECTOR PLAN 2
COVID-19 infection in the setting of complete vaccination.     - Completed 10 day course of Dexamethasone on 10/18   - Remdesivir held due to impaired renal function   - Saturating 94% on 4L NC   - Wean O2 as tolerated   - Tylenol prn for fever COVID-19 infection in the setting of complete vaccination.     - Completed 10 day course of Dexamethasone on 10/18  - Pt on Protonix while receiving steroid treatment   - Remdesivir held due to impaired renal function   - Saturating 94% on 4L NC   - Wean O2 as tolerated   - Tylenol prn for fever

## 2021-10-18 NOTE — PROGRESS NOTE ADULT - PROBLEM SELECTOR PLAN 1
Pt admitted w/ acute hypoxic respiratory Pt admitted w/ acute hypoxic respiratory failure 2/2 respiratory failure. Weaned from HFNC to NC but became acutely hypoxic requiring HFNC in the setting of presumed aspiration pneumonia Pt admitted w/ acute hypoxic respiratory failure 2/2 respiratory failure. Weaned from HFNC to NC but became acutely hypoxic requiring HFNC in the setting of presumed aspiration pneumonia.     - S/p vanc/zosyn for aspiration pneumonia   - Right sided infiltrate still present on   - Weaned from HFNC to NC   - Saturating 94% on 4L NC   - Obtain ambulatory O2 saturation   - Continue to wean O2 as tolerated

## 2021-10-18 NOTE — PROGRESS NOTE ADULT - PROBLEM SELECTOR PLAN 6
Pt home meds include glimepiride 2mg BID and metformin 1g daily. Treated w/ inuslin while on steroids.       - Pt hyperglycemic during hospitalization in the setting of steroid use   - insulin regimen decreased to 8U Lantus at bedtime and 7U premeal due to completed steroid course

## 2021-10-18 NOTE — PROGRESS NOTE ADULT - ASSESSMENT
86 YO F with PMHx T2DM, HLD, HTN presented to [facility] ED on 10/9 with 1w Hx of generalized weakness, fatigue, SOB, decreased appetite, and cough in the setting of positive COVID PCR. Admitted for hypoxemia 2/2 COVID PNA.     NEURO  # No active issues     PULMONARY    #AHRF 2/2 COVID PNA and possible aspiration PNA  - Pt with worsening hypoxemia on HFNC 60/50 with PaO2 60 on ABG with worsening B/L infiltrates and CXR and new RML infiltrate. Patient without fever or leukocytosis while on antibiotics. Discontinued antibiotics given these findings with lower suspicion of aspiration event.   - No increased WOB currently and on stable HFNC 60/50  - CRP trending down    Plan:   - CT Chest to evaluate for additional cause of persistent hypoxia   - C/w Decadron 6mg Q24h (10/9 - 10/19)    CARDIOVASCULAR  #HTN  Pt home meds: amlodipine 10mg PO daily, metoprolol ER succinate 100mg PO daily and nifedipine 60mg ED daily  - c/w home amlodipine, metoprolol, nifedipine    GI  #GI PPX  - C/w pantoprazole 40mg daily while on decadron    ENDO  #T2DM  Pt home meds include glimepiride 2mg BID and metformin 1g daily. Insulin needed while on steroids.  - A1c 7.1 (10/10)     Plan:   - Lantus 24U, lispro 8U pre-meal TID, adjust as needed   - moderate ISS  - consistent carb diet    ID  #COVID  Patient is vaccinated. Sxs started on 10/2, Covid + on outpatient PCR 10/9.  - Management as above     #Asx bacteruria  No tx as this time      RENAL  #JUDITH on possible CKD  Pt with no known hx of CKD but renal U/S showing chronic medical disease. Cr ~4.5 on admission s/p 12 hours of 100cc/hr LR pt with now improving Cr and GFR. Possible pre-renal in etiology vs. underlying covid infection. Currently urinating.   -BUN/Cr now plateau'd - pt may now be at baseline    Plan:  - Continue to monitor  - Avoid nephrotoxic agents   - Strict I/Os     Prophylactic measures  F: tolerating PO, no IVF  E: replete K<4, Mg<2  N: Dash/TLC/CC  VTE Prophylaxis: Subq heparin 5000q8  C: Full Code  D: 7-Lach   86 YO F with PMHx T2DM, HLD, HTN presented to [facility] ED on 10/9 with 1w Hx of generalized weakness, fatigue, SOB, decreased appetite, and cough in the setting of positive COVID PCR. Admitted for hypoxemia 2/2 COVID PNA.

## 2021-10-18 NOTE — PROGRESS NOTE ADULT - SUBJECTIVE AND OBJECTIVE BOX
**INCOMPLETE NOTE    INTERVAL HPI/OVERNIGHT EVENTS:    OVERNIGHT: No overnight events.  SUBJECTIVE: Patient seen and examined at bedside.     ROS:  CV: Denies chest pain  Resp: Denies SOB  GI: Denies abdominal pain, constipation, diarrhea, nausea, vomiting  : Denies dysuria  ID: Denies fevers, chills  MSK: Denies joint pain     OBJECTIVE:    VITAL SIGNS:  ICU Vital Signs Last 24 Hrs  T(C): 36.8 (17 Oct 2021 22:00), Max: 36.8 (17 Oct 2021 22:00)  T(F): 98.2 (17 Oct 2021 22:00), Max: 98.2 (17 Oct 2021 22:00)  HR: 97 (17 Oct 2021 21:19) (66 - 97)  BP: 144/77 (17 Oct 2021 21:19) (132/72 - 144/77)  BP(mean): 105 (17 Oct 2021 21:19) (97 - 105)  ABP: --  ABP(mean): --  RR: 18 (17 Oct 2021 21:19) (14 - 20)  SpO2: 90% (17 Oct 2021 21:19) (90% - 94%)        10-16 @ 07:01  -  10-17 @ 07:00  --------------------------------------------------------  IN: 354 mL / OUT: 1800 mL / NET: -1446 mL    10-17 @ 07:01  -  10-18 @ 06:50  --------------------------------------------------------  IN: 527 mL / OUT: 550 mL / NET: -23 mL      CAPILLARY BLOOD GLUCOSE      POCT Blood Glucose.: 160 mg/dL (17 Oct 2021 21:27)      PHYSICAL EXAM:  General: NAD, comfortable  HEENT: NCAT, PERRL, clear conjunctiva, no scleral icterus  Neck: supple, no JVD  Respiratory: CTA b/l, no wheezing, rhonchi, rales  Cardiovascular: RRR, normal S1S2, no M/R/G  Vascular: 2+ radial and DP pulses  Abdomen: soft, NT/ND, bowel sounds in all four quadrants, no palpable masses  Extremities: WWP, no clubbing, cyanosis, or edema  Skin: No rashes present  Neuro:     MEDICATIONS:  MEDICATIONS  (STANDING):  amLODIPine   Tablet 10 milliGRAM(s) Oral every 24 hours  chlorhexidine 2% Cloths 1 Application(s) Topical <User Schedule>  dexAMETHasone  Injectable 6 milliGRAM(s) IV Push every 24 hours  dextrose 40% Gel 15 Gram(s) Oral once  dextrose 5%. 1000 milliLiter(s) (50 mL/Hr) IV Continuous <Continuous>  dextrose 5%. 1000 milliLiter(s) (100 mL/Hr) IV Continuous <Continuous>  dextrose 50% Injectable 25 Gram(s) IV Push once  dextrose 50% Injectable 12.5 Gram(s) IV Push once  dextrose 50% Injectable 25 Gram(s) IV Push once  glucagon  Injectable 1 milliGRAM(s) IntraMuscular once  guaiFENesin Oral Liquid (Sugar-Free) 200 milliGRAM(s) Oral every 6 hours  heparin   Injectable 5000 Unit(s) SubCutaneous every 8 hours  insulin glargine Injectable (LANTUS) 24 Unit(s) SubCutaneous at bedtime  insulin lispro (ADMELOG) corrective regimen sliding scale   SubCutaneous Before meals and at bedtime  insulin lispro Injectable (ADMELOG) 8 Unit(s) SubCutaneous three times a day before meals  metoprolol succinate  milliGRAM(s) Oral every 24 hours  NIFEdipine XL 60 milliGRAM(s) Oral every 24 hours  pantoprazole    Tablet 40 milliGRAM(s) Oral before breakfast  simvastatin 20 milliGRAM(s) Oral at bedtime    MEDICATIONS  (PRN):  acetaminophen   Tablet .. 650 milliGRAM(s) Oral every 6 hours PRN Temp greater or equal to 38C (100.4F), Mild Pain (1 - 3)  benzocaine 15 mG/menthol 3.6 mG Lozenge 1 Lozenge Oral every 4 hours PRN Sore Throat      ALLERGIES:  Allergies    Allergy Status Unknown    Intolerances        LABS:                        14.3   11.07 )-----------( 355      ( 17 Oct 2021 08:33 )             44.1     10-17    134<L>  |  101  |  57<H>  ----------------------------<  245<H>  4.8   |  21<L>  |  2.54<H>    Ca    9.1      17 Oct 2021 08:33  Phos  4.3     10-17  Mg     2.0     10-17            RADIOLOGY & ADDITIONAL TESTS: Reviewed. Transfer 7L to [facility]       86 YO F with PMHx T2DM, HLD, HTN presented to [facility] ED on 10/9 with 1w Hx of generalized weakness, fatigue, SOB, decreased appetite, and cough in the setting of positive COVID PCR as outpatient. Patient reports no recent travel however has been in contact with her son who recently tested COVID positive 1 week prior. Pt saw her PMD (Dr. Knight [phone]) who reportedly told her to take mucinex and prescribed erythromycin for symptoms 3d ago. Pt was notified today that COVID test came back positive, told to come to ED. Of note, patient received both doses of the Pfizer COVID vaccine in Jan/Feb. Pt received flu shot in September. Patient was stepped down from 7L on 10/12 on 6L NC saturating in the low 90s. Late 10/12 the patient had an aspiration event when she vomited. At that time she became hypoxic to 88%, requiring O2 escalation to HFNC. She remained hypoxic w. increased work of breathing despite proning and HFNC. Pt was, therefore, transferred to the ICU for acute respiratory failure 2/2 COVID w/ superimposed aspiration pneumonia. Pt remained stable in the MICU w/o pressure support or mechanical ventilation. S/p vancomycin and zosyn treatment. She was stepped down to 7LA, where she was successfully weaned form HFNC 60/60 to 4l NC. Pt stable for transfer to [facility].     INTERVAL HPI/OVERNIGHT EVENTS:    OVERNIGHT: No acute overnight events. Pt weaned from to NC.     SUBJECTIVE: Patient seen and examined at bedside. States that she feels well. Denies SOB or chest pain.     ROS:  CV: Denies chest pain  Resp: Denies SOB  GI: Denies abdominal pain, constipation, diarrhea, nausea, vomiting  : Denies dysuria  ID: Denies fevers, chills  MSK: Denies joint pain     OBJECTIVE:    VITAL SIGNS:  ICU Vital Signs Last 24 Hrs  T(C): 36.8 (17 Oct 2021 22:00), Max: 36.8 (17 Oct 2021 22:00)  T(F): 98.2 (17 Oct 2021 22:00), Max: 98.2 (17 Oct 2021 22:00)  HR: 97 (17 Oct 2021 21:19) (66 - 97)  BP: 144/77 (17 Oct 2021 21:19) (132/72 - 144/77)  BP(mean): 105 (17 Oct 2021 21:19) (97 - 105)  ABP: --  ABP(mean): --  RR: 18 (17 Oct 2021 21:19) (14 - 20)  SpO2: 90% (17 Oct 2021 21:19) (90% - 94%)        10-16 @ 07:01  -  10-17 @ 07:00  --------------------------------------------------------  IN: 354 mL / OUT: 1800 mL / NET: -1446 mL    10-17 @ 07:01  -  10-18 @ 06:50  --------------------------------------------------------  IN: 527 mL / OUT: 550 mL / NET: -23 mL      CAPILLARY BLOOD GLUCOSE      POCT Blood Glucose.: 160 mg/dL (17 Oct 2021 21:27)      PHYSICAL EXAM:  General: NAD, comfortable  HEENT: NCAT, PERRL, clear conjunctiva, no scleral icterus  Neck: supple, no JVD  Respiratory: CTA b/l, no wheezing, rhonchi, rales  Cardiovascular: RRR, normal S1S2, no M/R/G  Vascular: 2+ radial and DP pulses  Abdomen: soft, NT/ND, bowel sounds in all four quadrants, no palpable masses  Extremities: WWP, no clubbing, cyanosis, or edema  Skin: No rashes present  Neuro:     MEDICATIONS:  MEDICATIONS  (STANDING):  amLODIPine   Tablet 10 milliGRAM(s) Oral every 24 hours  chlorhexidine 2% Cloths 1 Application(s) Topical <User Schedule>  dexAMETHasone  Injectable 6 milliGRAM(s) IV Push every 24 hours  dextrose 40% Gel 15 Gram(s) Oral once  dextrose 5%. 1000 milliLiter(s) (50 mL/Hr) IV Continuous <Continuous>  dextrose 5%. 1000 milliLiter(s) (100 mL/Hr) IV Continuous <Continuous>  dextrose 50% Injectable 25 Gram(s) IV Push once  dextrose 50% Injectable 12.5 Gram(s) IV Push once  dextrose 50% Injectable 25 Gram(s) IV Push once  glucagon  Injectable 1 milliGRAM(s) IntraMuscular once  guaiFENesin Oral Liquid (Sugar-Free) 200 milliGRAM(s) Oral every 6 hours  heparin   Injectable 5000 Unit(s) SubCutaneous every 8 hours  insulin glargine Injectable (LANTUS) 24 Unit(s) SubCutaneous at bedtime  insulin lispro (ADMELOG) corrective regimen sliding scale   SubCutaneous Before meals and at bedtime  insulin lispro Injectable (ADMELOG) 8 Unit(s) SubCutaneous three times a day before meals  metoprolol succinate  milliGRAM(s) Oral every 24 hours  NIFEdipine XL 60 milliGRAM(s) Oral every 24 hours  pantoprazole    Tablet 40 milliGRAM(s) Oral before breakfast  simvastatin 20 milliGRAM(s) Oral at bedtime    MEDICATIONS  (PRN):  acetaminophen   Tablet .. 650 milliGRAM(s) Oral every 6 hours PRN Temp greater or equal to 38C (100.4F), Mild Pain (1 - 3)  benzocaine 15 mG/menthol 3.6 mG Lozenge 1 Lozenge Oral every 4 hours PRN Sore Throat      ALLERGIES:  Allergies    Allergy Status Unknown    Intolerances        LABS:                        14.3   11.07 )-----------( 355      ( 17 Oct 2021 08:33 )             44.1     10-17    134<L>  |  101  |  57<H>  ----------------------------<  245<H>  4.8   |  21<L>  |  2.54<H>    Ca    9.1      17 Oct 2021 08:33  Phos  4.3     10-17  Mg     2.0     10-17            RADIOLOGY & ADDITIONAL TESTS: Reviewed.

## 2021-10-18 NOTE — PROGRESS NOTE ADULT - PROBLEM SELECTOR PLAN 4
Presented w/ JUDITH 2/2 ATN in the setting of poor PO intake. Cr now improved an plateaued at approx 2.5. Renal US demonstrated evidence of chronic kidney disease.     - Continue to trend BMP   - Monitor urine output

## 2021-10-18 NOTE — PROGRESS NOTE ADULT - PROBLEM SELECTOR PLAN 5
pt w/ history of hypertension on amlodipine 10mg PO daily, metoprolol ER succinate 100mg PO daily and nifedipine 60mg ED daily    - c/w amlodipine 10mg qd, metoprolol 100mg qd, and nifedipine 60mg qd  - BP parameters in place given patient's bas line HR (50s-60s)

## 2021-10-18 NOTE — PROGRESS NOTE ADULT - PROBLEM SELECTOR PLAN 7
F: None   E: Replete as needed   N: Dash/TLC     DVT proph:   GI proph: Protonix 40mg qd     Code Status: Full   Dispo: RMF Heparin 5000 subQ

## 2021-10-18 NOTE — PROGRESS NOTE ADULT - PROBLEM SELECTOR PLAN 3
Concern for aspiration event on 10/12 after vomiting. Pt became hypoxic w/ new infiltrate on CXR     - S/p vanc/zosyn for treatment   - CXR continues to demonstrate right sided  infiltrate   - Saturating 94% on 4l NC

## 2021-10-19 DIAGNOSIS — J96.01 ACUTE RESPIRATORY FAILURE WITH HYPOXIA: ICD-10-CM

## 2021-10-19 LAB
ANION GAP SERPL CALC-SCNC: 14 MMOL/L — SIGNIFICANT CHANGE UP (ref 5–17)
ANION GAP SERPL CALC-SCNC: 14 MMOL/L — SIGNIFICANT CHANGE UP (ref 5–17)
BASOPHILS # BLD AUTO: 0.03 K/UL — SIGNIFICANT CHANGE UP (ref 0–0.2)
BASOPHILS NFR BLD AUTO: 0.3 % — SIGNIFICANT CHANGE UP (ref 0–2)
BUN SERPL-MCNC: 65 MG/DL — HIGH (ref 7–23)
BUN SERPL-MCNC: 66 MG/DL — HIGH (ref 7–23)
CALCIUM SERPL-MCNC: 8.9 MG/DL — SIGNIFICANT CHANGE UP (ref 8.4–10.5)
CALCIUM SERPL-MCNC: 9.1 MG/DL — SIGNIFICANT CHANGE UP (ref 8.4–10.5)
CHLORIDE SERPL-SCNC: 102 MMOL/L — SIGNIFICANT CHANGE UP (ref 96–108)
CHLORIDE SERPL-SCNC: 98 MMOL/L — SIGNIFICANT CHANGE UP (ref 96–108)
CO2 SERPL-SCNC: 18 MMOL/L — LOW (ref 22–31)
CO2 SERPL-SCNC: 21 MMOL/L — LOW (ref 22–31)
CREAT SERPL-MCNC: 2.44 MG/DL — HIGH (ref 0.5–1.3)
CREAT SERPL-MCNC: 2.49 MG/DL — HIGH (ref 0.5–1.3)
EOSINOPHIL # BLD AUTO: 0.02 K/UL — SIGNIFICANT CHANGE UP (ref 0–0.5)
EOSINOPHIL NFR BLD AUTO: 0.2 % — SIGNIFICANT CHANGE UP (ref 0–6)
GLUCOSE BLDC GLUCOMTR-MCNC: 161 MG/DL — HIGH (ref 70–99)
GLUCOSE BLDC GLUCOMTR-MCNC: 254 MG/DL — HIGH (ref 70–99)
GLUCOSE BLDC GLUCOMTR-MCNC: 304 MG/DL — HIGH (ref 70–99)
GLUCOSE BLDC GLUCOMTR-MCNC: 313 MG/DL — HIGH (ref 70–99)
GLUCOSE SERPL-MCNC: 144 MG/DL — HIGH (ref 70–99)
GLUCOSE SERPL-MCNC: 270 MG/DL — HIGH (ref 70–99)
HCT VFR BLD CALC: 40.7 % — SIGNIFICANT CHANGE UP (ref 34.5–45)
HGB BLD-MCNC: 13.1 G/DL — SIGNIFICANT CHANGE UP (ref 11.5–15.5)
IMM GRANULOCYTES NFR BLD AUTO: 4.7 % — HIGH (ref 0–1.5)
LYMPHOCYTES # BLD AUTO: 1.3 K/UL — SIGNIFICANT CHANGE UP (ref 1–3.3)
LYMPHOCYTES # BLD AUTO: 12.4 % — LOW (ref 13–44)
MAGNESIUM SERPL-MCNC: 2.2 MG/DL — SIGNIFICANT CHANGE UP (ref 1.6–2.6)
MCHC RBC-ENTMCNC: 29 PG — SIGNIFICANT CHANGE UP (ref 27–34)
MCHC RBC-ENTMCNC: 32.2 GM/DL — SIGNIFICANT CHANGE UP (ref 32–36)
MCV RBC AUTO: 90.2 FL — SIGNIFICANT CHANGE UP (ref 80–100)
MONOCYTES # BLD AUTO: 0.3 K/UL — SIGNIFICANT CHANGE UP (ref 0–0.9)
MONOCYTES NFR BLD AUTO: 2.9 % — SIGNIFICANT CHANGE UP (ref 2–14)
NEUTROPHILS # BLD AUTO: 8.36 K/UL — HIGH (ref 1.8–7.4)
NEUTROPHILS NFR BLD AUTO: 79.5 % — HIGH (ref 43–77)
NRBC # BLD: 0 /100 WBCS — SIGNIFICANT CHANGE UP (ref 0–0)
PHOSPHATE SERPL-MCNC: 4.9 MG/DL — HIGH (ref 2.5–4.5)
PLATELET # BLD AUTO: 365 K/UL — SIGNIFICANT CHANGE UP (ref 150–400)
POTASSIUM SERPL-MCNC: 4.5 MMOL/L — SIGNIFICANT CHANGE UP (ref 3.5–5.3)
POTASSIUM SERPL-MCNC: 5.3 MMOL/L — SIGNIFICANT CHANGE UP (ref 3.5–5.3)
POTASSIUM SERPL-SCNC: 4.5 MMOL/L — SIGNIFICANT CHANGE UP (ref 3.5–5.3)
POTASSIUM SERPL-SCNC: 5.3 MMOL/L — SIGNIFICANT CHANGE UP (ref 3.5–5.3)
RBC # BLD: 4.51 M/UL — SIGNIFICANT CHANGE UP (ref 3.8–5.2)
RBC # FLD: 13.7 % — SIGNIFICANT CHANGE UP (ref 10.3–14.5)
SODIUM SERPL-SCNC: 130 MMOL/L — LOW (ref 135–145)
SODIUM SERPL-SCNC: 137 MMOL/L — SIGNIFICANT CHANGE UP (ref 135–145)
WBC # BLD: 10.5 K/UL — SIGNIFICANT CHANGE UP (ref 3.8–10.5)
WBC # FLD AUTO: 10.5 K/UL — SIGNIFICANT CHANGE UP (ref 3.8–10.5)

## 2021-10-19 PROCEDURE — 99232 SBSQ HOSP IP/OBS MODERATE 35: CPT | Mod: GC

## 2021-10-19 RX ORDER — INSULIN GLARGINE 100 [IU]/ML
24 INJECTION, SOLUTION SUBCUTANEOUS AT BEDTIME
Refills: 0 | Status: DISCONTINUED | OUTPATIENT
Start: 2021-10-19 | End: 2021-10-20

## 2021-10-19 RX ORDER — INSULIN LISPRO 100/ML
8 VIAL (ML) SUBCUTANEOUS
Refills: 0 | Status: DISCONTINUED | OUTPATIENT
Start: 2021-10-19 | End: 2021-10-20

## 2021-10-19 RX ADMIN — Medication 200 MILLIGRAM(S): at 17:35

## 2021-10-19 RX ADMIN — PANTOPRAZOLE SODIUM 40 MILLIGRAM(S): 20 TABLET, DELAYED RELEASE ORAL at 06:05

## 2021-10-19 RX ADMIN — Medication 8: at 17:07

## 2021-10-19 RX ADMIN — Medication 8 UNIT(S): at 17:08

## 2021-10-19 RX ADMIN — HEPARIN SODIUM 5000 UNIT(S): 5000 INJECTION INTRAVENOUS; SUBCUTANEOUS at 06:08

## 2021-10-19 RX ADMIN — Medication 5 UNIT(S): at 07:30

## 2021-10-19 RX ADMIN — BENZOCAINE AND MENTHOL 1 LOZENGE: 5; 1 LIQUID ORAL at 17:34

## 2021-10-19 RX ADMIN — Medication 6: at 07:30

## 2021-10-19 RX ADMIN — SIMVASTATIN 20 MILLIGRAM(S): 20 TABLET, FILM COATED ORAL at 22:34

## 2021-10-19 RX ADMIN — Medication 200 MILLIGRAM(S): at 00:04

## 2021-10-19 RX ADMIN — HEPARIN SODIUM 5000 UNIT(S): 5000 INJECTION INTRAVENOUS; SUBCUTANEOUS at 22:33

## 2021-10-19 RX ADMIN — SENNA PLUS 2 TABLET(S): 8.6 TABLET ORAL at 22:33

## 2021-10-19 RX ADMIN — HEPARIN SODIUM 5000 UNIT(S): 5000 INJECTION INTRAVENOUS; SUBCUTANEOUS at 13:31

## 2021-10-19 RX ADMIN — Medication 100 MILLIGRAM(S): at 19:37

## 2021-10-19 RX ADMIN — INSULIN GLARGINE 24 UNIT(S): 100 INJECTION, SOLUTION SUBCUTANEOUS at 22:00

## 2021-10-19 RX ADMIN — Medication 8: at 13:29

## 2021-10-19 RX ADMIN — Medication 2: at 23:21

## 2021-10-19 RX ADMIN — CHLORHEXIDINE GLUCONATE 1 APPLICATION(S): 213 SOLUTION TOPICAL at 06:06

## 2021-10-19 RX ADMIN — Medication 5 UNIT(S): at 13:31

## 2021-10-19 NOTE — PROGRESS NOTE ADULT - SUBJECTIVE AND OBJECTIVE BOX
***ACCEPTED TRANSFER FROM [facility] TO [facility]***    Hospital Course:  84 YO F with PMHx T2DM, HLD, HTN presented to [facility] ED on 10/9 with 1w Hx of generalized weakness, fatigue, SOB, decreased appetite, and cough in the setting of positive COVID PCR as outpatient. Patient reports no recent travel however has been in contact with her son who recently tested COVID positive 1 week prior. Pt saw her PMD (Dr. Knight [phone]) who reportedly told her to take mucinex and prescribed erythromycin for symptoms 3d ago. Pt was notified today that COVID test came back positive, told to come to ED. Of note, patient received both doses of the Pfizer COVID vaccine in Jan/Feb. Pt received flu shot in September. Patient was stepped down from 7L on 10/12 on 6L NC saturating in the low 90s. Late 10/12 the patient had an aspiration event when she vomited. At that time she became hypoxic to 88%, requiring O2 escalation to HFNC. She remained hypoxic w. increased work of breathing despite proning and HFNC. Pt was, therefore, transferred to the ICU for acute respiratory failure 2/2 COVID w/ superimposed aspiration pneumonia. Pt remained stable in the MICU w/o pressure support or mechanical ventilation. S/p vancomycin and zosyn treatment. She was stepped down to 7LA, where she was successfully weaned form HFNC 60/60 to 4l NC. Pt stable for transfer to [facility].     SUBJECTIVE / INTERVAL HPI: Patient seen and examined at bedside this evening. Patient reports she feels well. She has been using the supplemental oxygen at rest and while walking with PT. She denies SOB when walking with PT or at rest, even after they decreased her O2 to 4L today. She only has a persistent cough which is nonproductive. She also complains that she is having trouble finding palatable food and has had to change the diet frequently but she feels the options are too limited. She denies fever, chills, n/v, CP, cough, palpitations, abd pain, diarrhea, constipation.     ROS: negative unless otherwise stated above.    VITAL SIGNS:  Vital Signs Last 24 Hrs  T(C): 36.6 (19 Oct 2021 16:52), Max: 36.6 (19 Oct 2021 14:29)  T(F): 97.8 (19 Oct 2021 16:52), Max: 97.9 (19 Oct 2021 14:29)  HR: 70 (19 Oct 2021 17:22) (50 - 78)  BP: 151/71 (19 Oct 2021 16:52) (117/60 - 158/70)  BP(mean): 95 (19 Oct 2021 16:52) (82 - 101)  RR: 18 (19 Oct 2021 17:22) (16 - 18)  SpO2: 94% (19 Oct 2021 17:22) (92% - 96%)      10-18-21 @ 07:01  -  10-19-21 @ 07:00  --------------------------------------------------------  IN: 752 mL / OUT: 1150 mL / NET: -398 mL    10-19-21 @ 07:01  -  10-19-21 @ 20:20  --------------------------------------------------------  IN: 640 mL / OUT: 800 mL / NET: -160 mL        PHYSICAL EXAM:    General: NAD, sitting in chair eating dinner  HEENT: MMM, EOMI, no scleral icterus, no nasal discharge  Neck: supple, no carotid bruits  Cardiovascular: +S1/S2; RRR, no M/R/G  Respiratory: slightly decreased BS on right, otherwise CTA B/L; no W/R/R, no accessory musc use  Gastrointestinal: soft, NT/ND  Extremities: moving all extremities; WWP; no edema, clubbing or cyanosis  Vascular: 2+ radial, carotid pulses B/L  Neurological: AAOx3; CN II-XII intact, UE strength 5/5 b/l, LE strength 5/5 b/l    MEDICATIONS:  MEDICATIONS  (STANDING):  amLODIPine   Tablet 10 milliGRAM(s) Oral every 24 hours  chlorhexidine 2% Cloths 1 Application(s) Topical <User Schedule>  dextrose 40% Gel 15 Gram(s) Oral once  dextrose 5%. 1000 milliLiter(s) (50 mL/Hr) IV Continuous <Continuous>  dextrose 5%. 1000 milliLiter(s) (100 mL/Hr) IV Continuous <Continuous>  dextrose 50% Injectable 25 Gram(s) IV Push once  dextrose 50% Injectable 12.5 Gram(s) IV Push once  dextrose 50% Injectable 25 Gram(s) IV Push once  glucagon  Injectable 1 milliGRAM(s) IntraMuscular once  guaiFENesin Oral Liquid (Sugar-Free) 200 milliGRAM(s) Oral every 6 hours  heparin   Injectable 5000 Unit(s) SubCutaneous every 8 hours  insulin glargine Injectable (LANTUS) 24 Unit(s) SubCutaneous at bedtime  insulin lispro (ADMELOG) corrective regimen sliding scale   SubCutaneous Before meals and at bedtime  insulin lispro Injectable (ADMELOG) 8 Unit(s) SubCutaneous three times a day before meals  metoprolol succinate  milliGRAM(s) Oral every 24 hours  NIFEdipine XL 60 milliGRAM(s) Oral every 24 hours  polyethylene glycol 3350 17 Gram(s) Oral daily  senna 2 Tablet(s) Oral at bedtime  simvastatin 20 milliGRAM(s) Oral at bedtime    MEDICATIONS  (PRN):  acetaminophen   Tablet .. 650 milliGRAM(s) Oral every 6 hours PRN Temp greater or equal to 38C (100.4F), Mild Pain (1 - 3)  benzocaine 15 mG/menthol 3.6 mG Lozenge 1 Lozenge Oral every 4 hours PRN Sore Throat      ALLERGIES:  Allergies    Allergy Status Unknown    Intolerances        LABS:                        13.1   10.50 )-----------( 365      ( 19 Oct 2021 08:31 )             40.7     10-19    130<L>  |  98  |  66<H>  ----------------------------<  270<H>  5.3   |  18<L>  |  2.49<H>    Ca    8.9      19 Oct 2021 08:31  Phos  4.9     10-19  Mg     2.2     10-19    TPro  7.2  /  Alb  3.3  /  TBili  0.2  /  DBili  x   /  AST  14  /  ALT  11  /  AlkPhos  48  10-18        CAPILLARY BLOOD GLUCOSE      POCT Blood Glucose.: 304 mg/dL (19 Oct 2021 16:53)      RADIOLOGY & ADDITIONAL TESTS: Reviewed.

## 2021-10-19 NOTE — PROGRESS NOTE ADULT - ATTENDING COMMENTS
Patient seen and examined with house-staff during bedside rounds.  Resident note read, including vitals, physical findings, laboratory data, and radiological reports.   Revisions included below.  Direct personal management at bed side and extensive interpretation of the data.  Plan was outlined and discussed in details with the housestaff.  Decision making of high complexity  Action taken for acute disease activity to reflect the level of care provided:  - medication reconciliation  - review laboratory data  Patient is stable.  The patient will transfer to regular floor.  The patient responding to Covid pneumonia.  Can tinea Decadron.  Continue weaning from oxygen supplementation.  Patient was on DVT prophylaxis

## 2021-10-19 NOTE — PROGRESS NOTE ADULT - PROBLEM SELECTOR PLAN 4
A1C 7.1% this visit. Patient has been hyperglycemic with -300s. Home meds: glimepiride 2mg BID and metformin 1g daily. Patient s/p steroid course which ended on 10/18 and still hyperglycemic. Also with waxing and waning PO intake as per patient.   - c/w 24U Lantus at bedtime tonight  - c/w 8U before meals  - monitor AM fingerstick and adjust insulin based on last 24hrs of insulin requirements  - patient received 5/5/8U lispro w/ meals today  - patient received 6/8/8U lispro (sliding scale) today  - -180  - Pt will need insulin adjustments  - nutrition consult, as patient struggling to find appropriate diet, also will need stable diet to ensure strong glycemic control Pt w/ history of hypertension. systolic -150s last 24hrs. Home meds: amlodipine 10mg PO daily, metoprolol ER succinate 100mg PO daily and nifedipine 60mg ED daily  - c/w amlodipine 10mg qd  - c/w toprol 100mg qd  - c/w nifedipine 60mg qd  - ordered with BP and HR parameters (patient's baseline HR 50s-60s)

## 2021-10-19 NOTE — PROGRESS NOTE ADULT - SUBJECTIVE AND OBJECTIVE BOX
Transfer 7L to [facility]     Hospital Course     84 YO F with PMHx T2DM, HLD, HTN presented to [facility] ED on 10/9 with 1w Hx of generalized weakness, fatigue, SOB, decreased appetite, and cough in the setting of positive COVID PCR as outpatient. Patient reports no recent travel however has been in contact with her son who recently tested COVID positive 1 week prior. Pt saw her PMD (Dr. Knight [phone]) who reportedly told her to take mucinex and prescribed erythromycin for symptoms 3d ago. Pt was notified today that COVID test came back positive, told to come to ED. Of note, patient received both doses of the Pfizer COVID vaccine in Jan/Feb. Pt received flu shot in September. Patient was stepped down from 7L on 10/12 on 6L NC saturating in the low 90s. Late 10/12 the patient had an aspiration event when she vomited. At that time she became hypoxic to 88%, requiring O2 escalation to HFNC. She remained hypoxic w. increased work of breathing despite proning and HFNC. Pt was, therefore, transferred to the ICU for acute respiratory failure 2/2 COVID w/ superimposed aspiration pneumonia. Pt remained stable in the MICU w/o pressure support or mechanical ventilation. S/p vancomycin and zosyn treatment. She was stepped down to 7LA, where she was successfully weaned form HFNC 60/60 to 4l NC. Pt stable for transfer to [facility].       INTERVAL HPI/OVERNIGHT EVENTS:    OVERNIGHT: No overnight events. Pt afebrile and hemodynamically stable     SUBJECTIVE: Patient seen and examined at bedside. No complaints.    ROS:  CV: Denies chest pain  Resp: Denies SOB  GI: Denies abdominal pain, constipation, diarrhea, nausea, vomiting  : Denies dysuria  ID: Denies fevers, chills  MSK: Denies joint pain     OBJECTIVE:    VITAL SIGNS:  ICU Vital Signs Last 24 Hrs  T(C): 36.2 (19 Oct 2021 05:06), Max: 36.4 (18 Oct 2021 17:48)  T(F): 97.1 (19 Oct 2021 05:06), Max: 97.5 (18 Oct 2021 17:48)  HR: 50 (19 Oct 2021 12:55) (50 - 86)  BP: 151/70 (19 Oct 2021 12:55) (117/60 - 151/70)  BP(mean): 99 (19 Oct 2021 12:55) (82 - 99)  ABP: --  ABP(mean): --  RR: 16 (19 Oct 2021 12:55) (16 - 19)  SpO2: 94% (19 Oct 2021 12:55) (92% - 96%)        10-18 @ 07:01  -  10-19 @ 07:00  --------------------------------------------------------  IN: 752 mL / OUT: 1150 mL / NET: -398 mL    10-19 @ 07:01  -  10-19 @ 14:12  --------------------------------------------------------  IN: 420 mL / OUT: 0 mL / NET: 420 mL      CAPILLARY BLOOD GLUCOSE      POCT Blood Glucose.: 313 mg/dL (19 Oct 2021 13:24)      PHYSICAL EXAM:  General: NAD, comfortably sitting in bed   HEENT: PERRL, clear conjunctiva, no scleral icterus  Neck: supple, no JVD  Respiratory: CTAB, no accessory muscle use, no increased work of breathing   Cardiovascular: RRR, normal S1S2, no M/R/G  Vascular: 2+ radial and DP pulses  Abdomen: soft, NT/ND, bowel sounds in all four quadrants, no palpable masses  Extremities: WWP, no clubbing, cyanosis, or edema  Skin: No rashes present  Neuro: A&Ox3, no focal deficits     MEDICATIONS:  MEDICATIONS  (STANDING):  amLODIPine   Tablet 10 milliGRAM(s) Oral every 24 hours  chlorhexidine 2% Cloths 1 Application(s) Topical <User Schedule>  dextrose 40% Gel 15 Gram(s) Oral once  dextrose 5%. 1000 milliLiter(s) (50 mL/Hr) IV Continuous <Continuous>  dextrose 5%. 1000 milliLiter(s) (100 mL/Hr) IV Continuous <Continuous>  dextrose 50% Injectable 25 Gram(s) IV Push once  dextrose 50% Injectable 12.5 Gram(s) IV Push once  dextrose 50% Injectable 25 Gram(s) IV Push once  glucagon  Injectable 1 milliGRAM(s) IntraMuscular once  guaiFENesin Oral Liquid (Sugar-Free) 200 milliGRAM(s) Oral every 6 hours  heparin   Injectable 5000 Unit(s) SubCutaneous every 8 hours  insulin glargine Injectable (LANTUS) 12 Unit(s) SubCutaneous at bedtime  insulin lispro (ADMELOG) corrective regimen sliding scale   SubCutaneous Before meals and at bedtime  insulin lispro Injectable (ADMELOG) 5 Unit(s) SubCutaneous three times a day before meals  metoprolol succinate  milliGRAM(s) Oral every 24 hours  NIFEdipine XL 60 milliGRAM(s) Oral every 24 hours  polyethylene glycol 3350 17 Gram(s) Oral daily  senna 2 Tablet(s) Oral at bedtime  simvastatin 20 milliGRAM(s) Oral at bedtime    MEDICATIONS  (PRN):  acetaminophen   Tablet .. 650 milliGRAM(s) Oral every 6 hours PRN Temp greater or equal to 38C (100.4F), Mild Pain (1 - 3)  benzocaine 15 mG/menthol 3.6 mG Lozenge 1 Lozenge Oral every 4 hours PRN Sore Throat      ALLERGIES:  Allergies    Allergy Status Unknown    Intolerances        LABS:                        13.1   10.50 )-----------( 365      ( 19 Oct 2021 08:31 )             40.7     10-19    130<L>  |  98  |  66<H>  ----------------------------<  270<H>  5.3   |  18<L>  |  2.49<H>    Ca    8.9      19 Oct 2021 08:31  Phos  4.9     10-19  Mg     2.2     10-19    TPro  7.2  /  Alb  3.3  /  TBili  0.2  /  DBili  x   /  AST  14  /  ALT  11  /  AlkPhos  48  10-18          RADIOLOGY & ADDITIONAL TESTS: Reviewed.

## 2021-10-19 NOTE — PROGRESS NOTE ADULT - PROBLEM SELECTOR PLAN 1
Pt admitted w/ acute hypoxic respiratory failure 2/2 respiratory failure in the setting of covid. Weaned from HFNC to NC but became acutely hypoxic requiring HFNC in the setting of presumed aspiration pneumonia. s/p treatment for aspiration pna and now weaned from HFNC to 4L NC. CXR 10/18 w/ b/l infiltrates. Last BCx 10/13 no growth.   - Weaned from HFNC to NC 4L  - Saturating 92-94% on 4L NC    - wean O2 as tolerated  - monitor O2 ambulatory requirements and set up home O2 if necessary

## 2021-10-19 NOTE — PROGRESS NOTE ADULT - ASSESSMENT
84 YO F with PMHx T2DM, HLD, HTN presented to [facility] ED on 10/9 with 1w Hx of generalized weakness, fatigue, SOB, decreased appetite, and cough in the setting of positive COVID PCR. Admitted for hypoxemia 2/2 COVID PNA.

## 2021-10-19 NOTE — PROGRESS NOTE ADULT - PROBLEM SELECTOR PLAN 6
Pt home meds include glimepiride 2mg BID and metformin 1g daily. Treated w/ inuslin while on steroids.       - Pt hyperglycemic during hospitalization in the setting of steroid use   - insulin regimen decreased to 8U Lantus at bedtime and 7U premeal due to completed steroid course  - continue to monitor FS glucose

## 2021-10-19 NOTE — PROGRESS NOTE ADULT - PROBLEM SELECTOR PLAN 8
F: None   E: Replete as needed   N: consistent carb    DVT proph:   GI proph: heparin subq 5000u q8    Code Status: Full   Dispo: PT rec home w/ outpatient pulmonary rehab

## 2021-10-19 NOTE — PROGRESS NOTE ADULT - PROBLEM SELECTOR PLAN 1
Pt admitted w/ acute hypoxic respiratory failure 2/2 respiratory failure. Weaned from HFNC to NC but became acutely hypoxic requiring HFNC in the setting of presumed aspiration pneumonia.     - S/p vanc/zosyn for aspiration pneumonia   - Right sided infiltrate still present on CXR  - Weaned from HFNC to NC   - Saturating 94% on 4L NC   - Obtain ambulatory O2 saturation   - Continue to wean O2 as tolerated

## 2021-10-19 NOTE — PROGRESS NOTE ADULT - PROBLEM SELECTOR PLAN 5
Pt w/ history of hypertension. systolic -150s last 24hrs. Home meds: amlodipine 10mg PO daily, metoprolol ER succinate 100mg PO daily and nifedipine 60mg ED daily  - c/w amlodipine 10mg qd  - c/w toprol 100mg qd  - c/w nifedipine 60mg qd  - order with BP and HR parameters (patient's baseline HR 50s-60s) Pt w/ persistent hyponatremia in the setting of Covid and decreased overall PO intake. Na 130 today. Pt is tolerating PO and has not received IV fluids recently. Pt appears euvolemic on exam.  - encourage PO intake  - monitor Na daily  - minimize IV fluids in setting of covid  - f/u 10pm BMP tonight

## 2021-10-19 NOTE — PROGRESS NOTE ADULT - PROBLEM SELECTOR PLAN 2
COVID-19 infection in the setting of complete vaccination.     - Completed 10 day course of Dexamethasone on 10/18  - Pt on Protonix while receiving steroid treatment   - Remdesivir held due to impaired renal function   - Saturating 94% on 4L NC   - Wean O2 as tolerated   - Tylenol prn for fever

## 2021-10-19 NOTE — PROGRESS NOTE ADULT - PROBLEM SELECTOR PLAN 3
Concern for aspiration event on 10/12 after vomiting. Pt became hypoxic w/ new infiltrate on CXR. S/p vanc/zosyn (ended 10/15) for aspiration pna treatment. CXR 10/18 continues to demonstrate infiltrates.   - monitor O2 requirements  - pt w/ persistent cough, using cepacol PRN A1C 7.1% this visit. Patient has been hyperglycemic with -300s,s/p decadron course which ended on 10/18. Home meds: glimepiride 2mg BID and metformin 1g daily. Also with waxing and waning PO intake as per patient.   - 24U Lantus tonight  - 8U lispro before meals  - Endocrine consult in AM**  - patient received 5/5/8U lispro w/ meals today and 6/8/8U lispro (sliding scale) today  - -180  - nutrition follow up  - f/u fingerstick tonight

## 2021-10-19 NOTE — PROGRESS NOTE ADULT - ASSESSMENT
Patient is an 84yo female w/ PMH of T2DM, HLD, HTN, who presented to [facility] ED on 10/9 with 1w Hx of generalized weakness, fatigue, SOB, decreased appetite, and cough in the setting of positive COVID PCR. Admitted for hypoxemia 2/2 COVID PNA. Patient now improving with decreased oxygen requirements and is being stepped down to RMF.

## 2021-10-19 NOTE — PROGRESS NOTE ADULT - PROBLEM SELECTOR PLAN 2
COVID-19 pneumonia in the setting of complete vaccination. Completed 10 day course of Dexamethasone on 10/18  - Remdesivir was held due to impaired renal function   - Saturating 92-94% on 4L NC   - wean O2 as tolerated  - Tylenol prn for fever COVID-19 pneumonia in the setting of complete vaccination. Completed 10 day course of Dexamethasone on 10/18. Last covid test on 10/18 was positive.  - Remdesivir was held due to impaired renal function   - Saturating 92-94% on 4L NC   - wean O2 as tolerated  - Tylenol prn for fever

## 2021-10-20 DIAGNOSIS — E78.5 HYPERLIPIDEMIA, UNSPECIFIED: ICD-10-CM

## 2021-10-20 LAB
ANION GAP SERPL CALC-SCNC: 13 MMOL/L — SIGNIFICANT CHANGE UP (ref 5–17)
ANISOCYTOSIS BLD QL: SLIGHT — SIGNIFICANT CHANGE UP
BASOPHILS # BLD AUTO: 0 K/UL — SIGNIFICANT CHANGE UP (ref 0–0.2)
BASOPHILS NFR BLD AUTO: 0 % — SIGNIFICANT CHANGE UP (ref 0–2)
BUN SERPL-MCNC: 62 MG/DL — HIGH (ref 7–23)
BURR CELLS BLD QL SMEAR: PRESENT — SIGNIFICANT CHANGE UP
CALCIUM SERPL-MCNC: 9.2 MG/DL — SIGNIFICANT CHANGE UP (ref 8.4–10.5)
CHLORIDE SERPL-SCNC: 101 MMOL/L — SIGNIFICANT CHANGE UP (ref 96–108)
CHOLEST SERPL-MCNC: 213 MG/DL — HIGH
CO2 SERPL-SCNC: 24 MMOL/L — SIGNIFICANT CHANGE UP (ref 22–31)
CREAT SERPL-MCNC: 2.46 MG/DL — HIGH (ref 0.5–1.3)
EOSINOPHIL # BLD AUTO: 0.32 K/UL — SIGNIFICANT CHANGE UP (ref 0–0.5)
EOSINOPHIL NFR BLD AUTO: 2.7 % — SIGNIFICANT CHANGE UP (ref 0–6)
GIANT PLATELETS BLD QL SMEAR: PRESENT — SIGNIFICANT CHANGE UP
GLUCOSE BLDC GLUCOMTR-MCNC: 208 MG/DL — HIGH (ref 70–99)
GLUCOSE BLDC GLUCOMTR-MCNC: 236 MG/DL — HIGH (ref 70–99)
GLUCOSE BLDC GLUCOMTR-MCNC: 303 MG/DL — HIGH (ref 70–99)
GLUCOSE BLDC GLUCOMTR-MCNC: 83 MG/DL — SIGNIFICANT CHANGE UP (ref 70–99)
GLUCOSE BLDC GLUCOMTR-MCNC: 87 MG/DL — SIGNIFICANT CHANGE UP (ref 70–99)
GLUCOSE SERPL-MCNC: 84 MG/DL — SIGNIFICANT CHANGE UP (ref 70–99)
HCT VFR BLD CALC: 43.4 % — SIGNIFICANT CHANGE UP (ref 34.5–45)
HDLC SERPL-MCNC: 49 MG/DL — LOW
HGB BLD-MCNC: 13.6 G/DL — SIGNIFICANT CHANGE UP (ref 11.5–15.5)
LIPID PNL WITH DIRECT LDL SERPL: 115 MG/DL — HIGH
LYMPHOCYTES # BLD AUTO: 17.3 % — SIGNIFICANT CHANGE UP (ref 13–44)
LYMPHOCYTES # BLD AUTO: 2.02 K/UL — SIGNIFICANT CHANGE UP (ref 1–3.3)
MACROCYTES BLD QL: SLIGHT — SIGNIFICANT CHANGE UP
MAGNESIUM SERPL-MCNC: 2.3 MG/DL — SIGNIFICANT CHANGE UP (ref 1.6–2.6)
MANUAL SMEAR VERIFICATION: SIGNIFICANT CHANGE UP
MCHC RBC-ENTMCNC: 28.7 PG — SIGNIFICANT CHANGE UP (ref 27–34)
MCHC RBC-ENTMCNC: 31.3 GM/DL — LOW (ref 32–36)
MCV RBC AUTO: 91.6 FL — SIGNIFICANT CHANGE UP (ref 80–100)
METAMYELOCYTES # FLD: 1.8 % — HIGH (ref 0–0)
MICROCYTES BLD QL: SLIGHT — SIGNIFICANT CHANGE UP
MONOCYTES # BLD AUTO: 0.64 K/UL — SIGNIFICANT CHANGE UP (ref 0–0.9)
MONOCYTES NFR BLD AUTO: 5.5 % — SIGNIFICANT CHANGE UP (ref 2–14)
MYELOCYTES NFR BLD: 0.9 % — HIGH (ref 0–0)
NEUTROPHILS # BLD AUTO: 8.38 K/UL — HIGH (ref 1.8–7.4)
NEUTROPHILS NFR BLD AUTO: 70.9 % — SIGNIFICANT CHANGE UP (ref 43–77)
NEUTS BAND # BLD: 0.9 % — SIGNIFICANT CHANGE UP (ref 0–8)
NON HDL CHOLESTEROL: 164 MG/DL — HIGH
OVALOCYTES BLD QL SMEAR: SLIGHT — SIGNIFICANT CHANGE UP
PHOSPHATE SERPL-MCNC: 3.7 MG/DL — SIGNIFICANT CHANGE UP (ref 2.5–4.5)
PLAT MORPH BLD: ABNORMAL
PLATELET # BLD AUTO: 291 K/UL — SIGNIFICANT CHANGE UP (ref 150–400)
POIKILOCYTOSIS BLD QL AUTO: SIGNIFICANT CHANGE UP
POTASSIUM SERPL-MCNC: 4.6 MMOL/L — SIGNIFICANT CHANGE UP (ref 3.5–5.3)
POTASSIUM SERPL-SCNC: 4.6 MMOL/L — SIGNIFICANT CHANGE UP (ref 3.5–5.3)
RBC # BLD: 4.74 M/UL — SIGNIFICANT CHANGE UP (ref 3.8–5.2)
RBC # FLD: 14.1 % — SIGNIFICANT CHANGE UP (ref 10.3–14.5)
RBC BLD AUTO: ABNORMAL
SMUDGE CELLS # BLD: PRESENT — SIGNIFICANT CHANGE UP
SODIUM SERPL-SCNC: 138 MMOL/L — SIGNIFICANT CHANGE UP (ref 135–145)
SPHEROCYTES BLD QL SMEAR: SLIGHT — SIGNIFICANT CHANGE UP
TRIGL SERPL-MCNC: 247 MG/DL — HIGH
WBC # BLD: 11.67 K/UL — HIGH (ref 3.8–10.5)
WBC # FLD AUTO: 11.67 K/UL — HIGH (ref 3.8–10.5)

## 2021-10-20 PROCEDURE — 99233 SBSQ HOSP IP/OBS HIGH 50: CPT | Mod: GC

## 2021-10-20 RX ORDER — INSULIN LISPRO 100/ML
10 VIAL (ML) SUBCUTANEOUS
Refills: 0 | Status: DISCONTINUED | OUTPATIENT
Start: 2021-10-20 | End: 2021-10-21

## 2021-10-20 RX ORDER — INSULIN GLARGINE 100 [IU]/ML
18 INJECTION, SOLUTION SUBCUTANEOUS AT BEDTIME
Refills: 0 | Status: DISCONTINUED | OUTPATIENT
Start: 2021-10-20 | End: 2021-10-21

## 2021-10-20 RX ADMIN — Medication 100 MILLIGRAM(S): at 19:50

## 2021-10-20 RX ADMIN — Medication 4: at 21:58

## 2021-10-20 RX ADMIN — Medication 8: at 17:07

## 2021-10-20 RX ADMIN — Medication 10 UNIT(S): at 13:34

## 2021-10-20 RX ADMIN — HEPARIN SODIUM 5000 UNIT(S): 5000 INJECTION INTRAVENOUS; SUBCUTANEOUS at 06:32

## 2021-10-20 RX ADMIN — Medication 200 MILLIGRAM(S): at 22:25

## 2021-10-20 RX ADMIN — Medication 4: at 13:33

## 2021-10-20 RX ADMIN — Medication 10 UNIT(S): at 17:07

## 2021-10-20 RX ADMIN — Medication 200 MILLIGRAM(S): at 17:29

## 2021-10-20 RX ADMIN — Medication 60 MILLIGRAM(S): at 13:03

## 2021-10-20 RX ADMIN — POLYETHYLENE GLYCOL 3350 17 GRAM(S): 17 POWDER, FOR SOLUTION ORAL at 13:03

## 2021-10-20 RX ADMIN — Medication 200 MILLIGRAM(S): at 06:32

## 2021-10-20 RX ADMIN — HEPARIN SODIUM 5000 UNIT(S): 5000 INJECTION INTRAVENOUS; SUBCUTANEOUS at 21:59

## 2021-10-20 RX ADMIN — Medication 200 MILLIGRAM(S): at 13:03

## 2021-10-20 RX ADMIN — INSULIN GLARGINE 18 UNIT(S): 100 INJECTION, SOLUTION SUBCUTANEOUS at 21:59

## 2021-10-20 RX ADMIN — BENZOCAINE AND MENTHOL 1 LOZENGE: 5; 1 LIQUID ORAL at 01:22

## 2021-10-20 RX ADMIN — AMLODIPINE BESYLATE 10 MILLIGRAM(S): 2.5 TABLET ORAL at 07:17

## 2021-10-20 RX ADMIN — HEPARIN SODIUM 5000 UNIT(S): 5000 INJECTION INTRAVENOUS; SUBCUTANEOUS at 13:04

## 2021-10-20 RX ADMIN — SIMVASTATIN 20 MILLIGRAM(S): 20 TABLET, FILM COATED ORAL at 22:00

## 2021-10-20 RX ADMIN — Medication 200 MILLIGRAM(S): at 01:22

## 2021-10-20 NOTE — PROGRESS NOTE ADULT - ASSESSMENT
84yo F w/ PMH of T2DM, HLD and HTN, who found to have COVID PNA on 10/9, originally on 6L NC, h/c complicated with aspiration PNA, patient stepped up requiring HFNC, now wean off HFNC, on 2L NC, s/p Vanc+Zosyn and Decadron. Stepped down to RMF on 10/19 awaiting home O2 setup.

## 2021-10-20 NOTE — PROGRESS NOTE ADULT - PROBLEM SELECTOR PLAN 2
COVID-19 pneumonia in the setting of complete vaccination. Completed 10 day course of Dexamethasone on 10/18. Last covid test on 10/18 was positive.  - Remdesivir was held due to impaired renal function   - wean O2 as tolerated  - Tylenol prn for fever

## 2021-10-20 NOTE — PROGRESS NOTE ADULT - PROBLEM SELECTOR PLAN 5
RESOLVED  Na today 138  Pt w/ persistent hyponatremia in the setting of Covid and decreased overall PO intake. Na 130 today. Pt is tolerating PO and has not received IV fluids recently. Pt appears euvolemic on exam.  - encourage PO intake Patient with elevated lipid profile, total cholesterol of 213, , HDL 49. On Simvastatin 20mg daily  - ASCVD risk 11.2%  - Outpatient recommendation to increase simvastatin

## 2021-10-20 NOTE — PROGRESS NOTE ADULT - PROBLEM SELECTOR PLAN 7
F: None   E: Replete as needed   N: regular    DVT proph:   GI proph: heparin subq 5000u q8    Code Status: Full   Dispo: PT rec home w/ outpatient pulmonary rehab RESOLVED  Patient is probably back to baseline with Cr 2.4 range, CKD stage 4?  - will get collateral from PCP for accurate Cr baseline     Presented w/ JUDITH (Cr 4.58 on admission) 2/2 ATN in the setting of poor PO intake. FeNa 1.2% (intrinsic) on 10/10. Cr now improved, 2.49 today. Renal US demonstrated evidence of chronic kidney disease.   - trend creatinine  - Monitor urine output  - encourage PO intake

## 2021-10-20 NOTE — PROGRESS NOTE ADULT - ATTENDING COMMENTS
84yo F w/ PMH of T2DM, HLD and HTN, who found to have COVID PNA on 10/9, originally on 6L NC, h/c complicated with aspiration PNA, patient stepped up requiring HFNC, now wean off HFNC, on 2L NC, s/p Vanc+Zosyn and Decadron. Stepped down to RMF on 10/19 awaiting home O2 setup.    Labs and imaging reviewed.     Problem List:  1. Acute Hypoxic Resp Failure  2. COVID19 PNA  3. DM2  4. HLD  5. HTN  6. GNR PNA - resolved    Plan:  -Start set up of Home O2, please see chart note  -Continue IS, ambulation, and titration as tolerated.     Anticipated D/C tomorrow

## 2021-10-20 NOTE — PROGRESS NOTE ADULT - PROBLEM SELECTOR PLAN 8
F: None   E: Replete as needed   N: regular    DVT proph:   GI proph: heparin subq 5000u q8    Code Status: Full   Dispo: PT rec home w/ outpatient pulmonary rehab

## 2021-10-20 NOTE — CHART NOTE - NSCHARTNOTEFT_GEN_A_CORE
Admitting Diagnosis:   Patient is a 85y old  Female who presents with a chief complaint of COVID PNA (19 Oct 2021 06:59)      PAST MEDICAL & SURGICAL HISTORY:  Diabetes    HTN (hypertension)    No significant past surgical history        Current Nutrition Order:  DASH consCHO Renal lacto-ovo Kosher diet    PO Intake: Good (%) [ X]  Fair (50-75%) [   ] Poor (<25%) [   ]    GI Issues:   Senna Miralax ordered   +BM10/19    Pain:  none per flow sheets     Skin:  no edema/pressure ulcers noted at this time  Real 19      Labs:   10-19    130<L>  |  98  |  66<H>  ----------------------------<  270<H>  5.3   |  18<L>  |  2.49<H>    Ca    8.9      19 Oct 2021 08:31  Phos  4.9     10-19  Mg     2.2     10-19    TPro  7.2  /  Alb  3.3  /  TBili  0.2  /  DBili  x   /  AST  14  /  ALT  11  /  AlkPhos  48  10-18    CAPILLARY BLOOD GLUCOSE      POCT Blood Glucose.: 304 mg/dL (19 Oct 2021 16:53)  POCT Blood Glucose.: 313 mg/dL (19 Oct 2021 13:24)  POCT Blood Glucose.: 254 mg/dL (19 Oct 2021 07:04)  POCT Blood Glucose.: 272 mg/dL (18 Oct 2021 21:25)  POCT Blood Glucose.: 269 mg/dL (18 Oct 2021 17:36)      Medications:  MEDICATIONS  (STANDING):  amLODIPine   Tablet 10 milliGRAM(s) Oral every 24 hours  chlorhexidine 2% Cloths 1 Application(s) Topical <User Schedule>  dextrose 40% Gel 15 Gram(s) Oral once  dextrose 5%. 1000 milliLiter(s) (50 mL/Hr) IV Continuous <Continuous>  dextrose 5%. 1000 milliLiter(s) (100 mL/Hr) IV Continuous <Continuous>  dextrose 50% Injectable 25 Gram(s) IV Push once  dextrose 50% Injectable 12.5 Gram(s) IV Push once  dextrose 50% Injectable 25 Gram(s) IV Push once  glucagon  Injectable 1 milliGRAM(s) IntraMuscular once  guaiFENesin Oral Liquid (Sugar-Free) 200 milliGRAM(s) Oral every 6 hours  heparin   Injectable 5000 Unit(s) SubCutaneous every 8 hours  insulin glargine Injectable (LANTUS) 24 Unit(s) SubCutaneous at bedtime  insulin lispro (ADMELOG) corrective regimen sliding scale   SubCutaneous Before meals and at bedtime  insulin lispro Injectable (ADMELOG) 8 Unit(s) SubCutaneous three times a day before meals  metoprolol succinate  milliGRAM(s) Oral every 24 hours  NIFEdipine XL 60 milliGRAM(s) Oral every 24 hours  polyethylene glycol 3350 17 Gram(s) Oral daily  senna 2 Tablet(s) Oral at bedtime  simvastatin 20 milliGRAM(s) Oral at bedtime    MEDICATIONS  (PRN):  acetaminophen   Tablet .. 650 milliGRAM(s) Oral every 6 hours PRN Temp greater or equal to 38C (100.4F), Mild Pain (1 - 3)  benzocaine 15 mG/menthol 3.6 mG Lozenge 1 Lozenge Oral every 4 hours PRN Sore Throat      5'4''  pounds +-10%  Weight 154 pounds  BMI 29.2 %SYC=876    Weight Change: Based on most recent EMR wt     Estimated energy needs:   IBW used to calculate energy needs due to pt's current body weight exceeding 120% of IBW   Adjusted for age, Renal, COVID19; Fluids per team   1650-1925kcal/day 30-35kcal/kg  66-77gm/day 1.2-1.4gm/kg -- will increase PRN Pending BUN/BG Trending Down     Subjective: 85F with PMHx T2DM, HLD, HTN presented to [facility] ED on 10/9 with 1w Hx of generalized weakness, fatigue, SOB, decreased appetite, and cough in the setting of positive COVID PCR. Now with worsening hypoxemia i/s/o COVID PNA and new aspiration PNA. Pt transferred to MICU for worsening hypoxemia on HFNC 60/60 with PaO2 60 on ABG with worsening B/L infiltrates and CXR and new RML infiltrate. Likely 2/2 to new aspiration PNA after event on 10/12. At that time she became hypoxic to 88%, requiring O2 escalation to HFNC. She remained hypoxic, increased work of breathing despite proning/HFNC. Pt transferred to the ICU for acute respiratory failure 2/2 COVID, superimposed aspiration pneumonia. Pt remained stable in the MICU w/o pressure support or mechanical ventilation, stepped down to 7LA, where she was successfully weaned form HFNC 60/60 to 4l NC.     Spoke with Team/RN. Pt sleeping during time of RD visit. Pt currently ordered for DASH consCHO Renal lacto-ovo Kosher diet. Per RN pt very unhappy with food however does have good PO intake. Pt is Kosher. Per food/ntr staff limited options d/t restrictive nature of diets in combination with renal. K currently WDL (Higher end) and Phos 4.9, likely can d/c Renal to allow more options - should lytes spike Pending change, add no conc K/phos PRN. Noted to be following a mainly plant based diet, However would d/c laco-ovo to allow more options as well and have pt order plant based options as she wishes. Despite being unhappy with diet, no issues swallowing per RN/team.   Labs: sodium 130, Glucose 270, phosphorus 4.9, POCT 254 272 269, BUN/Cr 66/2.49   Please see nutr recs below. RD to follow.      Previous Nutrition Diagnosis: no prior DX   If resolved, new PES: Increased needs RT increased Demands AEB COVID19  Goal: Pt will meet at least 75% of nutrient needs     Recommendations:  1. CONSCHO, Kosher diet. PO cons per Team.   >> Add no Conc K/Phos PRN Pending Lytes.   2. Monitor %PO intake, diet tolerance.   >> Should pt be noted with s/s of issues swallowing, recommend NPO/SLP.  3. Labs: monitor BMP, CBC, glucose, lytes, trend renal indices, LFTs, POCT.  4. Monitor Skin, Wts, Pain, GI, GOC.  5. RD to remain available for additional nutrition interventions as needed.   *D/w Team.    Education: NA     Risk Level: High [  X ] Moderate [   ] Low [   ]
ROIDS-Dose Study Enrollment Note    IRB     Study Subject    10-11-21 @ 14:00    Dr. Keaton Burnham discussed with patient and legal authorized representative regarding PADMA MARKS eligibility for the Randomized Open Investigation Determining Steroid Dose (ROIDS-Dose) Trial. PADMA MARKS meets all inclusion (age > 18, COVID PCR positive, Oxygen Sat anthony of 80%) and none of the exclusion criteria for this research study.     Dr. Burnham explained the risks and benefits of enrolling in the study as well as the study design. Any and all questions asked by the family, LAR and patient were addressed and answered.      Patient/LAR signed the consent form and Dr. Burnham signed the consent form as the consenting physician.      A copy of the consent form was placed into the chart and a copy was given to the patient/family.      Pharmacy was contacted and study drug was ordered. The primary team was notified.    Eugenie Venegas  Study Investigator  Pulmonary / Critical Care    If you have any questions or concerns, please feel to contact [facility] PI Dr. Krystin Shah [phone].
Walk test performed this morning:  - On 2L/min supine, sat 93-96%  - On RA supine, sat 88%  - On RA ambulating, sat 86%  - On 2L/min NC ambulating, sat 91%.     Pt denied any shortness of breath or fatigue throughout the test.

## 2021-10-20 NOTE — PROGRESS NOTE ADULT - PROBLEM/PLAN-2
DISPLAY PLAN FREE TEXT
Hypertension, renal
DISPLAY PLAN FREE TEXT
DISPLAY PLAN FREE TEXT

## 2021-10-20 NOTE — PROGRESS NOTE ADULT - PROBLEM SELECTOR PROBLEM 1
Respiratory failure, unspecified with hypoxia
Acute respiratory failure with hypoxia
Acute respiratory failure with hypoxia
Respiratory failure, unspecified with hypoxia
Acute hypoxemic respiratory failure due to COVID-19
Acute hypoxemic respiratory failure due to COVID-19

## 2021-10-20 NOTE — PROGRESS NOTE ADULT - PROBLEM SELECTOR PLAN 3
A1C 7.1% this visit. Patient has been hyperglycemic with -300s,s/p decadron course which ended on 10/18. Home meds: glimepiride 2mg BID and metformin 1g daily. Also with waxing and waning PO intake as per patient.   - Lantus decrease from 24 to 18U   - Miss

## 2021-10-20 NOTE — PROGRESS NOTE ADULT - PROBLEM SELECTOR PLAN 1
Pt admitted w/ acute hypoxic respiratory failure 2/2 respiratory failure in the setting of covid. Weaned from HFNC to NC but became acutely hypoxic requiring HFNC in the setting of presumed aspiration pneumonia. s/p treatment for aspiration pna and now on 2L NC. CXR 10/18 w/ b/l infiltrates. Last BCx 10/13 no growth.   - Weaned O2 as tolerate, now on 2L NC  - RA Sat 88% while resting, while ambulating 86%, with 2L sat while resting 93% and ambulating 91%  - Home O2 submitted today by FAITH

## 2021-10-20 NOTE — PROGRESS NOTE ADULT - PROBLEM SELECTOR PLAN 6
RESOLVED  Patient is probably back to baseline with Cr 2.4 range, CKD stage 4?  - will get collateral from PCP for accurate Cr baseline     Presented w/ JUDITH (Cr 4.58 on admission) 2/2 ATN in the setting of poor PO intake. FeNa 1.2% (intrinsic) on 10/10. Cr now improved, 2.49 today. Renal US demonstrated evidence of chronic kidney disease.   - trend creatinine  - Monitor urine output  - encourage PO intake RESOLVED  Na today 138  Pt w/ persistent hyponatremia in the setting of Covid and decreased overall PO intake. Na 130 today. Pt is tolerating PO and has not received IV fluids recently. Pt appears euvolemic on exam.  - encourage PO intake

## 2021-10-20 NOTE — PROGRESS NOTE ADULT - PROBLEM SELECTOR PLAN 4
Pt w/ history of hypertension. systolic -150s last 24hrs. Home meds: amlodipine 10mg PO daily, metoprolol ER succinate 100mg PO daily and nifedipine 60mg ED daily  - c/w amlodipine 10mg qd  - c/w toprol 100mg qd  - c/w nifedipine 60mg qd

## 2021-10-20 NOTE — PROGRESS NOTE ADULT - PROBLEM SELECTOR PROBLEM 2
Sepsis due to COVID-19
Pneumonia due to COVID-19 virus
Sepsis due to COVID-19

## 2021-10-21 VITALS
OXYGEN SATURATION: 91 % | TEMPERATURE: 99 F | HEART RATE: 89 BPM | SYSTOLIC BLOOD PRESSURE: 143 MMHG | DIASTOLIC BLOOD PRESSURE: 75 MMHG | RESPIRATION RATE: 17 BRPM

## 2021-10-21 DIAGNOSIS — J69.0 PNEUMONITIS DUE TO INHALATION OF FOOD AND VOMIT: ICD-10-CM

## 2021-10-21 LAB
ANION GAP SERPL CALC-SCNC: 9 MMOL/L — SIGNIFICANT CHANGE UP (ref 5–17)
BASOPHILS # BLD AUTO: 0.03 K/UL — SIGNIFICANT CHANGE UP (ref 0–0.2)
BASOPHILS NFR BLD AUTO: 0.3 % — SIGNIFICANT CHANGE UP (ref 0–2)
BUN SERPL-MCNC: 57 MG/DL — HIGH (ref 7–23)
CALCIUM SERPL-MCNC: 9 MG/DL — SIGNIFICANT CHANGE UP (ref 8.4–10.5)
CHLORIDE SERPL-SCNC: 104 MMOL/L — SIGNIFICANT CHANGE UP (ref 96–108)
CO2 SERPL-SCNC: 23 MMOL/L — SIGNIFICANT CHANGE UP (ref 22–31)
CREAT SERPL-MCNC: 2.41 MG/DL — HIGH (ref 0.5–1.3)
EOSINOPHIL # BLD AUTO: 0.22 K/UL — SIGNIFICANT CHANGE UP (ref 0–0.5)
EOSINOPHIL NFR BLD AUTO: 2.2 % — SIGNIFICANT CHANGE UP (ref 0–6)
GLUCOSE BLDC GLUCOMTR-MCNC: 179 MG/DL — HIGH (ref 70–99)
GLUCOSE BLDC GLUCOMTR-MCNC: 181 MG/DL — HIGH (ref 70–99)
GLUCOSE SERPL-MCNC: 256 MG/DL — HIGH (ref 70–99)
HCT VFR BLD CALC: 41.2 % — SIGNIFICANT CHANGE UP (ref 34.5–45)
HGB BLD-MCNC: 13.1 G/DL — SIGNIFICANT CHANGE UP (ref 11.5–15.5)
IMM GRANULOCYTES NFR BLD AUTO: 1.6 % — HIGH (ref 0–1.5)
LYMPHOCYTES # BLD AUTO: 1.5 K/UL — SIGNIFICANT CHANGE UP (ref 1–3.3)
LYMPHOCYTES # BLD AUTO: 15 % — SIGNIFICANT CHANGE UP (ref 13–44)
MAGNESIUM SERPL-MCNC: 2 MG/DL — SIGNIFICANT CHANGE UP (ref 1.6–2.6)
MCHC RBC-ENTMCNC: 28.2 PG — SIGNIFICANT CHANGE UP (ref 27–34)
MCHC RBC-ENTMCNC: 31.8 GM/DL — LOW (ref 32–36)
MCV RBC AUTO: 88.6 FL — SIGNIFICANT CHANGE UP (ref 80–100)
MONOCYTES # BLD AUTO: 1.14 K/UL — HIGH (ref 0–0.9)
MONOCYTES NFR BLD AUTO: 11.4 % — SIGNIFICANT CHANGE UP (ref 2–14)
NEUTROPHILS # BLD AUTO: 6.96 K/UL — SIGNIFICANT CHANGE UP (ref 1.8–7.4)
NEUTROPHILS NFR BLD AUTO: 69.5 % — SIGNIFICANT CHANGE UP (ref 43–77)
NRBC # BLD: 0 /100 WBCS — SIGNIFICANT CHANGE UP (ref 0–0)
PHOSPHATE SERPL-MCNC: 3.6 MG/DL — SIGNIFICANT CHANGE UP (ref 2.5–4.5)
PLATELET # BLD AUTO: 308 K/UL — SIGNIFICANT CHANGE UP (ref 150–400)
POTASSIUM SERPL-MCNC: 4.6 MMOL/L — SIGNIFICANT CHANGE UP (ref 3.5–5.3)
POTASSIUM SERPL-SCNC: 4.6 MMOL/L — SIGNIFICANT CHANGE UP (ref 3.5–5.3)
RBC # BLD: 4.65 M/UL — SIGNIFICANT CHANGE UP (ref 3.8–5.2)
RBC # FLD: 14.3 % — SIGNIFICANT CHANGE UP (ref 10.3–14.5)
SODIUM SERPL-SCNC: 136 MMOL/L — SIGNIFICANT CHANGE UP (ref 135–145)
WBC # BLD: 10.01 K/UL — SIGNIFICANT CHANGE UP (ref 3.8–10.5)
WBC # FLD AUTO: 10.01 K/UL — SIGNIFICANT CHANGE UP (ref 3.8–10.5)

## 2021-10-21 PROCEDURE — 99239 HOSP IP/OBS DSCHRG MGMT >30: CPT

## 2021-10-21 RX ORDER — ACETAMINOPHEN 500 MG
2 TABLET ORAL
Qty: 40 | Refills: 0
Start: 2021-10-21 | End: 2021-10-25

## 2021-10-21 RX ADMIN — Medication 10 UNIT(S): at 08:45

## 2021-10-21 RX ADMIN — Medication 60 MILLIGRAM(S): at 11:16

## 2021-10-21 RX ADMIN — Medication 2: at 08:44

## 2021-10-21 RX ADMIN — Medication 2: at 12:38

## 2021-10-21 RX ADMIN — HEPARIN SODIUM 5000 UNIT(S): 5000 INJECTION INTRAVENOUS; SUBCUTANEOUS at 06:26

## 2021-10-21 RX ADMIN — Medication 200 MILLIGRAM(S): at 11:16

## 2021-10-21 RX ADMIN — AMLODIPINE BESYLATE 10 MILLIGRAM(S): 2.5 TABLET ORAL at 06:26

## 2021-10-21 RX ADMIN — Medication 10 UNIT(S): at 12:38

## 2021-10-21 RX ADMIN — BENZOCAINE AND MENTHOL 1 LOZENGE: 5; 1 LIQUID ORAL at 06:26

## 2021-10-21 RX ADMIN — Medication 200 MILLIGRAM(S): at 06:26

## 2021-10-21 NOTE — DISCHARGE NOTE PROVIDER - HOSPITAL COURSE
#Discharge: do not delete    Patient is 86 yo F with past medical history of T2DM, HLD, HTN, presented 10/9 with 1w Hx of generalized weakness, fatigue, SOB, decreased appetite, and cough, found to have COVID, course complicated by aspiration event requiring step up to MICU on HFNC, since then has been stepped down to RMF now stable for discharge.    Inpatient treatment course: Decadron (10/10-10/19), vancomycin (10/13-10/14), zosyn (10/13-10/15)    Problem List/Main Diagnoses:     New medications/therapies: Home O2  New lines/hardware: None  Labs to be followed outpatient: None  Exam to be followed outpatient: None    Discharge plan: discharge to home     #Discharge: do not delete    Patient is 86 yo F with past medical history of T2DM, HLD, HTN, presented 10/9 with 1w Hx of generalized weakness, fatigue, SOB, decreased appetite, and cough, found to have COVID, course complicated by aspiration event requiring step up to MICU on HFNC, since then has been stepped down to RMF now stable for discharge.    Inpatient treatment course: Decadron (10/10-10/19), vancomycin (10/13-10/14), zosyn (10/13-10/15).    Problem List/Main Diagnoses:   #Acute hypoxic resp failure 2/2 COVID PNA  Weaned from HFNC to NC but became acutely hypoxic 2/2 aspiration event, stepped up to MICU and treatment for aspiration PNA initiated. Vanc/Zosyn dc'ed due to low supsicion for bacterial PNA. Stepped down to RMF on 10/20, now on 2L/min NC saturating 95%. S/p 10 day course of Dexamethasone, completed on 10/18. Last COVID test on 10/18 was positive. Remdesivir was held due to impaired renal function  - Walk test: 88% on RA at rest, 86% on RA ambulating  - Will be discharged on home O2 and pulse oximetry  - Tylenol, guaifenesin PRN  - F/u w/ PCP    #Diabetes  A1C 7.1% this visit. Pt hyperglycemic w/ -300s. Now is s/p Decadron course.  - C/w home glimepiride 2mg BID and metformin 1g daily    #HTN  - C/w home amlodipine 10mg qd, toprol 100mg qd, nifedipine 60mg qd.    #HLD  ASCVD risk 11.2%. On simvastatin 20mg. This visit, cholesterol 213, triglycerides 247, HDL 49.  -Outpatient recommendation to increase statin.    #JUDITH on CKD  Cr 4.63 on admission, now stable at Cr 2.4  - F/u w/ PCP    New medications/therapies: Home O2, pulse oximeter, tylenol, guaifenesin  New lines/hardware: None  Labs to be followed outpatient: None  Exam to be followed outpatient: None    Discharge plan: discharge to home, f/u w/ PCP

## 2021-10-21 NOTE — DISCHARGE NOTE PROVIDER - NSDCCPCAREPLAN_GEN_ALL_CORE_FT
PRINCIPAL DISCHARGE DIAGNOSIS  Diagnosis: Acute hypoxemic respiratory failure due to COVID-19  Assessment and Plan of Treatment: You came to the hospital with signs and symptoms concerning for COVID-19. You were swabbed for the infection and it returned positive. You were treated with Decadron for COVID 19. You have been clinically stable and deemed safe for discharge home to continue your course of infection with supplemental oxygen.  Your quarantine time frame was until 10 days after your first onset of symptoms, which has already passed. If you wish to extend this quarantine period, you should stay in your own room whenever possible and wear a mask as much as possible. If you have to be in the same room as someone else, you should both wear a mask. If you share a restroom, you should wipe it down with bleach wipes between each use.  Please continue to practice social distancing and good hand hygiene.  Please follow the remainder of the instructions provided to you at discharge.      SECONDARY DISCHARGE DIAGNOSES  Diagnosis: Acute hypoxemic respiratory failure due to COVID-19  Assessment and Plan of Treatment:     Diagnosis: JUDITH (acute kidney injury)  Assessment and Plan of Treatment:      PRINCIPAL DISCHARGE DIAGNOSIS  Diagnosis: Acute hypoxemic respiratory failure due to COVID-19  Assessment and Plan of Treatment: You came to the hospital with signs and symptoms concerning for COVID-19. You were swabbed for the infection and it returned positive. You were treated with Decadron for COVID 19. You have been clinically stable and deemed safe for discharge home to continue your course of infection with supplemental oxygen.  Your quarantine time frame was until 10 days after your first onset of symptoms, which has already passed. If you wish to extend this quarantine period, you should stay in your own room whenever possible and wear a mask as much as possible. If you have to be in the same room as someone else, you should both wear a mask. If you share a restroom, you should wipe it down with bleach wipes between each use.  Please continue to practice social distancing and good hand hygiene.  Please follow the remainder of the instructions provided to you at discharge.

## 2021-10-21 NOTE — DISCHARGE NOTE PROVIDER - NSDCFUADDAPPT_GEN_ALL_CORE_FT
Please schedule a follow up appointment with your primary care provider within 2 weeks of discharge to monitor your improvement from COVID infection. Please schedule a follow up appointment with your primary care provider within 2 weeks of discharge to monitor your improvement from COVID infection. We were unable to get in contact with Dr. Ordonez's office.

## 2021-10-21 NOTE — DISCHARGE NOTE NURSING/CASE MANAGEMENT/SOCIAL WORK - FLU SEASON?
Quality 110: Preventive Care And Screening: Influenza Immunization: Influenza Immunization Administered during Influenza season Detail Level: Detailed Quality 131: Pain Assessment And Follow-Up: Pain assessment NOT documented as being performed, documentation the patient is not eligible for a pain assessment using a standardized tool Yes...

## 2021-10-21 NOTE — PROGRESS NOTE ADULT - REASON FOR ADMISSION
COVID PNA

## 2021-10-21 NOTE — DISCHARGE NOTE NURSING/CASE MANAGEMENT/SOCIAL WORK - NSDCFUADDAPPT_GEN_ALL_CORE_FT
Please schedule a follow up appointment with your primary care provider within 2 weeks of discharge to monitor your improvement from COVID infection.

## 2021-10-21 NOTE — DISCHARGE NOTE PROVIDER - NSDCCPTREATMENT_GEN_ALL_CORE_FT
PRINCIPAL PROCEDURE  Procedure: TTE (transthoracic echocardiography)  Findings and Treatment:   < end of copied text >  CONCLUSIONS:   1. Limited study obtained for evaluation of left ventricular function.   2. Mild symmetric left ventricular hypertrophy.   3. Normal left ventricular systolic function.   4. Normal right ventricular size and systolic function.   5. No evidence of pulmonary hypertension.   6. No pericardial effusion.< from: TTE Limited Echo w/o Cont (10.14.21 @ 11:14) >

## 2021-10-21 NOTE — DISCHARGE NOTE PROVIDER - NSDCMRMEDTOKEN_GEN_ALL_CORE_FT
amLODIPine 10 mg oral tablet: 1 tab(s) orally once a day  glimepiride 2 mg oral tablet: 2 milligram(s) orally 2 times a day  metFORMIN 1000 mg oral tablet: 1000 milligram(s) orally once a day  Metoprolol Succinate  mg oral tablet, extended release: 1 tab(s) orally once a day  NIFEdipine 60 mg oral tablet, extended release: 1 tab(s) orally once a day  simvastatin 20 mg oral tablet: 1 tab(s) orally once a day (at bedtime)   amLODIPine 10 mg oral tablet: 1 tab(s) orally once a day  glimepiride 2 mg oral tablet: 2 milligram(s) orally 2 times a day  metFORMIN 1000 mg oral tablet: 1000 milligram(s) orally once a day  Metoprolol Succinate  mg oral tablet, extended release: 1 tab(s) orally once a day  NIFEdipine 60 mg oral tablet, extended release: 1 tab(s) orally once a day  pulse oximeter:   simvastatin 20 mg oral tablet: 1 tab(s) orally once a day (at bedtime)   amLODIPine 10 mg oral tablet: 1 tab(s) orally once a day  glimepiride 2 mg oral tablet: 2 milligram(s) orally 2 times a day  guaiFENesin 100 mg/5 mL oral liquid: 10 milliliter(s) orally every 6 hours, As Needed for cough  metFORMIN 1000 mg oral tablet: 1000 milligram(s) orally once a day  Metoprolol Succinate  mg oral tablet, extended release: 1 tab(s) orally once a day  NIFEdipine 60 mg oral tablet, extended release: 1 tab(s) orally once a day  pulse oximeter:   simvastatin 20 mg oral tablet: 1 tab(s) orally once a day (at bedtime)  Tylenol 325 mg oral tablet: 2 tab(s) orally every 6 hours, As needed, Temp greater or equal to 38C (100.4F), Mild Pain (1 - 3)

## 2021-10-21 NOTE — PROGRESS NOTE ADULT - PROVIDER SPECIALTY LIST ADULT
Internal Medicine
MICU
MICU
Internal Medicine
MICU
Internal Medicine
Internal Medicine

## 2021-10-21 NOTE — DISCHARGE NOTE PROVIDER - PROVIDER TOKENS
FREE:[LAST:[Alexandr],FIRST:[Ala],PHONE:[(   )    -],FAX:[(   )    -],ESTABLISHEDPATIENT:[T]] FREE:[LAST:[Mery],FIRST:[Risa],PHONE:[phone]],FAX:[(   )    -],ADDRESS:[[address] [place] [address]],FOLLOWUP:[2 weeks],ESTABLISHEDPATIENT:[T]]

## 2021-10-21 NOTE — DISCHARGE NOTE NURSING/CASE MANAGEMENT/SOCIAL WORK - PATIENT PORTAL LINK FT
You can access the FollowMyHealth Patient Portal offered by Health system by registering at the following website: [email or web address]. By joining [employer]’s FollowMyHealth portal, you will also be able to view your health information using other applications (apps) compatible with our system.

## 2021-11-04 DIAGNOSIS — Z79.899 OTHER LONG TERM (CURRENT) DRUG THERAPY: ICD-10-CM

## 2021-11-04 DIAGNOSIS — J96.01 ACUTE RESPIRATORY FAILURE WITH HYPOXIA: ICD-10-CM

## 2021-11-04 DIAGNOSIS — R33.9 RETENTION OF URINE, UNSPECIFIED: ICD-10-CM

## 2021-11-04 DIAGNOSIS — I12.9 HYPERTENSIVE CHRONIC KIDNEY DISEASE WITH STAGE 1 THROUGH STAGE 4 CHRONIC KIDNEY DISEASE, OR UNSPECIFIED CHRONIC KIDNEY DISEASE: ICD-10-CM

## 2021-11-04 DIAGNOSIS — N18.9 CHRONIC KIDNEY DISEASE, UNSPECIFIED: ICD-10-CM

## 2021-11-04 DIAGNOSIS — U07.1 COVID-19: ICD-10-CM
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
## 2021-11-04 DIAGNOSIS — E86.0 DEHYDRATION: ICD-10-CM

## 2021-11-04 DIAGNOSIS — J12.82 PNEUMONIA DUE TO CORONAVIRUS DISEASE 2019: ICD-10-CM

## 2021-11-04 DIAGNOSIS — E87.1 HYPO-OSMOLALITY AND HYPONATREMIA: ICD-10-CM

## 2021-11-04 DIAGNOSIS — N17.0 ACUTE KIDNEY FAILURE WITH TUBULAR NECROSIS: ICD-10-CM

## 2021-11-04 DIAGNOSIS — A41.89 OTHER SPECIFIED SEPSIS: ICD-10-CM

## 2021-11-04 DIAGNOSIS — E11.65 TYPE 2 DIABETES MELLITUS WITH HYPERGLYCEMIA: ICD-10-CM

## 2021-11-04 DIAGNOSIS — Z79.84 LONG TERM (CURRENT) USE OF ORAL HYPOGLYCEMIC DRUGS: ICD-10-CM

## 2021-11-04 DIAGNOSIS — E78.5 HYPERLIPIDEMIA, UNSPECIFIED: ICD-10-CM

## 2021-11-04 DIAGNOSIS — T38.0X5A ADVERSE EFFECT OF GLUCOCORTICOIDS AND SYNTHETIC ANALOGUES, INITIAL ENCOUNTER: ICD-10-CM

## 2021-11-04 DIAGNOSIS — J69.0 PNEUMONITIS DUE TO INHALATION OF FOOD AND VOMIT: ICD-10-CM

## 2021-12-22 PROCEDURE — 83880 ASSAY OF NATRIURETIC PEPTIDE: CPT

## 2021-12-22 PROCEDURE — 85027 COMPLETE CBC AUTOMATED: CPT

## 2021-12-22 PROCEDURE — 36415 COLL VENOUS BLD VENIPUNCTURE: CPT

## 2021-12-22 PROCEDURE — 83036 HEMOGLOBIN GLYCOSYLATED A1C: CPT

## 2021-12-22 PROCEDURE — 84295 ASSAY OF SERUM SODIUM: CPT

## 2021-12-22 PROCEDURE — 0225U NFCT DS DNA&RNA 21 SARSCOV2: CPT

## 2021-12-22 PROCEDURE — 99291 CRITICAL CARE FIRST HOUR: CPT

## 2021-12-22 PROCEDURE — 97162 PT EVAL MOD COMPLEX 30 MIN: CPT

## 2021-12-22 PROCEDURE — 82728 ASSAY OF FERRITIN: CPT

## 2021-12-22 PROCEDURE — 83935 ASSAY OF URINE OSMOLALITY: CPT

## 2021-12-22 PROCEDURE — 93005 ELECTROCARDIOGRAM TRACING: CPT

## 2021-12-22 PROCEDURE — 82570 ASSAY OF URINE CREATININE: CPT

## 2021-12-22 PROCEDURE — 84300 ASSAY OF URINE SODIUM: CPT

## 2021-12-22 PROCEDURE — 82330 ASSAY OF CALCIUM: CPT

## 2021-12-22 PROCEDURE — 83605 ASSAY OF LACTIC ACID: CPT

## 2021-12-22 PROCEDURE — 80053 COMPREHEN METABOLIC PANEL: CPT

## 2021-12-22 PROCEDURE — 84132 ASSAY OF SERUM POTASSIUM: CPT

## 2021-12-22 PROCEDURE — 94640 AIRWAY INHALATION TREATMENT: CPT

## 2021-12-22 PROCEDURE — 86769 SARS-COV-2 COVID-19 ANTIBODY: CPT

## 2021-12-22 PROCEDURE — 85652 RBC SED RATE AUTOMATED: CPT

## 2021-12-22 PROCEDURE — 81001 URINALYSIS AUTO W/SCOPE: CPT

## 2021-12-22 PROCEDURE — 71045 X-RAY EXAM CHEST 1 VIEW: CPT

## 2021-12-22 PROCEDURE — U0005: CPT

## 2021-12-22 PROCEDURE — 85379 FIBRIN DEGRADATION QUANT: CPT

## 2021-12-22 PROCEDURE — 97116 GAIT TRAINING THERAPY: CPT

## 2021-12-22 PROCEDURE — 87186 SC STD MICRODIL/AGAR DIL: CPT

## 2021-12-22 PROCEDURE — 80202 ASSAY OF VANCOMYCIN: CPT

## 2021-12-22 PROCEDURE — 80048 BASIC METABOLIC PNL TOTAL CA: CPT

## 2021-12-22 PROCEDURE — 82803 BLOOD GASES ANY COMBINATION: CPT

## 2021-12-22 PROCEDURE — 80061 LIPID PANEL: CPT

## 2021-12-22 PROCEDURE — 93321 DOPPLER ECHO F-UP/LMTD STD: CPT

## 2021-12-22 PROCEDURE — 82962 GLUCOSE BLOOD TEST: CPT

## 2021-12-22 PROCEDURE — 76775 US EXAM ABDO BACK WALL LIM: CPT

## 2021-12-22 PROCEDURE — 87040 BLOOD CULTURE FOR BACTERIA: CPT

## 2021-12-22 PROCEDURE — 84484 ASSAY OF TROPONIN QUANT: CPT

## 2021-12-22 PROCEDURE — 87150 DNA/RNA AMPLIFIED PROBE: CPT

## 2021-12-22 PROCEDURE — 85025 COMPLETE CBC W/AUTO DIFF WBC: CPT

## 2021-12-22 PROCEDURE — 86140 C-REACTIVE PROTEIN: CPT

## 2021-12-22 PROCEDURE — U0003: CPT

## 2021-12-22 PROCEDURE — 83735 ASSAY OF MAGNESIUM: CPT

## 2021-12-22 PROCEDURE — 84540 ASSAY OF URINE/UREA-N: CPT

## 2021-12-22 PROCEDURE — 85610 PROTHROMBIN TIME: CPT

## 2021-12-22 PROCEDURE — 84145 PROCALCITONIN (PCT): CPT

## 2021-12-22 PROCEDURE — 96374 THER/PROPH/DIAG INJ IV PUSH: CPT

## 2021-12-22 PROCEDURE — 85730 THROMBOPLASTIN TIME PARTIAL: CPT

## 2021-12-22 PROCEDURE — 84100 ASSAY OF PHOSPHORUS: CPT

## 2024-05-23 ENCOUNTER — EMERGENCY (EMERGENCY)
Facility: HOSPITAL | Age: 89
LOS: 1 days | Discharge: HOME | End: 2024-05-23
Attending: EMERGENCY MEDICINE | Admitting: EMERGENCY MEDICINE
Payer: MEDICARE

## 2024-05-23 VITALS
OXYGEN SATURATION: 98 % | DIASTOLIC BLOOD PRESSURE: 82 MMHG | WEIGHT: 154.98 LBS | SYSTOLIC BLOOD PRESSURE: 137 MMHG | TEMPERATURE: 98 F | RESPIRATION RATE: 16 BRPM | HEART RATE: 97 BPM

## 2024-05-23 VITALS
HEART RATE: 74 BPM | DIASTOLIC BLOOD PRESSURE: 80 MMHG | OXYGEN SATURATION: 96 % | SYSTOLIC BLOOD PRESSURE: 130 MMHG | TEMPERATURE: 98 F | RESPIRATION RATE: 18 BRPM

## 2024-05-23 DIAGNOSIS — N18.9 CHRONIC KIDNEY DISEASE, UNSPECIFIED: ICD-10-CM

## 2024-05-23 DIAGNOSIS — E11.22 TYPE 2 DIABETES MELLITUS WITH DIABETIC CHRONIC KIDNEY DISEASE: ICD-10-CM

## 2024-05-23 DIAGNOSIS — L98.9 DISORDER OF THE SKIN AND SUBCUTANEOUS TISSUE, UNSPECIFIED: ICD-10-CM

## 2024-05-23 DIAGNOSIS — E78.5 HYPERLIPIDEMIA, UNSPECIFIED: ICD-10-CM

## 2024-05-23 DIAGNOSIS — I12.9 HYPERTENSIVE CHRONIC KIDNEY DISEASE WITH STAGE 1 THROUGH STAGE 4 CHRONIC KIDNEY DISEASE, OR UNSPECIFIED CHRONIC KIDNEY DISEASE: ICD-10-CM

## 2024-05-23 PROBLEM — I10 ESSENTIAL (PRIMARY) HYPERTENSION: Chronic | Status: ACTIVE | Noted: 2021-10-09

## 2024-05-23 PROBLEM — E11.9 TYPE 2 DIABETES MELLITUS WITHOUT COMPLICATIONS: Chronic | Status: ACTIVE | Noted: 2021-10-09

## 2024-05-23 PROCEDURE — 99282 EMERGENCY DEPT VISIT SF MDM: CPT

## 2024-05-23 PROCEDURE — 99283 EMERGENCY DEPT VISIT LOW MDM: CPT

## 2024-05-23 RX ORDER — HYDROCORTISONE 1 %
1 OINTMENT (GRAM) TOPICAL
Qty: 1 | Refills: 0
Start: 2024-05-23 | End: 2024-05-29

## 2024-05-23 NOTE — ED ADULT NURSE NOTE - NSFALLUNIVINTERV_ED_ALL_ED
Bed/Stretcher in lowest position, wheels locked, appropriate side rails in place/Call bell, personal items and telephone in reach/Instruct patient to call for assistance before getting out of bed/chair/stretcher/Non-slip footwear applied when patient is off stretcher/[place] to call system/Physically safe environment - no spills, clutter or unnecessary equipment/Purposeful proactive rounding/Room/bathroom lighting operational, light cord in reach

## 2024-05-23 NOTE — ED PROVIDER NOTE - NSICDXPASTMEDICALHX_GEN_ALL_CORE_FT
PAST MEDICAL HISTORY:  CKD (chronic kidney disease)     DM (diabetes mellitus)     HLD (hyperlipidemia)     HTN (hypertension)

## 2024-05-23 NOTE — ED PROVIDER NOTE - ATTENDING APP SHARED VISIT CONTRIBUTION OF CARE
88 F with atraumatic L lateral leg lesion, mild painful and redness s/p improvement after course of abx, present for 6 weeks and improving.  No fever or discharge.  Pt with hx of CKD and DM2.  VSS, pt afebrile, looks very well, nad, flesh colored growth with stalk to L lateral leg appears flaky and dry, keratinized and horn like.  Most likely actinic keratosis, doubt abscess.  PA attempted needle aspiration no pus returned.  Will hold on addn abx and pt has fu with derm within week.  Return precautions given.

## 2024-05-23 NOTE — ED PROVIDER NOTE - CLINICAL SUMMARY MEDICAL DECISION MAKING FREE TEXT BOX
89 y/o female w/ hx htn, hld, dm, ckd p/w lesion to L shin x approx 6 wks.   was seen at  on 4/23/24, given po abx for poss overlying cellulitis.   then followed up with her PMD and was given another course abx.  Pt made appt with dermatologist for 4/28/24, but wanted to get checked before then to make sure nothing emergent needed to be done.   initially had redness around area, which has resolved.  Reports mild ttp over actual lesion, but no pain unless touching it.  Denies f/c, rash elsewhere, trauma to area, numbness/tingling/weakness to ext.  VS notable for HR 97, otherwise wnl  Exam with +approx 1 cm circular indurated pedunculated lesion present to L mid anterior shin with minimal erythema/ ttp.  No fluctuance.  No surrounding erythema/ ttp/ swelling.  No streaking.   Low suspicion for acute cellulitis/ abscess at this time.  Suspect poss actinic keratosis vs potential malignancy.  D/w pt importance of derm f/u and biopsy. Pt has appt, feels comfortable with keeping that appt.  D/w pt risks/benefits of additional abx.  Pt would like to hold on additional abx, which I feel is appropriate.  Given strict return precautions.  Will DC with derm f/u.  Can trial topical hydrocortisone for discomfort 87 y/o female w/ hx htn, hld, dm, ckd p/w lesion to L shin x approx 6 wks.   was seen at  on 4/23/24, given po abx for poss overlying cellulitis.   then followed up with her PMD and was given another course abx.  Pt made appt with dermatologist for 4/28/24, but wanted to get checked before then to make sure nothing emergent needed to be done.   initially had redness around area, which has resolved.  Reports mild ttp over actual lesion, but no pain unless touching it.  Denies f/c, rash elsewhere, trauma to area, numbness/tingling/weakness to ext.  VS notable for HR 97, otherwise wnl  Exam with +approx 1 cm circular indurated pedunculated lesion present to L mid anterior shin with minimal erythema/ ttp.  No fluctuance.  No surrounding erythema/ ttp/ swelling.  No streaking.   Low suspicion for acute cellulitis/ abscess at this time.  Attempted needle aspiration to check for presence of pus without any pus aspirated.  Just small amt blood expressed.  Suspect poss actinic keratosis vs potential malignancy.  D/w pt importance of derm f/u and biopsy. Pt has appt, feels comfortable with keeping that appt.  D/w pt risks/benefits of additional abx.  Pt would like to hold on additional abx, which I feel is appropriate.  Given strict return precautions.  Will DC with derm f/u.  Can trial topical hydrocortisone for discomfort

## 2024-05-23 NOTE — ED PROVIDER NOTE - PATIENT PORTAL LINK FT
You can access the FollowMyHealth Patient Portal offered by [facility] by registering at the following website: [email or web address]. By joining [employer]’s FollowMyHealth portal, you will also be able to view your health information using other applications (apps) compatible with our system.

## 2024-05-23 NOTE — ED PROVIDER NOTE - PHYSICAL EXAMINATION
CONSTITUTIONAL: Awake, alert.  Nontoxic, no acute distress.    HEAD: Normocephalic, atraumatic.    EYES: Conjunctivae clear without exudates or hemorrhage. Sclera is non-icteric.    ENT: Normal appearing external ears, nose, mucous membranes moist.    NECK: supple, trachea midline.    HEART:  Normal rate    LUNGS:  No acute respiratory distress.  Non-tachypneic and non-labored.     MUSCULOSKELETAL:  +approx 1 cm circular indurated pedunculated lesion present to L mid anterior shin with minimal erythema/ ttp.  No fluctuance.  No surrounding erythema/ ttp/ swelling.  No streaking.  Otherwise normal appearing extremities without obvious deformity, rash, ecchymosis, erythema.  No swelling.  Warm. No focal tenderness.  FROM b/l lower extremities.  5/5 strength b/l lower ext.  Sensation and motor function grossly intact.  Strong equal peripheral pulses b/l.   Cap refill < 2 b/l upper and lower ext.  All compartments soft.    NEUROLOGICAL:  Patient is alert, oriented x person, place and time.    PSYCH: Appropriate mood and affect. Good judgment and insight.

## 2024-05-23 NOTE — ED PROVIDER NOTE - OBJECTIVE STATEMENT
87 y/o female w/ hx htn, hld, dm, ckd p/w lesion to L shin x approx 6 wks.   was seen at  on 4/23/24, given po abx for poss overlying cellulitis.   then followed up with her PMD and was given another course abx.  Pt made appt with dermatologist for 4/28/24, but wanted to get checked before then to make sure nothing emergent needed to be done.   initially had redness around area, which has resolved.  Reports mild ttp over actual lesion, but no pain unless touching it.  Denies f/c, rash elsewhere, trauma to area, numbness/tingling/weakness to ext.

## 2024-05-23 NOTE — ED ADULT TRIAGE NOTE - CHIEF COMPLAINT QUOTE
Patient PMH DM to ED c/o wound/mass to left leg x 2 months. Has been treated 2X on ABX r/t cellulitis but states no improvement in symptoms. Has not f/u with derm or had biopsy done. Ambulatory with steady gait, AAOx4, NAD.

## 2024-05-23 NOTE — ED PROVIDER NOTE - NSFOLLOWUPINSTRUCTIONS_ED_ALL_ED_FT
Thank you for visiting [facility] Emergency Department.      We saw you today for a lesion on your leg.  You may try topical hydrocortisone cream as needed for discomfort.    PAIN CONTROL:   You may take ibuprofen (Motrin, Advil) 600 mg (3 regular tablets) every 6 hours as needed for pain.  Please take with food.  Stop taking if you develop abdominal pain, dark/ bloody stools.  Do not mix with other NSAIDS (ie. Naproxen, Aleve, Celecoxib).  You may also take acetaminophen (Tylenol) 650-975mg (2-3 regular tablets) or 500-1000mg (1-2 extra strength tablets) every 6 hours as needed for pain.  Do not exceed 4000 mg in 1 day. These medications may be bought over the counter.    I recommend alternating the Ibuprofen and Tylenol so you are getting medications around the clock.  For example take the Ibuprofen, then 3 hours later take the Tylenol, then 3 hours later take the Ibuprofen, and repeat as needed.    Please follow up with dermatologist as planned.  You would likely benefit from a biopsy.    Please know that no emergency visit is complete without follow-up with your primary care provider in 1 week.  Please bring copies of all discharge papers and results and show to your doctor.      Please continue taking all previous medications as instructed unless we discussed otherwise.     I appreciated your patience and hope you feel better soon.     Return to ER immediately if you develop fevers, chills, chest pain, shortness of breath, worsening pain, spreading redness, pus from lesion, increasing pain, and/or any concerning symptoms.

## 2024-05-23 NOTE — ED ADULT NURSE NOTE - OBJECTIVE STATEMENT
87 yo female c/o nodule on L lower leg. Pt reports she first noticed the bump last month and it has since gotten larger, hurts when touched, and has a crusty center. Nodule is raised, reddened, 4 cm diameter, with crusty center, located on L shin. Pt reports she was seen at  and prescribed abx to treat cellulitis but the bump then got larger. Denies fever, chills, fatigue, or any other sx. Hx DM, kidney disease. AAOx4, NAD, ambulatory with steady gait.